# Patient Record
Sex: MALE | Employment: OTHER | URBAN - METROPOLITAN AREA
[De-identification: names, ages, dates, MRNs, and addresses within clinical notes are randomized per-mention and may not be internally consistent; named-entity substitution may affect disease eponyms.]

---

## 2017-07-18 ENCOUNTER — HOSPITAL ENCOUNTER (OUTPATIENT)
Dept: GENERAL RADIOLOGY | Age: 77
Discharge: HOME | End: 2017-07-18
Attending: INTERNAL MEDICINE | Admitting: INTERNAL MEDICINE

## 2017-07-18 ENCOUNTER — OFFICE VISIT (OUTPATIENT)
Age: 77
End: 2017-07-18

## 2017-07-18 VITALS
HEIGHT: 63 IN | WEIGHT: 129.6 LBS | DIASTOLIC BLOOD PRESSURE: 68 MMHG | SYSTOLIC BLOOD PRESSURE: 132 MMHG | BODY MASS INDEX: 22.96 KG/M2

## 2017-07-18 DIAGNOSIS — M81.0 OSTEOPOROSIS: ICD-10-CM

## 2017-07-18 DIAGNOSIS — Z51.81 MEDICATION MONITORING ENCOUNTER: ICD-10-CM

## 2017-07-18 DIAGNOSIS — M81.0 OSTEOPOROSIS: Primary | ICD-10-CM

## 2017-07-18 PROCEDURE — 1123F ACP DISCUSS/DSCN MKR DOCD: CPT | Performed by: INTERNAL MEDICINE

## 2017-07-18 PROCEDURE — 1036F TOBACCO NON-USER: CPT | Performed by: INTERNAL MEDICINE

## 2017-07-18 PROCEDURE — 3017F COLORECTAL CA SCREEN DOC REV: CPT | Performed by: INTERNAL MEDICINE

## 2017-07-18 PROCEDURE — 99214 OFFICE O/P EST MOD 30 MIN: CPT | Performed by: INTERNAL MEDICINE

## 2017-07-18 PROCEDURE — G8427 DOCREV CUR MEDS BY ELIG CLIN: HCPCS | Performed by: INTERNAL MEDICINE

## 2017-07-18 PROCEDURE — G8420 CALC BMI NORM PARAMETERS: HCPCS | Performed by: INTERNAL MEDICINE

## 2017-07-18 PROCEDURE — 77080 DXA BONE DENSITY AXIAL: CPT | Performed by: INTERNAL MEDICINE

## 2017-07-18 PROCEDURE — 4040F PNEUMOC VAC/ADMIN/RCVD: CPT | Performed by: INTERNAL MEDICINE

## 2017-07-18 PROCEDURE — 4005F PHARM THX FOR OP RXD: CPT | Performed by: INTERNAL MEDICINE

## 2017-07-21 ENCOUNTER — PROCEDURE VISIT (OUTPATIENT)
Age: 77
End: 2017-07-21

## 2017-07-21 DIAGNOSIS — M81.0 OSTEOPOROSIS: Primary | ICD-10-CM

## 2018-07-22 PROBLEM — M81.0 AGE-RELATED OSTEOPOROSIS WITHOUT CURRENT PATHOLOGICAL FRACTURE: Status: ACTIVE | Noted: 2018-07-22

## 2018-08-14 ENCOUNTER — HOSPITAL ENCOUNTER (OUTPATIENT)
Dept: GENERAL RADIOLOGY | Age: 78
Discharge: HOME | End: 2018-08-14
Payer: MEDICARE

## 2018-08-14 ENCOUNTER — PROCEDURE VISIT (OUTPATIENT)
Age: 78
End: 2018-08-14

## 2018-08-14 ENCOUNTER — OFFICE VISIT (OUTPATIENT)
Age: 78
End: 2018-08-14

## 2018-08-14 VITALS
SYSTOLIC BLOOD PRESSURE: 133 MMHG | DIASTOLIC BLOOD PRESSURE: 68 MMHG | WEIGHT: 124 LBS | BODY MASS INDEX: 21.97 KG/M2 | HEIGHT: 63 IN

## 2018-08-14 DIAGNOSIS — M81.0 AGE-RELATED OSTEOPOROSIS WITHOUT CURRENT PATHOLOGICAL FRACTURE: ICD-10-CM

## 2018-08-14 DIAGNOSIS — M81.0 AGE-RELATED OSTEOPOROSIS WITHOUT CURRENT PATHOLOGICAL FRACTURE: Primary | ICD-10-CM

## 2018-08-14 DIAGNOSIS — Z51.81 MEDICATION MONITORING ENCOUNTER: ICD-10-CM

## 2018-08-14 PROCEDURE — 99214 OFFICE O/P EST MOD 30 MIN: CPT | Performed by: INTERNAL MEDICINE

## 2018-08-14 PROCEDURE — G8427 DOCREV CUR MEDS BY ELIG CLIN: HCPCS | Performed by: INTERNAL MEDICINE

## 2018-08-14 PROCEDURE — G8420 CALC BMI NORM PARAMETERS: HCPCS | Performed by: INTERNAL MEDICINE

## 2018-08-14 PROCEDURE — 1036F TOBACCO NON-USER: CPT | Performed by: INTERNAL MEDICINE

## 2018-08-14 PROCEDURE — 1101F PT FALLS ASSESS-DOCD LE1/YR: CPT | Performed by: INTERNAL MEDICINE

## 2018-08-14 PROCEDURE — 77080 DXA BONE DENSITY AXIAL: CPT | Performed by: INTERNAL MEDICINE

## 2018-08-14 PROCEDURE — 4040F PNEUMOC VAC/ADMIN/RCVD: CPT | Performed by: INTERNAL MEDICINE

## 2018-08-14 PROCEDURE — 77080 DXA BONE DENSITY AXIAL: CPT

## 2018-08-14 PROCEDURE — 1123F ACP DISCUSS/DSCN MKR DOCD: CPT | Performed by: INTERNAL MEDICINE

## 2018-08-14 RX ORDER — ALENDRONATE SODIUM 70 MG/1
TABLET ORAL
Qty: 13 TABLET | Refills: 4 | Status: SHIPPED | OUTPATIENT
Start: 2018-08-14 | End: 2019-11-01 | Stop reason: ALTCHOICE

## 2018-08-14 RX ORDER — ALENDRONATE SODIUM 70 MG/1
TABLET ORAL
Qty: 12 TABLET | Refills: 4 | Status: SHIPPED | OUTPATIENT
Start: 2018-08-14 | End: 2018-08-14 | Stop reason: SDUPTHER

## 2018-08-14 NOTE — PROGRESS NOTES
PAST MEDICAL AND SURGICAL HISTORY, SOCIAL HISTORY, AND REVIEW OF SYSTEMS, SEE PATIENT QUESTIONNAIRE OF TODAYS DATE. PHYSICAL EXAMINATION. GENERAL. Well-nourished, well-developed, normally proportioned adult. MENTAL STATUS. Pleasant mood. Oriented to time, place, and person. MUSCULOSKELETAL. Spinal contours are normal.  No spine tenderness to palpation or percussion. Two finger spaces between ribs and pelvis. Gait steady without assistance. NEUROLOGICAL. Able to rise from chair without using arms. No apparent focal motor or sensory deficit. Coordination appears normal. Using a walker    BONE DENSITY. Most recent done here using [employer] equipment. T-scores  Initial study: 06/11/2002 L1-L4 -0.4 left total hip -1.2   Current study: 08/14/2018 L1-L -1.0 left fem. neck -2.3     The table below shows bone mineral density (grams/cm2), the appropriate measure for comparing serial scans An increase or decrease is significant based on precision studies done at our center according to the ISCD protocol. PA spine Proximal Femur (left)   Date L1-L3 Fem. neck Trochanter Total hip   06/11/2002 --- NA NA 0.852   04/26/2004 --- NA NA 0.877   01/16/2007 --- NA NA 0.878   02/12/2009 --- NA NA 0.854   02/03/2011 --- 0.666 0.575 0.817   04/08/2013 0.957 0.568 0.575 0.783   06/03/2014 0.959 0.572 0.557 0.745   06/09/2015 0.973 0.580 0.557 0.758   06/21/2016 0.978 0.608 0.560 0.770   07/18/2017 0.981 0.578 0.562 0.760   08/14/2018 0.959 0.617 0.580 0.770     IMPRESSION:  BONE DENSITY IS LOW, CONSISTENT WITH OSTEOPOROSIS. BETWEEN 2017 AND 2018, BMD DID NOT CHANGE SIGNIFICANTLY IN THE SPINE OR LEFT HIP; IT IS TRENDING UP IN THE FEMORAL NECK. Labs: 01/2013, total T 491. 11/2012 CMP CBC. 04/2013 UCa 263. 08/2014, total T 208, free T 39 ()   02/2016 Ca 9.4 Cr 1.0. X-rays viewed: DXA printouts reviewed. ASSESSMENT. Low bone density, most likely related to his history of low testosterone.   BMD is was

## 2019-01-07 ENCOUNTER — APPOINTMENT (OUTPATIENT)
Dept: GENERAL RADIOLOGY | Age: 79
DRG: 872 | End: 2019-01-07
Payer: MEDICARE

## 2019-01-07 ENCOUNTER — HOSPITAL ENCOUNTER (INPATIENT)
Age: 79
LOS: 1 days | Discharge: ANOTHER HOSPITAL | DRG: 872 | End: 2019-01-08
Attending: EMERGENCY MEDICINE | Admitting: INTERNAL MEDICINE
Payer: MEDICARE

## 2019-01-07 ENCOUNTER — APPOINTMENT (OUTPATIENT)
Dept: CT IMAGING | Age: 79
DRG: 872 | End: 2019-01-07
Payer: MEDICARE

## 2019-01-07 DIAGNOSIS — E07.9 THYROID DISEASE: ICD-10-CM

## 2019-01-07 DIAGNOSIS — R27.0 ATAXIA: ICD-10-CM

## 2019-01-07 DIAGNOSIS — R50.9 FEBRILE ILLNESS: Primary | ICD-10-CM

## 2019-01-07 DIAGNOSIS — R41.82 ALTERED MENTAL STATUS, UNSPECIFIED ALTERED MENTAL STATUS TYPE: ICD-10-CM

## 2019-01-07 DIAGNOSIS — G25.9 ABNORMAL LEG MOVEMENT: ICD-10-CM

## 2019-01-07 PROBLEM — J40 BRONCHITIS: Status: ACTIVE | Noted: 2019-01-07

## 2019-01-07 LAB
A/G RATIO: 1.3 (ref 1.1–2.2)
ALBUMIN SERPL-MCNC: 3.7 G/DL (ref 3.4–5)
ALP BLD-CCNC: 55 U/L (ref 40–129)
ALT SERPL-CCNC: 11 U/L (ref 10–40)
ANION GAP SERPL CALCULATED.3IONS-SCNC: 10 MMOL/L (ref 3–16)
AST SERPL-CCNC: 20 U/L (ref 15–37)
BACTERIA: ABNORMAL /HPF
BASOPHILS ABSOLUTE: 0.1 K/UL (ref 0–0.2)
BASOPHILS RELATIVE PERCENT: 1.4 %
BILIRUB SERPL-MCNC: 0.3 MG/DL (ref 0–1)
BILIRUBIN URINE: NEGATIVE
BLOOD, URINE: NEGATIVE
BUN BLDV-MCNC: 29 MG/DL (ref 7–20)
CALCIUM SERPL-MCNC: 8.7 MG/DL (ref 8.3–10.6)
CHLORIDE BLD-SCNC: 97 MMOL/L (ref 99–110)
CHP ED QC CHECK: YES
CLARITY: CLEAR
CO2: 27 MMOL/L (ref 21–32)
COLOR: YELLOW
CREAT SERPL-MCNC: 1 MG/DL (ref 0.8–1.3)
EKG ATRIAL RATE: 109 BPM
EKG DIAGNOSIS: NORMAL
EKG P AXIS: 54 DEGREES
EKG P-R INTERVAL: 136 MS
EKG Q-T INTERVAL: 324 MS
EKG QRS DURATION: 74 MS
EKG QTC CALCULATION (BAZETT): 436 MS
EKG R AXIS: -1 DEGREES
EKG T AXIS: 46 DEGREES
EKG VENTRICULAR RATE: 109 BPM
EOSINOPHILS ABSOLUTE: 0 K/UL (ref 0–0.6)
EOSINOPHILS RELATIVE PERCENT: 0.3 %
EPITHELIAL CELLS, UA: ABNORMAL /HPF
GFR AFRICAN AMERICAN: >60
GFR NON-AFRICAN AMERICAN: >60
GLOBULIN: 2.8 G/DL
GLUCOSE BLD-MCNC: 107 MG/DL
GLUCOSE BLD-MCNC: 121 MG/DL (ref 70–99)
GLUCOSE URINE: NEGATIVE MG/DL
HCT VFR BLD CALC: 34.8 % (ref 40.5–52.5)
HEMOGLOBIN: 11.5 G/DL (ref 13.5–17.5)
INR BLD: 1.15 (ref 0.86–1.14)
KETONES, URINE: ABNORMAL MG/DL
LACTIC ACID: 1.1 MMOL/L (ref 0.4–2)
LEUKOCYTE ESTERASE, URINE: NEGATIVE
LYMPHOCYTES ABSOLUTE: 0.3 K/UL (ref 1–5.1)
LYMPHOCYTES RELATIVE PERCENT: 3.1 %
MCH RBC QN AUTO: 31.6 PG (ref 26–34)
MCHC RBC AUTO-ENTMCNC: 33.1 G/DL (ref 31–36)
MCV RBC AUTO: 95.4 FL (ref 80–100)
MICROSCOPIC EXAMINATION: YES
MONOCYTES ABSOLUTE: 1.1 K/UL (ref 0–1.3)
MONOCYTES RELATIVE PERCENT: 12.4 %
NEUTROPHILS ABSOLUTE: 7.4 K/UL (ref 1.7–7.7)
NEUTROPHILS RELATIVE PERCENT: 82.8 %
NITRITE, URINE: NEGATIVE
PDW BLD-RTO: 14.5 % (ref 12.4–15.4)
PH UA: 6
PLATELET # BLD: 232 K/UL (ref 135–450)
PMV BLD AUTO: 9.1 FL (ref 5–10.5)
POTASSIUM SERPL-SCNC: 4.2 MMOL/L (ref 3.5–5.1)
PROTEIN UA: ABNORMAL MG/DL
PROTHROMBIN TIME: 13.1 SEC (ref 9.8–13)
RAPID INFLUENZA  B AGN: NEGATIVE
RAPID INFLUENZA A AGN: NEGATIVE
RBC # BLD: 3.65 M/UL (ref 4.2–5.9)
RBC UA: ABNORMAL /HPF (ref 0–2)
SODIUM BLD-SCNC: 134 MMOL/L (ref 136–145)
SPECIFIC GRAVITY UA: 1.02
TOTAL PROTEIN: 6.5 G/DL (ref 6.4–8.2)
TROPONIN: <0.01 NG/ML
URINE REFLEX TO CULTURE: ABNORMAL
URINE TYPE: ABNORMAL
UROBILINOGEN, URINE: 0.2 E.U./DL
WBC # BLD: 9 K/UL (ref 4–11)
WBC UA: ABNORMAL /HPF (ref 0–5)

## 2019-01-07 PROCEDURE — 70450 CT HEAD/BRAIN W/O DYE: CPT

## 2019-01-07 PROCEDURE — 85025 COMPLETE CBC W/AUTO DIFF WBC: CPT

## 2019-01-07 PROCEDURE — 94664 DEMO&/EVAL PT USE INHALER: CPT

## 2019-01-07 PROCEDURE — 96361 HYDRATE IV INFUSION ADD-ON: CPT

## 2019-01-07 PROCEDURE — 6360000002 HC RX W HCPCS: Performed by: EMERGENCY MEDICINE

## 2019-01-07 PROCEDURE — 87804 INFLUENZA ASSAY W/OPTIC: CPT

## 2019-01-07 PROCEDURE — 93005 ELECTROCARDIOGRAM TRACING: CPT | Performed by: EMERGENCY MEDICINE

## 2019-01-07 PROCEDURE — 84484 ASSAY OF TROPONIN QUANT: CPT

## 2019-01-07 PROCEDURE — 2580000003 HC RX 258: Performed by: NURSE PRACTITIONER

## 2019-01-07 PROCEDURE — 80053 COMPREHEN METABOLIC PANEL: CPT

## 2019-01-07 PROCEDURE — 70498 CT ANGIOGRAPHY NECK: CPT

## 2019-01-07 PROCEDURE — 94150 VITAL CAPACITY TEST: CPT

## 2019-01-07 PROCEDURE — 6360000004 HC RX CONTRAST MEDICATION: Performed by: EMERGENCY MEDICINE

## 2019-01-07 PROCEDURE — 87040 BLOOD CULTURE FOR BACTERIA: CPT

## 2019-01-07 PROCEDURE — 2580000003 HC RX 258: Performed by: EMERGENCY MEDICINE

## 2019-01-07 PROCEDURE — 85610 PROTHROMBIN TIME: CPT

## 2019-01-07 PROCEDURE — 70496 CT ANGIOGRAPHY HEAD: CPT

## 2019-01-07 PROCEDURE — 93010 ELECTROCARDIOGRAM REPORT: CPT | Performed by: INTERNAL MEDICINE

## 2019-01-07 PROCEDURE — 71045 X-RAY EXAM CHEST 1 VIEW: CPT

## 2019-01-07 PROCEDURE — 83605 ASSAY OF LACTIC ACID: CPT

## 2019-01-07 PROCEDURE — 1200000000 HC SEMI PRIVATE

## 2019-01-07 PROCEDURE — 99285 EMERGENCY DEPT VISIT HI MDM: CPT

## 2019-01-07 PROCEDURE — 96365 THER/PROPH/DIAG IV INF INIT: CPT

## 2019-01-07 PROCEDURE — 81001 URINALYSIS AUTO W/SCOPE: CPT

## 2019-01-07 RX ORDER — SODIUM CHLORIDE 0.9 % (FLUSH) 0.9 %
10 SYRINGE (ML) INJECTION PRN
Status: DISCONTINUED | OUTPATIENT
Start: 2019-01-07 | End: 2019-01-08 | Stop reason: HOSPADM

## 2019-01-07 RX ORDER — SODIUM CHLORIDE 0.9 % (FLUSH) 0.9 %
10 SYRINGE (ML) INJECTION EVERY 12 HOURS SCHEDULED
Status: DISCONTINUED | OUTPATIENT
Start: 2019-01-07 | End: 2019-01-08 | Stop reason: HOSPADM

## 2019-01-07 RX ORDER — ACETAMINOPHEN 325 MG/1
650 TABLET ORAL ONCE
Status: DISCONTINUED | OUTPATIENT
Start: 2019-01-07 | End: 2019-01-08 | Stop reason: HOSPADM

## 2019-01-07 RX ORDER — ATORVASTATIN CALCIUM 10 MG/1
10 TABLET, FILM COATED ORAL DAILY
Status: DISCONTINUED | OUTPATIENT
Start: 2019-01-08 | End: 2019-01-08 | Stop reason: HOSPADM

## 2019-01-07 RX ORDER — 0.9 % SODIUM CHLORIDE 0.9 %
1000 INTRAVENOUS SOLUTION INTRAVENOUS ONCE
Status: COMPLETED | OUTPATIENT
Start: 2019-01-07 | End: 2019-01-07

## 2019-01-07 RX ORDER — ONDANSETRON 2 MG/ML
4 INJECTION INTRAMUSCULAR; INTRAVENOUS EVERY 6 HOURS PRN
Status: DISCONTINUED | OUTPATIENT
Start: 2019-01-07 | End: 2019-01-08 | Stop reason: HOSPADM

## 2019-01-07 RX ORDER — LEVOFLOXACIN 5 MG/ML
500 INJECTION, SOLUTION INTRAVENOUS ONCE
Status: COMPLETED | OUTPATIENT
Start: 2019-01-07 | End: 2019-01-07

## 2019-01-07 RX ORDER — SODIUM CHLORIDE 9 MG/ML
1000 INJECTION, SOLUTION INTRAVENOUS CONTINUOUS
Status: DISCONTINUED | OUTPATIENT
Start: 2019-01-07 | End: 2019-01-08

## 2019-01-07 RX ORDER — PANTOPRAZOLE SODIUM 40 MG/1
40 TABLET, DELAYED RELEASE ORAL
Status: DISCONTINUED | OUTPATIENT
Start: 2019-01-08 | End: 2019-01-08 | Stop reason: HOSPADM

## 2019-01-07 RX ORDER — AZELASTINE 1 MG/ML
2 SPRAY, METERED NASAL 2 TIMES DAILY
Status: DISCONTINUED | OUTPATIENT
Start: 2019-01-07 | End: 2019-01-08 | Stop reason: HOSPADM

## 2019-01-07 RX ORDER — FINASTERIDE 5 MG/1
5 TABLET, FILM COATED ORAL DAILY
Status: DISCONTINUED | OUTPATIENT
Start: 2019-01-08 | End: 2019-01-08 | Stop reason: HOSPADM

## 2019-01-07 RX ORDER — ALBUTEROL SULFATE 2.5 MG/3ML
2.5 SOLUTION RESPIRATORY (INHALATION)
Status: DISCONTINUED | OUTPATIENT
Start: 2019-01-07 | End: 2019-01-08 | Stop reason: HOSPADM

## 2019-01-07 RX ORDER — LEVOFLOXACIN 5 MG/ML
750 INJECTION, SOLUTION INTRAVENOUS EVERY 24 HOURS
Status: DISCONTINUED | OUTPATIENT
Start: 2019-01-08 | End: 2019-01-08

## 2019-01-07 RX ORDER — AZELASTINE HCL 205.5 UG/1
2 SPRAY NASAL 2 TIMES DAILY
Status: DISCONTINUED | OUTPATIENT
Start: 2019-01-07 | End: 2019-01-08

## 2019-01-07 RX ORDER — TAMSULOSIN HYDROCHLORIDE 0.4 MG/1
0.4 CAPSULE ORAL DAILY
Status: DISCONTINUED | OUTPATIENT
Start: 2019-01-08 | End: 2019-01-08 | Stop reason: HOSPADM

## 2019-01-07 RX ORDER — IPRATROPIUM BROMIDE AND ALBUTEROL SULFATE 2.5; .5 MG/3ML; MG/3ML
1 SOLUTION RESPIRATORY (INHALATION)
Status: DISCONTINUED | OUTPATIENT
Start: 2019-01-08 | End: 2019-01-08

## 2019-01-07 RX ORDER — LEVOTHYROXINE SODIUM 0.1 MG/1
100 TABLET ORAL DAILY
Status: DISCONTINUED | OUTPATIENT
Start: 2019-01-08 | End: 2019-01-08 | Stop reason: HOSPADM

## 2019-01-07 RX ADMIN — IOPAMIDOL 75 ML: 755 INJECTION, SOLUTION INTRAVENOUS at 16:33

## 2019-01-07 RX ADMIN — SODIUM CHLORIDE 1000 ML: 9 INJECTION, SOLUTION INTRAVENOUS at 19:37

## 2019-01-07 RX ADMIN — LEVOFLOXACIN 500 MG: 5 INJECTION, SOLUTION INTRAVENOUS at 18:31

## 2019-01-07 RX ADMIN — SODIUM CHLORIDE, PRESERVATIVE FREE 10 ML: 5 INJECTION INTRAVENOUS at 23:33

## 2019-01-07 RX ADMIN — SODIUM CHLORIDE 1000 ML: 9 INJECTION, SOLUTION INTRAVENOUS at 17:21

## 2019-01-07 ASSESSMENT — PAIN SCALES - GENERAL: PAINLEVEL_OUTOF10: 0

## 2019-01-08 ENCOUNTER — APPOINTMENT (OUTPATIENT)
Dept: GENERAL RADIOLOGY | Age: 79
DRG: 872 | End: 2019-01-08
Payer: MEDICARE

## 2019-01-08 VITALS
HEART RATE: 99 BPM | OXYGEN SATURATION: 94 % | HEIGHT: 65 IN | RESPIRATION RATE: 16 BRPM | TEMPERATURE: 98.6 F | SYSTOLIC BLOOD PRESSURE: 132 MMHG | BODY MASS INDEX: 22.17 KG/M2 | DIASTOLIC BLOOD PRESSURE: 79 MMHG | WEIGHT: 133.06 LBS

## 2019-01-08 PROBLEM — R50.9 FEBRILE ILLNESS: Status: ACTIVE | Noted: 2019-01-08

## 2019-01-08 PROBLEM — J18.9 PNEUMONIA: Status: ACTIVE | Noted: 2019-01-07

## 2019-01-08 LAB
ANION GAP SERPL CALCULATED.3IONS-SCNC: 9 MMOL/L (ref 3–16)
BUN BLDV-MCNC: 17 MG/DL (ref 7–20)
CALCIUM SERPL-MCNC: 7.7 MG/DL (ref 8.3–10.6)
CHLORIDE BLD-SCNC: 99 MMOL/L (ref 99–110)
CO2: 25 MMOL/L (ref 21–32)
CREAT SERPL-MCNC: 0.7 MG/DL (ref 0.8–1.3)
GFR AFRICAN AMERICAN: >60
GFR NON-AFRICAN AMERICAN: >60
GLUCOSE BLD-MCNC: 107 MG/DL (ref 70–99)
GLUCOSE BLD-MCNC: 94 MG/DL (ref 70–99)
HCT VFR BLD CALC: 34.8 % (ref 40.5–52.5)
HEMOGLOBIN: 11.6 G/DL (ref 13.5–17.5)
MCH RBC QN AUTO: 31.7 PG (ref 26–34)
MCHC RBC AUTO-ENTMCNC: 33.2 G/DL (ref 31–36)
MCV RBC AUTO: 95.5 FL (ref 80–100)
PDW BLD-RTO: 14.2 % (ref 12.4–15.4)
PERFORMED ON: ABNORMAL
PLATELET # BLD: 206 K/UL (ref 135–450)
PMV BLD AUTO: 8.9 FL (ref 5–10.5)
POTASSIUM REFLEX MAGNESIUM: 3.8 MMOL/L (ref 3.5–5.1)
PROCALCITONIN: 0.22 NG/ML (ref 0–0.15)
RBC # BLD: 3.65 M/UL (ref 4.2–5.9)
SODIUM BLD-SCNC: 133 MMOL/L (ref 136–145)
WBC # BLD: 5.6 K/UL (ref 4–11)

## 2019-01-08 PROCEDURE — 92611 MOTION FLUOROSCOPY/SWALLOW: CPT

## 2019-01-08 PROCEDURE — G8996 SWALLOW CURRENT STATUS: HCPCS

## 2019-01-08 PROCEDURE — 2580000003 HC RX 258: Performed by: INTERNAL MEDICINE

## 2019-01-08 PROCEDURE — 6360000002 HC RX W HCPCS: Performed by: INTERNAL MEDICINE

## 2019-01-08 PROCEDURE — 6370000000 HC RX 637 (ALT 250 FOR IP): Performed by: INTERNAL MEDICINE

## 2019-01-08 PROCEDURE — 97162 PT EVAL MOD COMPLEX 30 MIN: CPT

## 2019-01-08 PROCEDURE — 97530 THERAPEUTIC ACTIVITIES: CPT

## 2019-01-08 PROCEDURE — 97166 OT EVAL MOD COMPLEX 45 MIN: CPT

## 2019-01-08 PROCEDURE — 6370000000 HC RX 637 (ALT 250 FOR IP): Performed by: NURSE PRACTITIONER

## 2019-01-08 PROCEDURE — 6360000002 HC RX W HCPCS: Performed by: NURSE PRACTITIONER

## 2019-01-08 PROCEDURE — 94640 AIRWAY INHALATION TREATMENT: CPT

## 2019-01-08 PROCEDURE — 2580000003 HC RX 258: Performed by: NURSE PRACTITIONER

## 2019-01-08 PROCEDURE — 84145 PROCALCITONIN (PCT): CPT

## 2019-01-08 PROCEDURE — G8978 MOBILITY CURRENT STATUS: HCPCS

## 2019-01-08 PROCEDURE — 97116 GAIT TRAINING THERAPY: CPT

## 2019-01-08 PROCEDURE — 85027 COMPLETE CBC AUTOMATED: CPT

## 2019-01-08 PROCEDURE — G8997 SWALLOW GOAL STATUS: HCPCS

## 2019-01-08 PROCEDURE — 2580000003 HC RX 258: Performed by: EMERGENCY MEDICINE

## 2019-01-08 PROCEDURE — 92526 ORAL FUNCTION THERAPY: CPT

## 2019-01-08 PROCEDURE — G8979 MOBILITY GOAL STATUS: HCPCS

## 2019-01-08 PROCEDURE — 92610 EVALUATE SWALLOWING FUNCTION: CPT

## 2019-01-08 PROCEDURE — 80048 BASIC METABOLIC PNL TOTAL CA: CPT

## 2019-01-08 PROCEDURE — 74230 X-RAY XM SWLNG FUNCJ C+: CPT

## 2019-01-08 PROCEDURE — 36415 COLL VENOUS BLD VENIPUNCTURE: CPT

## 2019-01-08 PROCEDURE — 94664 DEMO&/EVAL PT USE INHALER: CPT

## 2019-01-08 RX ORDER — ACETAMINOPHEN 650 MG/1
650 SUPPOSITORY RECTAL EVERY 4 HOURS PRN
Status: DISCONTINUED | OUTPATIENT
Start: 2019-01-08 | End: 2019-01-08 | Stop reason: HOSPADM

## 2019-01-08 RX ORDER — IPRATROPIUM BROMIDE AND ALBUTEROL SULFATE 2.5; .5 MG/3ML; MG/3ML
1 SOLUTION RESPIRATORY (INHALATION) 2 TIMES DAILY
Status: DISCONTINUED | OUTPATIENT
Start: 2019-01-08 | End: 2019-01-08 | Stop reason: HOSPADM

## 2019-01-08 RX ORDER — SODIUM CHLORIDE 9 MG/ML
1000 INJECTION, SOLUTION INTRAVENOUS CONTINUOUS
Status: DISCONTINUED | OUTPATIENT
Start: 2019-01-08 | End: 2019-01-08 | Stop reason: HOSPADM

## 2019-01-08 RX ORDER — GUAIFENESIN 600 MG/1
600 TABLET, EXTENDED RELEASE ORAL 2 TIMES DAILY
Status: DISCONTINUED | OUTPATIENT
Start: 2019-01-08 | End: 2019-01-08 | Stop reason: HOSPADM

## 2019-01-08 RX ORDER — METHYLPREDNISOLONE SODIUM SUCCINATE 40 MG/ML
40 INJECTION, POWDER, LYOPHILIZED, FOR SOLUTION INTRAMUSCULAR; INTRAVENOUS DAILY
Status: DISCONTINUED | OUTPATIENT
Start: 2019-01-08 | End: 2019-01-08

## 2019-01-08 RX ORDER — PREDNISONE 20 MG/1
20 TABLET ORAL 2 TIMES DAILY
Status: DISCONTINUED | OUTPATIENT
Start: 2019-01-08 | End: 2019-01-08 | Stop reason: HOSPADM

## 2019-01-08 RX ORDER — METHYLPREDNISOLONE SODIUM SUCCINATE 40 MG/ML
80 INJECTION, POWDER, LYOPHILIZED, FOR SOLUTION INTRAMUSCULAR; INTRAVENOUS DAILY
Status: DISCONTINUED | OUTPATIENT
Start: 2019-01-08 | End: 2019-01-08 | Stop reason: HOSPADM

## 2019-01-08 RX ADMIN — ACETAMINOPHEN 650 MG: 650 SUPPOSITORY RECTAL at 09:35

## 2019-01-08 RX ADMIN — IPRATROPIUM BROMIDE AND ALBUTEROL SULFATE 1 AMPULE: .5; 3 SOLUTION RESPIRATORY (INHALATION) at 08:15

## 2019-01-08 RX ADMIN — SODIUM CHLORIDE 1000 ML: 9 INJECTION, SOLUTION INTRAVENOUS at 07:42

## 2019-01-08 RX ADMIN — PIPERACILLIN SODIUM,TAZOBACTAM SODIUM 3.38 G: 3; .375 INJECTION, POWDER, FOR SOLUTION INTRAVENOUS at 17:03

## 2019-01-08 RX ADMIN — ENOXAPARIN SODIUM 40 MG: 40 INJECTION SUBCUTANEOUS at 07:41

## 2019-01-08 RX ADMIN — SODIUM CHLORIDE 1000 ML: 9 INJECTION, SOLUTION INTRAVENOUS at 11:16

## 2019-01-08 RX ADMIN — AZELASTINE HYDROCHLORIDE 2 SPRAY: 137 SPRAY, METERED NASAL at 00:23

## 2019-01-08 RX ADMIN — SODIUM CHLORIDE, PRESERVATIVE FREE 10 ML: 5 INJECTION INTRAVENOUS at 07:42

## 2019-01-08 RX ADMIN — METHYLPREDNISOLONE SODIUM SUCCINATE 80 MG: 40 INJECTION, POWDER, FOR SOLUTION INTRAMUSCULAR; INTRAVENOUS at 07:42

## 2019-01-08 RX ADMIN — AZELASTINE HYDROCHLORIDE 2 SPRAY: 137 SPRAY, METERED NASAL at 09:46

## 2019-01-08 ASSESSMENT — PAIN SCALES - GENERAL
PAINLEVEL_OUTOF10: 0

## 2019-01-12 LAB
BLOOD CULTURE, ROUTINE: NORMAL
CULTURE, BLOOD 2: NORMAL

## 2019-08-13 ENCOUNTER — TELEPHONE (OUTPATIENT)
Dept: ENDOCRINOLOGY | Age: 79
End: 2019-08-13

## 2019-08-14 RX ORDER — ZOLEDRONIC ACID 5 MG/100ML
5 INJECTION, SOLUTION INTRAVENOUS ONCE
Qty: 100 ML | Refills: 0 | Status: ON HOLD | OUTPATIENT
Start: 2019-08-14 | End: 2020-01-30 | Stop reason: HOSPADM

## 2019-08-15 DIAGNOSIS — M81.0 SENILE OSTEOPOROSIS: ICD-10-CM

## 2019-08-15 RX ORDER — ZOLEDRONIC ACID 5 MG/100ML
5 INJECTION, SOLUTION INTRAVENOUS ONCE
Status: CANCELLED | OUTPATIENT
Start: 2019-08-15

## 2019-10-16 ENCOUNTER — HOSPITAL ENCOUNTER (OUTPATIENT)
Dept: SPEECH THERAPY | Age: 79
Setting detail: THERAPIES SERIES
Discharge: HOME | End: 2019-10-16
Payer: MEDICARE

## 2019-10-16 ENCOUNTER — HOSPITAL ENCOUNTER (OUTPATIENT)
Dept: PHYSICAL THERAPY | Age: 79
Setting detail: THERAPIES SERIES
Discharge: HOME | End: 2019-10-16
Payer: MEDICARE

## 2019-10-16 ENCOUNTER — HOSPITAL ENCOUNTER (OUTPATIENT)
Dept: OCCUPATIONAL THERAPY | Age: 79
Setting detail: THERAPIES SERIES
Discharge: HOME | End: 2019-10-16
Payer: MEDICARE

## 2019-10-16 DIAGNOSIS — R13.12 OROPHARYNGEAL DYSPHAGIA: Primary | ICD-10-CM

## 2019-10-16 PROCEDURE — 97530 THERAPEUTIC ACTIVITIES: CPT

## 2019-10-16 PROCEDURE — 97166 OT EVAL MOD COMPLEX 45 MIN: CPT

## 2019-10-16 PROCEDURE — 97110 THERAPEUTIC EXERCISES: CPT

## 2019-10-16 PROCEDURE — 92526 ORAL FUNCTION THERAPY: CPT

## 2019-10-16 PROCEDURE — 92610 EVALUATE SWALLOWING FUNCTION: CPT

## 2019-10-16 PROCEDURE — 97162 PT EVAL MOD COMPLEX 30 MIN: CPT

## 2019-10-16 ASSESSMENT — 9 HOLE PEG TEST
TESTTIME_SECONDS: 66
TESTTIME_SECONDS: 66

## 2019-10-18 ENCOUNTER — HOSPITAL ENCOUNTER (OUTPATIENT)
Dept: SPEECH THERAPY | Age: 79
Setting detail: THERAPIES SERIES
Discharge: HOME | End: 2019-10-18
Payer: MEDICARE

## 2019-10-18 PROCEDURE — 92526 ORAL FUNCTION THERAPY: CPT

## 2019-10-21 ENCOUNTER — TELEPHONE (OUTPATIENT)
Dept: ENDOCRINOLOGY | Age: 79
End: 2019-10-21

## 2019-10-23 ENCOUNTER — HOSPITAL ENCOUNTER (OUTPATIENT)
Dept: PHYSICAL THERAPY | Age: 79
Setting detail: THERAPIES SERIES
End: 2019-10-23
Payer: MEDICARE

## 2019-10-25 ENCOUNTER — HOSPITAL ENCOUNTER (OUTPATIENT)
Dept: OCCUPATIONAL THERAPY | Age: 79
Setting detail: THERAPIES SERIES
Discharge: HOME | End: 2019-10-25
Payer: MEDICARE

## 2019-10-25 PROCEDURE — 97110 THERAPEUTIC EXERCISES: CPT

## 2019-10-25 PROCEDURE — 97112 NEUROMUSCULAR REEDUCATION: CPT

## 2019-10-25 PROCEDURE — 97530 THERAPEUTIC ACTIVITIES: CPT

## 2019-10-28 ENCOUNTER — APPOINTMENT (OUTPATIENT)
Dept: SPEECH THERAPY | Age: 79
End: 2019-10-28
Payer: MEDICARE

## 2019-10-28 ENCOUNTER — HOSPITAL ENCOUNTER (OUTPATIENT)
Dept: OCCUPATIONAL THERAPY | Age: 79
Setting detail: THERAPIES SERIES
Discharge: HOME | End: 2019-10-28
Payer: MEDICARE

## 2019-10-28 PROCEDURE — 97530 THERAPEUTIC ACTIVITIES: CPT

## 2019-10-28 PROCEDURE — 97110 THERAPEUTIC EXERCISES: CPT

## 2019-10-29 ENCOUNTER — HOSPITAL ENCOUNTER (OUTPATIENT)
Dept: PHYSICAL THERAPY | Age: 79
Setting detail: THERAPIES SERIES
Discharge: HOME | End: 2019-10-29
Payer: MEDICARE

## 2019-10-29 PROCEDURE — 97116 GAIT TRAINING THERAPY: CPT

## 2019-10-30 ENCOUNTER — HOSPITAL ENCOUNTER (OUTPATIENT)
Dept: SPEECH THERAPY | Age: 79
Setting detail: THERAPIES SERIES
Discharge: HOME | End: 2019-10-30
Payer: MEDICARE

## 2019-10-30 ENCOUNTER — TELEPHONE (OUTPATIENT)
Dept: ENDOCRINOLOGY | Age: 79
End: 2019-10-30

## 2019-10-30 PROCEDURE — 92526 ORAL FUNCTION THERAPY: CPT

## 2019-10-31 ENCOUNTER — APPOINTMENT (OUTPATIENT)
Dept: PHYSICAL THERAPY | Age: 79
End: 2019-10-31
Payer: MEDICARE

## 2019-11-01 ENCOUNTER — HOSPITAL ENCOUNTER (OUTPATIENT)
Dept: NURSING | Age: 79
Setting detail: INFUSION SERIES
Discharge: HOME | End: 2019-11-01
Payer: MEDICARE

## 2019-11-01 VITALS
HEART RATE: 83 BPM | HEIGHT: 65 IN | BODY MASS INDEX: 21.99 KG/M2 | TEMPERATURE: 97.6 F | RESPIRATION RATE: 16 BRPM | DIASTOLIC BLOOD PRESSURE: 58 MMHG | SYSTOLIC BLOOD PRESSURE: 88 MMHG | WEIGHT: 132 LBS

## 2019-11-01 DIAGNOSIS — M81.0 SENILE OSTEOPOROSIS: Primary | ICD-10-CM

## 2019-11-01 PROCEDURE — 96365 THER/PROPH/DIAG IV INF INIT: CPT

## 2019-11-01 PROCEDURE — 99201 HC NEW PT, OUTPT VISIT LEVEL 1: CPT

## 2019-11-01 PROCEDURE — 6360000002 HC RX W HCPCS: Performed by: INTERNAL MEDICINE

## 2019-11-01 RX ORDER — ZOLEDRONIC ACID 5 MG/100ML
5 INJECTION, SOLUTION INTRAVENOUS ONCE
Status: CANCELLED | OUTPATIENT
Start: 2019-11-01

## 2019-11-01 RX ORDER — ZOLEDRONIC ACID 5 MG/100ML
5 INJECTION, SOLUTION INTRAVENOUS ONCE
Status: COMPLETED | OUTPATIENT
Start: 2019-11-01 | End: 2019-11-01

## 2019-11-01 RX ADMIN — ZOLEDRONIC ACID 5 MG: 0.05 INJECTION, SOLUTION INTRAVENOUS at 12:15

## 2019-11-01 NOTE — PROGRESS NOTES
Pt tolerates infusion well discharge instructions reviewed with wife and verbalizes understanding discharged via wheelchair in stable condition

## 2019-11-04 ENCOUNTER — HOSPITAL ENCOUNTER (OUTPATIENT)
Dept: SPEECH THERAPY | Age: 79
Setting detail: THERAPIES SERIES
Discharge: HOME | End: 2019-11-04
Payer: MEDICARE

## 2019-11-04 ENCOUNTER — HOSPITAL ENCOUNTER (OUTPATIENT)
Dept: OCCUPATIONAL THERAPY | Age: 79
Setting detail: THERAPIES SERIES
Discharge: HOME | End: 2019-11-04
Payer: MEDICARE

## 2019-11-04 ENCOUNTER — HOSPITAL ENCOUNTER (OUTPATIENT)
Dept: PHYSICAL THERAPY | Age: 79
Setting detail: THERAPIES SERIES
Discharge: HOME | End: 2019-11-04
Payer: MEDICARE

## 2019-11-04 PROCEDURE — 97110 THERAPEUTIC EXERCISES: CPT

## 2019-11-04 PROCEDURE — 97530 THERAPEUTIC ACTIVITIES: CPT

## 2019-11-04 PROCEDURE — 92526 ORAL FUNCTION THERAPY: CPT

## 2019-11-06 ENCOUNTER — HOSPITAL ENCOUNTER (OUTPATIENT)
Dept: SPEECH THERAPY | Age: 79
Setting detail: THERAPIES SERIES
Discharge: HOME | End: 2019-11-06
Payer: MEDICARE

## 2019-11-06 ENCOUNTER — HOSPITAL ENCOUNTER (OUTPATIENT)
Dept: OCCUPATIONAL THERAPY | Age: 79
Setting detail: THERAPIES SERIES
Discharge: HOME | End: 2019-11-06
Payer: MEDICARE

## 2019-11-06 ENCOUNTER — HOSPITAL ENCOUNTER (OUTPATIENT)
Dept: PHYSICAL THERAPY | Age: 79
Setting detail: THERAPIES SERIES
Discharge: HOME | End: 2019-11-06
Payer: MEDICARE

## 2019-11-06 PROCEDURE — 97530 THERAPEUTIC ACTIVITIES: CPT

## 2019-11-06 PROCEDURE — 97110 THERAPEUTIC EXERCISES: CPT

## 2019-11-06 PROCEDURE — 97116 GAIT TRAINING THERAPY: CPT

## 2019-11-06 PROCEDURE — 92526 ORAL FUNCTION THERAPY: CPT

## 2019-11-11 ENCOUNTER — HOSPITAL ENCOUNTER (OUTPATIENT)
Dept: OCCUPATIONAL THERAPY | Age: 79
Setting detail: THERAPIES SERIES
Discharge: HOME | End: 2019-11-11
Payer: MEDICARE

## 2019-11-11 ENCOUNTER — APPOINTMENT (OUTPATIENT)
Dept: SPEECH THERAPY | Age: 79
End: 2019-11-11
Payer: MEDICARE

## 2019-11-11 ENCOUNTER — HOSPITAL ENCOUNTER (OUTPATIENT)
Dept: PHYSICAL THERAPY | Age: 79
Setting detail: THERAPIES SERIES
Discharge: HOME | End: 2019-11-11
Payer: MEDICARE

## 2019-11-11 PROCEDURE — 97530 THERAPEUTIC ACTIVITIES: CPT

## 2019-11-11 PROCEDURE — 97110 THERAPEUTIC EXERCISES: CPT

## 2019-11-11 PROCEDURE — 97116 GAIT TRAINING THERAPY: CPT

## 2019-11-12 ENCOUNTER — APPOINTMENT (OUTPATIENT)
Dept: SPEECH THERAPY | Age: 79
End: 2019-11-12
Payer: MEDICARE

## 2019-11-13 ENCOUNTER — APPOINTMENT (OUTPATIENT)
Dept: OCCUPATIONAL THERAPY | Age: 79
End: 2019-11-13
Payer: MEDICARE

## 2019-11-13 ENCOUNTER — HOSPITAL ENCOUNTER (OUTPATIENT)
Dept: SPEECH THERAPY | Age: 79
Setting detail: THERAPIES SERIES
Discharge: HOME | End: 2019-11-13
Payer: MEDICARE

## 2019-11-13 ENCOUNTER — HOSPITAL ENCOUNTER (OUTPATIENT)
Dept: PHYSICAL THERAPY | Age: 79
Setting detail: THERAPIES SERIES
Discharge: HOME | End: 2019-11-13
Payer: MEDICARE

## 2019-11-18 ENCOUNTER — APPOINTMENT (OUTPATIENT)
Dept: SPEECH THERAPY | Age: 79
End: 2019-11-18
Payer: MEDICARE

## 2019-11-18 ENCOUNTER — APPOINTMENT (OUTPATIENT)
Dept: OCCUPATIONAL THERAPY | Age: 79
End: 2019-11-18
Payer: MEDICARE

## 2019-11-18 ENCOUNTER — APPOINTMENT (OUTPATIENT)
Dept: PHYSICAL THERAPY | Age: 79
End: 2019-11-18
Payer: MEDICARE

## 2019-11-20 ENCOUNTER — APPOINTMENT (OUTPATIENT)
Dept: PHYSICAL THERAPY | Age: 79
End: 2019-11-20
Payer: MEDICARE

## 2019-11-20 ENCOUNTER — APPOINTMENT (OUTPATIENT)
Dept: SPEECH THERAPY | Age: 79
End: 2019-11-20
Payer: MEDICARE

## 2019-11-20 ENCOUNTER — APPOINTMENT (OUTPATIENT)
Dept: OCCUPATIONAL THERAPY | Age: 79
End: 2019-11-20
Payer: MEDICARE

## 2020-01-01 ENCOUNTER — HOSPITAL ENCOUNTER (OUTPATIENT)
Dept: RADIATION ONCOLOGY | Age: 80
Discharge: HOME | End: 2020-09-18
Payer: MEDICARE

## 2020-01-01 ENCOUNTER — HOSPITAL ENCOUNTER (OUTPATIENT)
Dept: MRI IMAGING | Age: 80
Discharge: HOME | End: 2020-09-14
Payer: MEDICARE

## 2020-01-01 ENCOUNTER — HOSPITAL ENCOUNTER (OUTPATIENT)
Dept: RADIATION ONCOLOGY | Age: 80
Discharge: HOME | End: 2020-09-17
Payer: MEDICARE

## 2020-01-01 ENCOUNTER — HOSPITAL ENCOUNTER (OUTPATIENT)
Dept: MRI IMAGING | Age: 80
Discharge: HOME | End: 2020-11-11
Payer: MEDICARE

## 2020-01-01 ENCOUNTER — APPOINTMENT (OUTPATIENT)
Dept: GENERAL RADIOLOGY | Age: 80
End: 2020-01-01
Payer: MEDICARE

## 2020-01-01 ENCOUNTER — HOSPITAL ENCOUNTER (EMERGENCY)
Age: 80
Discharge: ANOTHER HOSPITAL | End: 2020-05-28
Attending: EMERGENCY MEDICINE
Payer: MEDICARE

## 2020-01-01 ENCOUNTER — HOSPITAL ENCOUNTER (OUTPATIENT)
Dept: RADIATION ONCOLOGY | Age: 80
Discharge: HOME | End: 2020-09-14
Payer: MEDICARE

## 2020-01-01 ENCOUNTER — HOSPITAL ENCOUNTER (OUTPATIENT)
Dept: RADIATION ONCOLOGY | Age: 80
Discharge: HOME | End: 2020-09-15
Payer: MEDICARE

## 2020-01-01 ENCOUNTER — APPOINTMENT (OUTPATIENT)
Dept: CT IMAGING | Age: 80
End: 2020-01-01
Payer: MEDICARE

## 2020-01-01 ENCOUNTER — HOSPITAL ENCOUNTER (OUTPATIENT)
Dept: RADIATION ONCOLOGY | Age: 80
Discharge: HOME | End: 2020-09-16
Payer: MEDICARE

## 2020-01-01 ENCOUNTER — HOSPITAL ENCOUNTER (OUTPATIENT)
Dept: RADIATION ONCOLOGY | Age: 80
Discharge: HOME | End: 2020-09-21
Payer: MEDICARE

## 2020-01-01 ENCOUNTER — PRE-PROCEDURE TELEPHONE (OUTPATIENT)
Dept: RADIATION ONCOLOGY | Age: 80
End: 2020-01-01

## 2020-01-01 VITALS
TEMPERATURE: 98.9 F | HEIGHT: 65 IN | HEART RATE: 102 BPM | OXYGEN SATURATION: 96 % | DIASTOLIC BLOOD PRESSURE: 55 MMHG | WEIGHT: 125 LBS | RESPIRATION RATE: 35 BRPM | BODY MASS INDEX: 20.83 KG/M2 | SYSTOLIC BLOOD PRESSURE: 93 MMHG

## 2020-01-01 LAB
A/G RATIO: 1.1 (ref 1.1–2.2)
ALBUMIN SERPL-MCNC: 3.7 G/DL (ref 3.4–5)
ALP BLD-CCNC: 96 U/L (ref 40–129)
ALT SERPL-CCNC: 16 U/L (ref 10–40)
ANION GAP SERPL CALCULATED.3IONS-SCNC: 11 MMOL/L (ref 3–16)
APTT: 25.6 SEC (ref 24.2–36.2)
AST SERPL-CCNC: 20 U/L (ref 15–37)
BACTERIA: ABNORMAL /HPF
BANDED NEUTROPHILS RELATIVE PERCENT: 43 % (ref 0–7)
BASOPHILS ABSOLUTE: 0 K/UL (ref 0–0.2)
BASOPHILS RELATIVE PERCENT: 0 %
BILIRUB SERPL-MCNC: 0.4 MG/DL (ref 0–1)
BILIRUBIN URINE: NEGATIVE
BLOOD CULTURE, ROUTINE: NORMAL
BLOOD, URINE: NEGATIVE
BUN BLDV-MCNC: 29 MG/DL (ref 7–20)
CALCIUM SERPL-MCNC: 9.3 MG/DL (ref 8.3–10.6)
CHLORIDE BLD-SCNC: 91 MMOL/L (ref 99–110)
CLARITY: CLEAR
CO2: 29 MMOL/L (ref 21–32)
COLOR: YELLOW
CREAT SERPL-MCNC: 0.9 MG/DL (ref 0.8–1.3)
CULTURE, BLOOD 2: NORMAL
EKG ATRIAL RATE: 122 BPM
EKG DIAGNOSIS: NORMAL
EKG P AXIS: 109 DEGREES
EKG P-R INTERVAL: 152 MS
EKG Q-T INTERVAL: 330 MS
EKG QRS DURATION: 70 MS
EKG QTC CALCULATION (BAZETT): 470 MS
EKG R AXIS: 16 DEGREES
EKG T AXIS: 68 DEGREES
EKG VENTRICULAR RATE: 122 BPM
EOSINOPHILS ABSOLUTE: 0.2 K/UL (ref 0–0.6)
EOSINOPHILS RELATIVE PERCENT: 1 %
EPITHELIAL CELLS, UA: ABNORMAL /HPF (ref 0–5)
FERRITIN: 522.9 NG/ML (ref 30–400)
FIBRINOGEN: 642 MG/DL (ref 200–397)
GFR AFRICAN AMERICAN: >60
GFR NON-AFRICAN AMERICAN: >60
GLOBULIN: 3.5 G/DL
GLUCOSE BLD-MCNC: 110 MG/DL (ref 70–99)
GLUCOSE URINE: NEGATIVE MG/DL
HCT VFR BLD CALC: 38.4 % (ref 40.5–52.5)
HEMOGLOBIN: 12.5 G/DL (ref 13.5–17.5)
HYALINE CASTS: ABNORMAL /LPF (ref 0–2)
INR BLD: 0.98 (ref 0.86–1.14)
KETONES, URINE: NEGATIVE MG/DL
LACTATE DEHYDROGENASE: 186 U/L (ref 100–190)
LACTIC ACID, SEPSIS: 1.4 MMOL/L (ref 0.4–1.9)
LACTIC ACID, SEPSIS: 2.5 MMOL/L (ref 0.4–1.9)
LEUKOCYTE ESTERASE, URINE: NEGATIVE
LYMPHOCYTES ABSOLUTE: 0.2 K/UL (ref 1–5.1)
LYMPHOCYTES RELATIVE PERCENT: 1 %
MCH RBC QN AUTO: 30.8 PG (ref 26–34)
MCHC RBC AUTO-ENTMCNC: 32.5 G/DL (ref 31–36)
MCV RBC AUTO: 94.7 FL (ref 80–100)
METAMYELOCYTES RELATIVE PERCENT: 1 %
MICROSCOPIC EXAMINATION: YES
MONOCYTES ABSOLUTE: 0.7 K/UL (ref 0–1.3)
MONOCYTES RELATIVE PERCENT: 3 %
MUCUS: ABNORMAL /LPF
MYELOCYTE PERCENT: 2 %
NEUTROPHILS ABSOLUTE: 20.8 K/UL (ref 1.7–7.7)
NEUTROPHILS RELATIVE PERCENT: 49 %
NITRITE, URINE: NEGATIVE
PDW BLD-RTO: 14.7 % (ref 12.4–15.4)
PH UA: 5.5 (ref 5–8)
PLATELET # BLD: 379 K/UL (ref 135–450)
PMV BLD AUTO: 10.3 FL (ref 5–10.5)
POTASSIUM REFLEX MAGNESIUM: 3.7 MMOL/L (ref 3.5–5.1)
PROCALCITONIN: 0.11 NG/ML (ref 0–0.15)
PROTEIN UA: 30 MG/DL
PROTHROMBIN TIME: 11.4 SEC (ref 10–13.2)
RBC # BLD: 4.05 M/UL (ref 4.2–5.9)
RBC # BLD: NORMAL 10*6/UL
RBC UA: ABNORMAL /HPF (ref 0–4)
SODIUM BLD-SCNC: 131 MMOL/L (ref 136–145)
SPECIFIC GRAVITY UA: 1.02 (ref 1–1.03)
TOTAL PROTEIN: 7.2 G/DL (ref 6.4–8.2)
URINE CULTURE, ROUTINE: NORMAL
URINE TYPE: ABNORMAL
UROBILINOGEN, URINE: 0.2 E.U./DL
WBC # BLD: 21.9 K/UL (ref 4–11)
WBC UA: ABNORMAL /HPF (ref 0–5)

## 2020-01-01 PROCEDURE — 85610 PROTHROMBIN TIME: CPT

## 2020-01-01 PROCEDURE — 71045 X-RAY EXAM CHEST 1 VIEW: CPT

## 2020-01-01 PROCEDURE — 82728 ASSAY OF FERRITIN: CPT

## 2020-01-01 PROCEDURE — 77300 RADIATION THERAPY DOSE PLAN: CPT

## 2020-01-01 PROCEDURE — 93005 ELECTROCARDIOGRAM TRACING: CPT | Performed by: EMERGENCY MEDICINE

## 2020-01-01 PROCEDURE — 85384 FIBRINOGEN ACTIVITY: CPT

## 2020-01-01 PROCEDURE — 96365 THER/PROPH/DIAG IV INF INIT: CPT

## 2020-01-01 PROCEDURE — 87040 BLOOD CULTURE FOR BACTERIA: CPT

## 2020-01-01 PROCEDURE — 83615 LACTATE (LD) (LDH) ENZYME: CPT

## 2020-01-01 PROCEDURE — 96375 TX/PRO/DX INJ NEW DRUG ADDON: CPT

## 2020-01-01 PROCEDURE — 70553 MRI BRAIN STEM W/O & W/DYE: CPT

## 2020-01-01 PROCEDURE — 99285 EMERGENCY DEPT VISIT HI MDM: CPT

## 2020-01-01 PROCEDURE — 6360000002 HC RX W HCPCS: Performed by: EMERGENCY MEDICINE

## 2020-01-01 PROCEDURE — 85025 COMPLETE CBC W/AUTO DIFF WBC: CPT

## 2020-01-01 PROCEDURE — 77373 STRTCTC BDY RAD THER TX DLVR: CPT

## 2020-01-01 PROCEDURE — A9579 GAD-BASE MR CONTRAST NOS,1ML: HCPCS | Performed by: NEUROLOGICAL SURGERY

## 2020-01-01 PROCEDURE — 85730 THROMBOPLASTIN TIME PARTIAL: CPT

## 2020-01-01 PROCEDURE — 70450 CT HEAD/BRAIN W/O DYE: CPT

## 2020-01-01 PROCEDURE — 6360000004 HC RX CONTRAST MEDICATION: Performed by: NEUROLOGICAL SURGERY

## 2020-01-01 PROCEDURE — 77334 RADIATION TREATMENT AID(S): CPT

## 2020-01-01 PROCEDURE — 93010 ELECTROCARDIOGRAM REPORT: CPT | Performed by: INTERNAL MEDICINE

## 2020-01-01 PROCEDURE — 51701 INSERT BLADDER CATHETER: CPT

## 2020-01-01 PROCEDURE — 2580000003 HC RX 258: Performed by: EMERGENCY MEDICINE

## 2020-01-01 PROCEDURE — 81001 URINALYSIS AUTO W/SCOPE: CPT

## 2020-01-01 PROCEDURE — 6370000000 HC RX 637 (ALT 250 FOR IP): Performed by: EMERGENCY MEDICINE

## 2020-01-01 PROCEDURE — 84145 PROCALCITONIN (PCT): CPT

## 2020-01-01 PROCEDURE — 77290 THER RAD SIMULAJ FIELD CPLX: CPT

## 2020-01-01 PROCEDURE — 83605 ASSAY OF LACTIC ACID: CPT

## 2020-01-01 PROCEDURE — 80053 COMPREHEN METABOLIC PANEL: CPT

## 2020-01-01 PROCEDURE — 77295 3-D RADIOTHERAPY PLAN: CPT

## 2020-01-01 PROCEDURE — 77336 RADIATION PHYSICS CONSULT: CPT

## 2020-01-01 PROCEDURE — 87086 URINE CULTURE/COLONY COUNT: CPT

## 2020-01-01 RX ORDER — 0.9 % SODIUM CHLORIDE 0.9 %
30 INTRAVENOUS SOLUTION INTRAVENOUS ONCE
Status: DISCONTINUED | OUTPATIENT
Start: 2020-01-01 | End: 2020-01-01

## 2020-01-01 RX ORDER — LEVETIRACETAM 500 MG/1
500 TABLET ORAL 2 TIMES DAILY
COMMUNITY

## 2020-01-01 RX ORDER — DEXAMETHASONE SODIUM PHOSPHATE 4 MG/ML
4 INJECTION, SOLUTION INTRA-ARTICULAR; INTRALESIONAL; INTRAMUSCULAR; INTRAVENOUS; SOFT TISSUE EVERY 12 HOURS
COMMUNITY

## 2020-01-01 RX ORDER — ACETAMINOPHEN 650 MG/1
650 SUPPOSITORY RECTAL EVERY 6 HOURS PRN
Status: DISCONTINUED | OUTPATIENT
Start: 2020-01-01 | End: 2020-01-01 | Stop reason: HOSPADM

## 2020-01-01 RX ORDER — ALBUTEROL SULFATE 2.5 MG/3ML
SOLUTION RESPIRATORY (INHALATION)
COMMUNITY
Start: 2020-01-01

## 2020-01-01 RX ORDER — 0.9 % SODIUM CHLORIDE 0.9 %
30 INTRAVENOUS SOLUTION INTRAVENOUS ONCE
Status: COMPLETED | OUTPATIENT
Start: 2020-01-01 | End: 2020-01-01

## 2020-01-01 RX ORDER — 0.9 % SODIUM CHLORIDE 0.9 %
1000 INTRAVENOUS SOLUTION INTRAVENOUS ONCE
Status: DISCONTINUED | OUTPATIENT
Start: 2020-01-01 | End: 2020-01-01

## 2020-01-01 RX ORDER — ONDANSETRON 2 MG/ML
4 INJECTION INTRAMUSCULAR; INTRAVENOUS
Status: DISCONTINUED | OUTPATIENT
Start: 2020-01-01 | End: 2020-01-01 | Stop reason: HOSPADM

## 2020-01-01 RX ORDER — LEVOTHYROXINE SODIUM 0.1 MG/1
100 TABLET ORAL DAILY
COMMUNITY
Start: 2009-12-02 | End: 2020-01-01

## 2020-01-01 RX ORDER — SODIUM CHLORIDE 9 MG/ML
1000 INJECTION, SOLUTION INTRAVENOUS CONTINUOUS
Status: DISCONTINUED | OUTPATIENT
Start: 2020-01-01 | End: 2020-01-01 | Stop reason: HOSPADM

## 2020-01-01 RX ORDER — FINASTERIDE 5 MG/1
TABLET, FILM COATED ORAL
COMMUNITY
Start: 2020-03-04 | End: 2020-01-01

## 2020-01-01 RX ADMIN — GADOTERIDOL 22 ML: 279.3 INJECTION, SOLUTION INTRAVENOUS at 09:23

## 2020-01-01 RX ADMIN — SODIUM CHLORIDE 1000 ML: 9 INJECTION, SOLUTION INTRAVENOUS at 05:03

## 2020-01-01 RX ADMIN — PIPERACILLIN AND TAZOBACTAM 3.38 G: 3; .375 INJECTION, POWDER, FOR SOLUTION INTRAVENOUS at 03:25

## 2020-01-01 RX ADMIN — ACETAMINOPHEN 650 MG: 650 SUPPOSITORY RECTAL at 02:11

## 2020-01-01 RX ADMIN — SODIUM CHLORIDE 1701 ML: 9 INJECTION, SOLUTION INTRAVENOUS at 02:11

## 2020-01-01 RX ADMIN — ONDANSETRON 4 MG: 2 INJECTION INTRAMUSCULAR; INTRAVENOUS at 02:10

## 2020-01-01 RX ADMIN — GADOTERIDOL 11 ML: 279.3 INJECTION, SOLUTION INTRAVENOUS at 12:07

## 2020-01-01 ASSESSMENT — PAIN SCALES - GENERAL: PAINLEVEL_OUTOF10: 0

## 2020-01-14 ENCOUNTER — APPOINTMENT (OUTPATIENT)
Dept: GENERAL RADIOLOGY | Age: 80
DRG: 091 | End: 2020-01-14
Attending: PHYSICAL MEDICINE & REHABILITATION
Payer: MEDICARE

## 2020-01-14 ENCOUNTER — HOSPITAL ENCOUNTER (INPATIENT)
Age: 80
LOS: 17 days | Discharge: HOME HEALTH | DRG: 091 | End: 2020-01-31
Attending: PHYSICAL MEDICINE & REHABILITATION | Admitting: PHYSICAL MEDICINE & REHABILITATION
Payer: MEDICARE

## 2020-01-14 PROBLEM — G72.81 CRITICAL ILLNESS MYOPATHY: Status: ACTIVE | Noted: 2020-01-14

## 2020-01-14 PROCEDURE — 6370000000 HC RX 637 (ALT 250 FOR IP): Performed by: PHYSICAL MEDICINE & REHABILITATION

## 2020-01-14 PROCEDURE — 74018 RADEX ABDOMEN 1 VIEW: CPT

## 2020-01-14 PROCEDURE — 1280000000 HC REHAB R&B

## 2020-01-14 PROCEDURE — 94150 VITAL CAPACITY TEST: CPT

## 2020-01-14 RX ORDER — FINASTERIDE 5 MG/1
5 TABLET, FILM COATED ORAL DAILY
Status: DISCONTINUED | OUTPATIENT
Start: 2020-01-14 | End: 2020-01-31 | Stop reason: HOSPADM

## 2020-01-14 RX ORDER — LEVOTHYROXINE SODIUM 0.1 MG/1
100 TABLET ORAL DAILY
Status: DISCONTINUED | OUTPATIENT
Start: 2020-01-15 | End: 2020-01-31 | Stop reason: HOSPADM

## 2020-01-14 RX ORDER — ALBUTEROL SULFATE 2.5 MG/3ML
2.5 SOLUTION RESPIRATORY (INHALATION) EVERY 4 HOURS PRN
Status: DISCONTINUED | OUTPATIENT
Start: 2020-01-14 | End: 2020-01-31 | Stop reason: HOSPADM

## 2020-01-14 RX ORDER — POTASSIUM CHLORIDE 20MEQ/15ML
40 LIQUID (ML) ORAL DAILY
Status: DISCONTINUED | OUTPATIENT
Start: 2020-01-14 | End: 2020-01-16

## 2020-01-14 RX ORDER — DOXAZOSIN MESYLATE 1 MG/1
1 TABLET ORAL DAILY
Status: ON HOLD | COMMUNITY
End: 2020-01-30 | Stop reason: HOSPADM

## 2020-01-14 RX ORDER — ALBUTEROL SULFATE 2.5 MG/3ML
2.5 SOLUTION RESPIRATORY (INHALATION) EVERY 6 HOURS PRN
Status: DISCONTINUED | OUTPATIENT
Start: 2020-01-14 | End: 2020-01-14

## 2020-01-14 RX ORDER — HYDRALAZINE HYDROCHLORIDE 25 MG/1
50 TABLET, FILM COATED ORAL EVERY 6 HOURS PRN
Status: DISCONTINUED | OUTPATIENT
Start: 2020-01-14 | End: 2020-01-31 | Stop reason: HOSPADM

## 2020-01-14 RX ORDER — ATORVASTATIN CALCIUM 10 MG/1
10 TABLET, FILM COATED ORAL NIGHTLY
Status: DISCONTINUED | OUTPATIENT
Start: 2020-01-14 | End: 2020-01-31 | Stop reason: HOSPADM

## 2020-01-14 RX ORDER — ASCORBIC ACID 500 MG
500 TABLET ORAL DAILY
Status: DISCONTINUED | OUTPATIENT
Start: 2020-01-14 | End: 2020-01-31 | Stop reason: HOSPADM

## 2020-01-14 RX ORDER — ONDANSETRON 8 MG/1
8 TABLET, ORALLY DISINTEGRATING ORAL EVERY 8 HOURS PRN
Status: DISCONTINUED | OUTPATIENT
Start: 2020-01-14 | End: 2020-01-31 | Stop reason: HOSPADM

## 2020-01-14 RX ORDER — AMOXICILLIN AND CLAVULANATE POTASSIUM 250; 62.5 MG/5ML; MG/5ML
500 POWDER, FOR SUSPENSION ORAL EVERY 8 HOURS
Status: DISCONTINUED | OUTPATIENT
Start: 2020-01-14 | End: 2020-01-15 | Stop reason: CLARIF

## 2020-01-14 RX ORDER — LANSOPRAZOLE
15 KIT 2 TIMES DAILY
Status: DISCONTINUED | OUTPATIENT
Start: 2020-01-14 | End: 2020-01-31 | Stop reason: HOSPADM

## 2020-01-14 RX ORDER — DOXAZOSIN 2 MG/1
1 TABLET ORAL DAILY
Status: DISCONTINUED | OUTPATIENT
Start: 2020-01-14 | End: 2020-01-31 | Stop reason: HOSPADM

## 2020-01-14 RX ORDER — TRAZODONE HYDROCHLORIDE 50 MG/1
50 TABLET ORAL NIGHTLY PRN
Status: DISCONTINUED | OUTPATIENT
Start: 2020-01-14 | End: 2020-01-31 | Stop reason: HOSPADM

## 2020-01-14 RX ORDER — VITAMIN B COMPLEX
1000 TABLET ORAL DAILY
Status: DISCONTINUED | OUTPATIENT
Start: 2020-01-14 | End: 2020-01-31 | Stop reason: HOSPADM

## 2020-01-14 RX ORDER — ACETAMINOPHEN 160 MG/5ML
650 SOLUTION ORAL EVERY 4 HOURS PRN
Status: DISCONTINUED | OUTPATIENT
Start: 2020-01-14 | End: 2020-01-31 | Stop reason: HOSPADM

## 2020-01-14 RX ADMIN — DOXAZOSIN 1 MG: 2 TABLET ORAL at 23:33

## 2020-01-14 RX ADMIN — LANSOPRAZOLE 15 MG: KIT at 23:33

## 2020-01-14 RX ADMIN — ATORVASTATIN CALCIUM 10 MG: 10 TABLET, FILM COATED ORAL at 23:33

## 2020-01-14 RX ADMIN — AMOXICILLIN AND CLAVULANATE POTASSIUM 500 MG: 250; 62.5 POWDER, FOR SUSPENSION ORAL at 23:33

## 2020-01-14 ASSESSMENT — PAIN SCALES - GENERAL: PAINLEVEL_OUTOF10: 0

## 2020-01-14 NOTE — H&P
Patient: Nehemias Chapin  [id number]  Date: 1/15/2020      Chief Complaint: GI bleed    History of Present Illness/Hospital Course:  Mr. Luca Kang is a 70year old male admitted from [facility] where he presented with dark stools and emesis in the context of existing G and J tubes and h/o duodenal ulcer. He was noted to be hypoxic on presentation as well, with productive cough. Imaging revealed right basilar pneumonia. He was admitted to [facility] and transfused. He was noted to have a linear ulcer in the duodenum attributed to his J tube site. GI evaluated him with recommendations for repeat EGD in the future to assess for possible removal of J tube. He remains on PPI BID. He was treated with zosyn de-escalated to augmentin for pneumonia; he also required steroids. Prior Level of Function:  Independent    Current Level of Function:  Anastasia     has a past medical history of Abnormal leg movement, Aspiration pneumonia ([address] Ataxia, BPH (benign prostatic hyperplasia), Cancer of spine ([address] Carotid stenosis, left, Duodenal ulcer, Encephalopathy, Hyperlipidemia, Hypothyroid, Leukocytosis, Osteopenia, Osteoporosis, Subclavian arterial stenosis ([address] Testosterone deficiency, and Thyroid disease. has a past surgical history that includes Spine surgery (tumor removal); Cataract removal with implant (Left, 8/20/2015); Colonoscopy; Cataract removal with implant (Right, 9-); and gastrostomy (01/17/2019). reports that he has never smoked. He has never used smokeless tobacco. He reports that he does not drink alcohol or use drugs. family history includes Diabetes in his father; Stroke in his mother.     Current Facility-Administered Medications   Medication Dose Route Frequency Provider Last Rate Last Dose    hydrALAZINE (APRESOLINE) tablet 50 mg  50 mg Per G Tube Q6H PRN Chelsea Corrigan MD        ondansetron (ZOFRAN-ODT) disintegrating tablet 8 mg  8 mg Per G Tube Q8H PRN Chelsea Corrigan MD       [facility] (APRESOLINE) tablet 50 mg  50 mg Per G Tube Q6H PRN Stacie Stafford MD        ondansetron (ZOFRAN-ODT) disintegrating tablet 8 mg  8 mg Per G Tube Q8H PRN Stacie Stafford MD        traZODone (DESYREL) tablet 50 mg  50 mg Per G Tube Nightly PRN Stacie Stafford MD        amoxicillin-clavulanate (AUGMENTIN) 250-62.5 MG/5ML suspension 500 mg  500 mg Per G Tube Q8H Stacie Stafford MD   500 mg at 01/15/20 0556    vitamin C (ASCORBIC ACID) tablet 500 mg  500 mg Per G Tube Daily Stacie Stafford MD   500 mg at 01/15/20 0941    atorvastatin (LIPITOR) tablet 10 mg  10 mg Per G Tube Nightly Stacie Stafford MD   10 mg at 01/14/20 2333    Vitamin D (CHOLECALCIFEROL) tablet 1,000 Units  1,000 Units Per G Tube Daily Stacie Stafford MD   1,000 Units at 01/15/20 0941    doxazosin (CARDURA) tablet 1 mg  1 mg Per G Tube Daily Stacie Stafford MD   1 mg at 01/15/20 0941    finasteride (PROSCAR) tablet 5 mg  5 mg Per G Tube Daily Stacie Stafford MD   5 mg at 01/15/20 0941    levothyroxine (SYNTHROID) tablet 100 mcg  100 mcg Per G Tube Daily Stacie Stafford MD   100 mcg at 01/15/20 0556    pediatric multivitamin-iron (POLY-VI-SOL with IRON) solution 5 mL  5 mL Per G Tube Daily Stacie Stafford MD   5 mL at 01/15/20 0941    potassium chloride 20 MEQ/15ML (10%) oral solution 40 mEq  40 mEq Per G Tube Daily Stacie Stafford MD   40 mEq at 01/15/20 0941    acetaminophen (TYLENOL) 160 MG/5ML solution 650 mg  650 mg Oral Q4H PRN Stacie Stafford MD        magnesium hydroxide (MILK OF MAGNESIA) 400 MG/5ML suspension 30 mL  30 mL Oral Daily PRN Stacie Stafford MD        lansoprazole suspension SUSP 15 mg  15 mg Per G Tube BID Stacie Stafford MD   15 mg at 01/15/20 0954    albuterol (PROVENTIL) nebulizer solution 2.5 mg  2.5 mg Nebulization Q4H PRN Stacie Stafford MD             REVIEW OF SYSTEMS:   CONSTITUTIONAL: negative for fevers, chills, diaphoresis, activity change, appetite change, fatigue, night sweats and unexpected weight change. EYES: negative for blurred vision, eye discharge, visual disturbance and icterus. HEENT: negative for hearing loss, tinnitus, ear drainage, sinus pressure, nasal congestion, epistaxis and snoring. RESPIRATORY: Negative for hemoptysis, cough, sputum production. CARDIOVASCULAR: negative for chest pain, palpitations, exertional chest pressure/discomfort, edema, syncope. GASTROINTESTINAL: negative for nausea, vomiting, diarrhea, constipation, blood in stool and abdominal pain. GENITOURINARY: negative for frequency, dysuria, urinary incontinence, decreased urine volume, and hematuria. HEMATOLOGIC/LYMPHATIC: negative for easy bruising, bleeding and lymphadenopathy. ALLERGIC/IMMUNOLOGIC: negative for recurrent infections, angioedema, anaphylaxis and drug reactions. ENDOCRINE: negative for weight changes and diabetic symptoms including polyuria, polydipsia and polyphagia. MUSCULOSKELETAL: negative for pain, joint swelling, decreased range of motion and muscle weakness. NEUROLOGICAL: negative for headaches, slurred speech, unilateral weakness. PSYCHIATRIC/BEHAVIORAL: negative for hallucinations, behavioral problems, confusion and agitation. All pertinent positives are noted in the HPI. Physical Examination:  Vitals:   Patient Vitals for the past 24 hrs:   BP Temp Temp src Pulse Resp SpO2 Height Weight   01/15/20 0800 136/72 97.4 °F (36.3 °C) Oral 81 16 99 % -- --   01/14/20 2333 127/74 -- -- -- -- -- -- --   01/14/20 1930 121/71 97.5 °F (36.4 °C) Oral 94 18 95 % 5' 5\" (1.651 m) 125 lb 14.1 oz (57.1 kg)     Psych: Stable mood, normal judgement, normal affect   Const: No distress  Eyes: Conjunctiva noninjected, no icterus noted; pupils equal, round, and reactive to light. HENT: Atraumatic, normocephalic; Oral mucosa moist  Neck: Trachea midline, neck supple. No thyromegaly noted.   CV: Regular rate and rhythm, no murmur rub or gallop noted  Resp: rare bilateral rales R > L   GI: Soft, nontender, nondistended. Normal bowel sounds. No palpable masses. G and J tube sites c/d/i  Neuro: Alert, oriented, appropriate. No cranial nerve deficits appreciated. Sensation intact to light touch. Nonfocal weakness. Skin: Normal temperature and turgor  MSK: No joint abnormalities noted. Ext: No significant edema appreciated. No varicosities. Lab Results   Component Value Date    WBC 10.2 01/15/2020    HGB 13.2 (L) 01/15/2020    HCT 40.1 (L) 01/15/2020    MCV 95.2 01/15/2020     01/15/2020     Lab Results   Component Value Date    INR 1.15 (H) 01/07/2019    PROTIME 13.1 (H) 01/07/2019     Lab Results   Component Value Date    CREATININE 0.7 (L) 01/15/2020    BUN 36 (H) 01/15/2020     01/15/2020    K 3.3 (L) 01/15/2020     01/15/2020    CO2 31 01/15/2020     Lab Results   Component Value Date    ALT 24 01/15/2020    AST 24 01/15/2020    ALKPHOS 80 01/15/2020    BILITOT 0.5 01/15/2020     Study Conclusions    - Left ventricle: The cavity size is normal. Left ventricular    geometry shows evidence of concentric remodeling, with normal    ventricular mass and mildly increased relative wall thickness.    Systolic function was normal. The estimated ejection fraction was    59%. - Aortic valve: There was trivial regurgitation. The mean systolic    gradient is 5mm Hg. The peak systolic gradient is 9mm Hg.  - Mitral valve: There was mild regurgitation. The above laboratory data have been reviewed. The above imaging data have been reviewed. The above medical testing have been reviewed. Body mass index is 20.95 kg/m². Barriers to Discharge: Weakness, endurance, medical comorbidities    POST ADMISSION PHYSICIAN EVALUATION  The patient has agreed to being admitted to our comprehensive inpatient  rehabilitation facility consisting of at least 180 minutes of therapy a day,  5 out of 7 days a week.     The patient/family has a good understanding of our discharge process. The  patient has potential to make improvement and is in need of at least two of  the following multidisciplinary therapies including but not limited to  physical, occupational, respiratory, and speech, nutritional services, wound care, and prosthetics and orthotics. Given the patients complex condition  and risk of further medical complications, rehabilitation services cannot be  safely provided at a lower level of care such as a skilled nursing facility. I have compared the patients medical and functional status at the time of the  preadmission screening and the same on this date, and there are no significant changes. By signing this document, I acknowledge that I have personally performed a  full physical examination on this patient within 24 hours of admission to  this inpatient rehabilitation facility and have determined the patient to be  able to tolerate the above course of treatment at an intensive level for a  reasonable period of time. I will be completing a detailed individualized  Plan of Care for this patient by day four of the patients stay based upon the  Preadmission Screen, this Post-Admission Evaluation, and the therapy  evaluations. Rehabilitation Diagnosis:  Neurologic, 3.8, Neuromuscular Disorders, e.g. Critical Illness Myopathy, Other Myopathy    Assessment and Plan:  Critical illness myopathy. Diffusely weak. PT/OT. Nutritional support; dietary consulted. Resolving pneumonia. Complete course of abx. Pulmonary toilet. Mobilization. Acute blood loss anemia secondary to GI bleed. Outpatient evaluation by GI in 1 month. Consulted pharmacy to obtain a PPI in liquid form to be given per tube. 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 SLP consulted. Bowels: Schedule colace + senna. Follow bowel movements. Enema or suppository if needed. Bladder: Check PVR x 3. [address] if PVR > 200ml or if any volume is > 500 ml.      Sleep: Trazodone provided prn. Tony Ann MD 1/15/2020, 2:46 PM

## 2020-01-15 LAB
A/G RATIO: 1.1 (ref 1.1–2.2)
ALBUMIN SERPL-MCNC: 3.1 G/DL (ref 3.4–5)
ALP BLD-CCNC: 80 U/L (ref 40–129)
ALT SERPL-CCNC: 24 U/L (ref 10–40)
ANION GAP SERPL CALCULATED.3IONS-SCNC: 10 MMOL/L (ref 3–16)
AST SERPL-CCNC: 24 U/L (ref 15–37)
BILIRUB SERPL-MCNC: 0.5 MG/DL (ref 0–1)
BUN BLDV-MCNC: 36 MG/DL (ref 7–20)
CALCIUM SERPL-MCNC: 8.5 MG/DL (ref 8.3–10.6)
CHLORIDE BLD-SCNC: 101 MMOL/L (ref 99–110)
CO2: 31 MMOL/L (ref 21–32)
CREAT SERPL-MCNC: 0.7 MG/DL (ref 0.8–1.3)
GFR AFRICAN AMERICAN: >60
GFR NON-AFRICAN AMERICAN: >60
GLOBULIN: 2.9 G/DL
GLUCOSE BLD-MCNC: 128 MG/DL (ref 70–99)
HCT VFR BLD CALC: 40.1 % (ref 40.5–52.5)
HEMOGLOBIN: 13.2 G/DL (ref 13.5–17.5)
MAGNESIUM: 2.3 MG/DL (ref 1.8–2.4)
MCH RBC QN AUTO: 31.3 PG (ref 26–34)
MCHC RBC AUTO-ENTMCNC: 32.9 G/DL (ref 31–36)
MCV RBC AUTO: 95.2 FL (ref 80–100)
PDW BLD-RTO: 17.2 % (ref 12.4–15.4)
PLATELET # BLD: 288 K/UL (ref 135–450)
PLATELET SLIDE REVIEW: ADEQUATE
PMV BLD AUTO: 10.2 FL (ref 5–10.5)
POTASSIUM REFLEX MAGNESIUM: 3.3 MMOL/L (ref 3.5–5.1)
RBC # BLD: 4.21 M/UL (ref 4.2–5.9)
SLIDE REVIEW: ABNORMAL
SODIUM BLD-SCNC: 142 MMOL/L (ref 136–145)
TOTAL PROTEIN: 6 G/DL (ref 6.4–8.2)
WBC # BLD: 10.2 K/UL (ref 4–11)

## 2020-01-15 PROCEDURE — 85027 COMPLETE CBC AUTOMATED: CPT

## 2020-01-15 PROCEDURE — 80053 COMPREHEN METABOLIC PANEL: CPT

## 2020-01-15 PROCEDURE — 97162 PT EVAL MOD COMPLEX 30 MIN: CPT

## 2020-01-15 PROCEDURE — 92526 ORAL FUNCTION THERAPY: CPT

## 2020-01-15 PROCEDURE — 97167 OT EVAL HIGH COMPLEX 60 MIN: CPT

## 2020-01-15 PROCEDURE — 36415 COLL VENOUS BLD VENIPUNCTURE: CPT

## 2020-01-15 PROCEDURE — 97530 THERAPEUTIC ACTIVITIES: CPT

## 2020-01-15 PROCEDURE — 97535 SELF CARE MNGMENT TRAINING: CPT

## 2020-01-15 PROCEDURE — 92522 EVALUATE SPEECH PRODUCTION: CPT

## 2020-01-15 PROCEDURE — 97116 GAIT TRAINING THERAPY: CPT

## 2020-01-15 PROCEDURE — 6370000000 HC RX 637 (ALT 250 FOR IP): Performed by: PHYSICAL MEDICINE & REHABILITATION

## 2020-01-15 PROCEDURE — 1280000000 HC REHAB R&B

## 2020-01-15 PROCEDURE — 83735 ASSAY OF MAGNESIUM: CPT

## 2020-01-15 PROCEDURE — 92610 EVALUATE SWALLOWING FUNCTION: CPT

## 2020-01-15 RX ORDER — IPRATROPIUM BROMIDE AND ALBUTEROL SULFATE 2.5; .5 MG/3ML; MG/3ML
1 SOLUTION RESPIRATORY (INHALATION)
Status: DISCONTINUED | OUTPATIENT
Start: 2020-01-15 | End: 2020-01-15

## 2020-01-15 RX ORDER — AMOXICILLIN AND CLAVULANATE POTASSIUM 400; 57 MG/5ML; MG/5ML
500 POWDER, FOR SUSPENSION ORAL EVERY 8 HOURS
Status: DISCONTINUED | OUTPATIENT
Start: 2020-01-15 | End: 2020-01-16

## 2020-01-15 RX ORDER — IPRATROPIUM BROMIDE AND ALBUTEROL SULFATE 2.5; .5 MG/3ML; MG/3ML
1 SOLUTION RESPIRATORY (INHALATION) EVERY 4 HOURS PRN
Status: DISCONTINUED | OUTPATIENT
Start: 2020-01-15 | End: 2020-01-31 | Stop reason: HOSPADM

## 2020-01-15 RX ADMIN — LANSOPRAZOLE 15 MG: KIT at 22:46

## 2020-01-15 RX ADMIN — OXYCODONE HYDROCHLORIDE AND ACETAMINOPHEN 500 MG: 500 TABLET ORAL at 09:41

## 2020-01-15 RX ADMIN — AMOXICILLIN AND CLAVULANATE POTASSIUM 500 MG: 250; 62.5 POWDER, FOR SUSPENSION ORAL at 15:14

## 2020-01-15 RX ADMIN — DOXAZOSIN 1 MG: 2 TABLET ORAL at 09:41

## 2020-01-15 RX ADMIN — FINASTERIDE 5 MG: 5 TABLET, FILM COATED ORAL at 09:41

## 2020-01-15 RX ADMIN — AMOXICILLIN AND CLAVULANATE POTASSIUM 500 MG: 400; 57 POWDER, FOR SUSPENSION ORAL at 22:48

## 2020-01-15 RX ADMIN — POTASSIUM CHLORIDE 40 MEQ: 20 SOLUTION ORAL at 09:41

## 2020-01-15 RX ADMIN — LEVOTHYROXINE SODIUM 100 MCG: 100 TABLET ORAL at 05:56

## 2020-01-15 RX ADMIN — ATORVASTATIN CALCIUM 10 MG: 10 TABLET, FILM COATED ORAL at 22:46

## 2020-01-15 RX ADMIN — LANSOPRAZOLE 15 MG: KIT at 09:54

## 2020-01-15 RX ADMIN — VITAMIN D, TAB 1000IU (100/BT) 1000 UNITS: 25 TAB at 09:41

## 2020-01-15 RX ADMIN — PEDIATRIC MULTIPLE VITAMINS W/ IRON DROPS 10 MG/ML 5 ML: 10 SOLUTION at 09:41

## 2020-01-15 RX ADMIN — AMOXICILLIN AND CLAVULANATE POTASSIUM 500 MG: 250; 62.5 POWDER, FOR SUSPENSION ORAL at 05:56

## 2020-01-15 ASSESSMENT — PAIN SCALES - WONG BAKER: WONGBAKER_NUMERICALRESPONSE: 0

## 2020-01-15 ASSESSMENT — PAIN SCALES - GENERAL: PAINLEVEL_OUTOF10: 0

## 2020-01-15 NOTE — CARE COORDINATION
Writer met with patient and spouse to discuss disposition of care conference with interdisciplinary team on 1/15/2020. Discussed:  Spouse states she just needs him to \"be able to stand and pivot\". Goal is to return to home setting. Recommendations: to be determined pending assessments. Anticipated discharge date: to be determined. Patient and spouse verbalized understanding of recommendations and anticipated discharge date.      Belle Mehta, LIAN

## 2020-01-15 NOTE — PLAN OF CARE
Problem: Nutrition  Intervention: Swallowing evaluation  Note:   SLP evaluation completed. All further notes may be found in EMR. Taj Dhillon MS-CCC-SLP [id number]   Intervention: Aspiration precautions  Note:   SLP evaluation completed. All further notes may be found in EMR.     Taj Dhillon MS-CCC-SLP [id number]

## 2020-01-15 NOTE — PROGRESS NOTES
laundry)  Ambulation Assistance: Needs assistance(with RW and physical assist)  Transfer Assistance: Needs assistance  Active : No  Occupation: Retired  Type of occupation:  for [employer]  Additional Comments: Pt stated he would like to walk up and down the stairs again, but per wife has not done them for approximately 8 months  Cognition   Cognition  Overall Cognitive Status: [facility]  Cognition Comment: Difficult to wake up initially during session. Objective     Observation/Palpation  Posture: Fair    PROM RLE (degrees)  RLE PROM: WFL  AROM RLE (degrees)  RLE AROM: WFL  PROM LLE (degrees)  LLE PROM: WFL  AROM LLE (degrees)  LLE AROM : WFL  Strength RLE  Strength RLE: Exception  Comment: Grossly 3/5  Strength LLE  Strength LLE: Exception  Comment: Grossly 3/5  Tone RLE  RLE Tone: Normotonic  Tone LLE  LLE Tone: Normotonic  Sensation  Overall Sensation Status: [facility]  Bed mobility  Rolling to Left: Minimal assistance(with use of bed rails and HOB 30 degrees; pt has a hospital bed at home.)  Rolling to Right: Minimal assistance(with use of bed rails and HOB 30 degrees; pt has a hospital bed at home.)  Supine to Sit: Moderate assistance  Sit to Supine: Moderate assistance  Scooting: Moderate assistance;Minimal assistance(to the EOB or chair)  Transfers  Sit to Stand: Maximum Assistance(Requires cues for bringing hip anteriorly, straightening knees, and pulling shoulders back. Pt intially stands with a posterior lean and requires cues and assist to lean forward into neutral.)  Stand to sit: Maximum Assistance  Bed to Chair: Maximum assistance(with RW and constant VC's. )  Stand Pivot Transfers: Maximum Assistance(with RW or therapist anteiror to pt for support and balance.  Pt performs better with RW.)  Car Transfer: Maximum Assistance(with RW.)  Ambulation  Ambulation?: Yes  More Ambulation?: No  Ambulation 1  Surface: level tile  Device: Rolling Walker  Assistance: Maximum assistance  Quality of Gait: Pt tim

## 2020-01-15 NOTE — PATIENT CARE CONFERENCE
[facility]  Inpatient Rehabilitation  Weekly Team Conference Note    Date: 1/15/2020  Patient Name: Maria Dolores Larios        MRN: [id number]    : 1940  (70 y.o.)  Gender: male   Referring Practitioner: Dr. Sandy Maher         Interventions to be utilized toward barriers to discharge, per discipline:  [address] observed barriers to dc: Confusion, Upper extremity weakness, Lower extremity weakness, Long standing deficits, Impaired vision, Incontinence of bowel, Incontinence of bladder, Medical complications and Medication managment  Nursing interventions: Assist with ADL's, medication management  Family Education: No  Fall Risk:  Yes      Physical therapy observed barriers to dc:    Baseline: CGA with RW, ambulating 50 feet, assist with all ADLs   Current level: Min A for bed mobility, Mod A for supine<>sit, Max A for sit<>stand, ambulation and transfers with RW, and Min A for w/c mobility 350 feet   Barriers to DC: CGA<>SBA for all mobility to return home with wife assist   Needs in order to achieve dc home/next level of care: Decrease assistance levels, increase activity tolerance      Physical therapy interventions:   Current Treatment Recommendations: Strengthening, ROM, Balance Training, Functional Mobility Training, Transfer Training, ADL/Self-care Training, Cognitive/Perceptual Training, Endurance Training, Stair training, Gait Training, Wheelchair Mobility Training, Neuromuscular Re-education, Cognitive Reorientation, Patient/Caregiver Education & Training, Safety Education & Training, Home Exercise Program, Equipment Evaluation, Education, & procurement, Positioning      PHYSICAL THERAPY      Occupational therapy observed barriers to dc:    Baseline: Some assist for mobility up to 50' and transfers (unsure of level), needs assist for LE ADL/toileting, has assist for most IADL   Current level:  Max A-Total assist for functional transfers, mobility, most UE/LE ADL and toileting   Barriers to DC: balance, safety awareness, involuntary movement   Needs in order to achieve dc home/next level of care: Min A for functional transfers/mobility, Mod A for toileting/LE ADL, continue to assess     Occupational Therapy interventions:  Current Treatment Recommendations: Strengthening, Balance Training, Functional Mobility Training, Endurance Training, Neuromuscular Re-education, Patient/Caregiver Education & Training, Equipment Evaluation, Education, & procurement, Self-Care / ADL, Safety Education & Training, Positioning, Gait Training      OCCUPATIONAL THERAPY      Assessment: Pt pleasant and motivated to participate with therapy this date. Per pt and chart review, pt has assistance from wife for functional mobility/transfers, BADL as needed and almost all IADL. Pt required Max A for functional transfers at time of eval, and DEP for LE ADL/toileting, with ataxic movement noted throughout session. Continue to assess cognition, as pt required cues for safety/sequencing/initiation during session, but pt is also [facility] and dysarthric. Speech therapy observed barriers to dc:    Baseline: 24 hr assist from wife   Current level: Cog deficits, dysarthria, NPO for dysphagia   Barriers to DC: Pre-existing cognitive deficits, h/o dysphagia, reduced safety   Needs in order to achieve dc home/next level of care: improvement of safety awareness/deficits    Speech Therapy interventions:  Dysphagia: Oral care, PO trials with SLP only, safe swallow precautions  Speech/Language/Cognition: Memory, problem solving, speech intelligibility      SPEECH THERAPY :   Assessment: Pt lethargic. He's NPO with severe pharyngeal dysphagia. Mild dysarthria. Cognitive deficits noted in the areas of memory and reduced attention, problem solving, and executive functioning; ST to further assess. NUTRITION  Weight: 125 lb 14.1 oz (57.1 kg) / Body mass index is 20.95 kg/m².   Diet Order: DIET TUBE FEED CONTINUOUS/CYCLIC NPO; STANDARD WITH FIBER; Jejunostomy; Cyclic; 25; 70; 10     Education: Not yet provided      CASE MANAGEMENT  Assessment: pending social service assessment. NPO G and J Tubes, longstanding. No length of stay noted at this time. Feed through J tube, meds through G. Interdisciplinary Goals:   1.) Patient and family will understand safe medication management  2.) Patient will transfer from bed<>w/c with Mod A x1 and RW.  3.) Pt will complete LE dressing with Max Ax1  4.) Pt and family will demonstrate safety awareness and appropriate call light use.  5.) Staff will assist pt in safe oral care guidelines. Discharge Plan   Estimated discharge date: tbd  Destination: discharge home with supervision  Pass:No  Services at Discharge: tbd  Equipment at Discharge: tbd    Team Members Present at Conference:  : [address] [place] [address]    Occupational Therapist: Sravan Aguilera, OTR/L  Physical Therapist: Bal Roe, PT, DPT  Speech Therapist: Agapito Tomlinson, [address]  Nurse: Vannessa Jones RN  Dietician: Sloane Gagnon RDN, LD  : N/A  Psychiatry: N/A    Family members present at conference: N/A      I led this team conference and I approve the established interdisciplinary plan of care as documented within the medical record of Cassi Dawkins. MD: Keanu Clements.  Janelle Tolliver MD 1/15/2020, 3:05 PM

## 2020-01-15 NOTE — CONSULTS
Accumulation-No significant fluid accumulation,    6.  Strength-Not measured    Nutrition Risk Level: High(New TF)    Nutrition Needs:  · Estimated Daily Total Kcal: 8824-8521(06-51 kcal/kg)  · Estimated Daily Protein (g): 57-69(1.0-1.2 g/kg)  · Estimated Daily Fluid (ml/day): 1 ml/kcal    Nutrition Diagnosis:   · Problem: Inadequate oral intake  · Etiology: related to Difficulty swallowing     Signs and symptoms:  as evidenced by Nutrition support - EN    Objective Information:  · Nutrition-Focused Physical Findings: No edema. Skin intact.    · Wound Type: None  · Current Nutrition Therapies:  · Oral Diet Orders: NPO   · Oral Diet intake: Unable to assess  · Oral Nutrition Supplement (ONS) Orders: None  · ONS intake: Unable to assess  · Tube Feeding (TF) Orders:   · Feeding Route: Jejunostomy  · Formula: Fluid Restricted(TwoCal HN)  · Duration: Cyclic  · Goal TF & Flush Orders Provides: Recommend nocturnal feed of TwoCal HN (fluid restricted formula) at goal rate of 70 ml/hr x 10 hr (suggest 8p-6a) to provide 700 ml TV, 1400 kcal, 58 gm protein, and 490 ml free fluid + 150 ml H20 flush q 4 hr or per MD  · Anthropometric Measures:  · Ht: 5' 5\" (165.1 cm)   · Current Body Wt: 125 lb 14.1 oz (57.1 kg)  · Admission Body Wt: 125 lb 14.1 oz (57.1 kg)  · Weight Change: Pt endorses stable weight Hx - PADMINI d/t lack of weight Hx in EMR   · Ideal Body Wt: 136 lb (61.7 kg),  · BMI Classification: BMI 18.5 - 24.9 Normal Weight    Nutrition Interventions:   Modify current Tube Feeding, Continue NPO(Diet advancement per SLP recommendations)  Continued Inpatient Monitoring    Nutrition Evaluation:   · Evaluation: Goals set   · Goals: Pt will tolerate TF at goal rate during ARU stay   · Monitoring: TF Intake, TF Tolerance, Nutrition Progression, Weight, Pertinent Labs      Electronically signed by Mortimer Merl, RD, NEO on 1/15/20 at 2:14 PM    Contact Number: Office: [phone]; [address]: [id number]

## 2020-01-15 NOTE — PLAN OF CARE
Problem: Nutrition  Goal: Optimal nutrition therapy  Outcome: Ongoing  Note:   Nutrition Problem: Inadequate oral intake  Intervention: Food and/or Nutrient Delivery: Modify current Tube Feeding, Continue NPO(Diet advancement per SLP recommendations)  Nutritional Goals: Pt will tolerate TF at goal rate during ARU stay

## 2020-01-15 NOTE — PROGRESS NOTES
Signs   /71   Pulse 94   Temp 97.5 °F (36.4 °C) (Oral)   Resp 18   Ht 5' 5\" (1.651 m)   Wt 125 lb 14.1 oz (57.1 kg)   SpO2 95%   BMI 20.95 kg/m²   SPO2 (COPD values may differ): Greater than or equal to 92% on room air = 0    Peak Flow (asthma only): not applicable = 0    RSI: 0-4 = See once and convert to home regimen or discontinue        Plan       Goals: medication delivery    Patient/caregiver was educated on the proper method of use for Respiratory Care Devices:  Yes      Level of patient/caregiver understanding able to:   ? Verbalize understanding   ? Demonstrate understanding       ? Teach back        ? Needs reinforcement       ? No available caregiver               ? Other:     Response to education:  Excellent     Is patient being placed on Home Treatment Regimen? Yes     Does the patient have everything they need prior to discharge? NA     Comments: pt seen and chart reviewed    Plan of Care: albuterol prn    Electronically signed by Helene Berkowitz RCP on 1/14/2020 at 9:49 PM    Respiratory Protocol Guidelines     1. Assessment and treatment by Respiratory Therapy will be initiated for medication and therapeutic interventions upon initiation of aerosolized medication. 2. Physician will be contacted for respiratory rate (RR) greater than 35 breaths per minute. Therapy will be held for heart rate (HR) greater than 140 beats per minute, pending direction from physician. 3. Bronchodilators will be administered via Metered Dose Inhaler (MDI) with spacer when the following criteria are met:  a. Alert and cooperative     b. HR < 140 bpm  c. RR < 30 bpm                d. Can demonstrate a 2-3 second inspiratory hold  4. Bronchodilators will be administered via Hand Held Nebulizer LILIAN [facility]) to patients when ANY of the following criteria are met  a. Incognizant or uncooperative          b. Patients treated with HHN at Home        c. Unable to demonstrate proper use of MDI with spacer     d.  RR > 30 bpm   5. Bronchodilators will be delivered via Metered Dose Inhaler (MDI), HHN, Aerogen to intubated patients on mechanical ventilation. 6. Inhalation medication orders will be delivered and/or substituted as outlined below. Aerosolized Medications Ordering and Administration Guidelines:    1. All Medications will be ordered by a physician, and their frequency and/or modality will be adjusted as defined by the patients Respiratory Severity Index (RSI) score. 2. If the patient does not have documented COPD, consider discontinuing anticholinergics when RSI is less than 9.  3. If the bronchospasm worsens (increased RSI), then the bronchodilator frequency can be increased to a maximum of every 4 hours. If greater than every 4 hours is required, the physician will be contacted. 4. If the bronchospasm improves, the frequency of the bronchodilator can be decreased, based on the patient's RSI, but not less than home treatment regimen frequency. 5. Bronchodilator(s) will be discontinued if patient has a RSI less than 9 and has received no scheduled or as needed treatment for 72  Hrs. Patients Ordered on a Mucolytic Agent:    1. Must always be administered with a bronchodilator. 2. Discontinue if patient experiences worsened bronchospasm, or secretions have lessened to the point that the patient is able to clear them with a cough. Anti-inflammatory and Combination Medications:    1. If the patient lacks prior history of lung disease, is not using inhaled anti-inflammatory medication at home, and lacks wheezing by examination or by history for at least 24 hours, contact physician for possible discontinuation.

## 2020-01-15 NOTE — PLAN OF CARE
protocol, seating and positioning, skin/wound care, pressure relief instruction,dressing changes,  infection protection, DVT prophylaxis, and/or assistance with in room safety with transfers to bed, toilet, wheelchair, shower as well as bathroom activities and hygiene. Patient/caregiver education for:   [x] Disease/sustained injury/management      [x] Medication Use   [] Surgical intervention   [x] Safety   [x] Body mechanics and or joint protection   [x] Health maintenance         PHYSICAL THERAPY:  Goals:                  Short term goals  Time Frame for Short term goals: 1 week, 7 days, by 1/22/20  Short term goal 1: Pt will perform supine<>sit with CGA. Short term goal 2: Pt will perform sit<>stand with LRAD and Min A x1. Short term goal 3: Pt will ambulate 50 feet with LRAD and Mod A x1. Short term goal 4: Pt will participate in 12-15 reps of BLE ther ex to increase strength and balance. Long term goals  Time Frame for Long term goals : 3 weeks, 21 days, 2/5/20  Long term goal 1: Pt will perform supine<>sit with SBA. Long term goal 2: Pt will perform sit<>stand with LRAD and SBA. Long term goal 3: Pt will ambulate 75 feet with LRAD and CGA. Long term goal 4: Pt will propel the w/c 500 feet with supervision including 2 turns to the right and left. These goals were reviewed with this patient at the time of assessment and Maria Dolores Larios is in agreement.      Plan of Care: Pt to be seen 5 out of 7 days per week, 60   mins (exact) per day for 21 days (exact)                   Current Treatment Recommendations: Strengthening, ROM, Balance Training, Functional Mobility Training, Transfer Training, ADL/Self-care Training, Cognitive/Perceptual Training, Endurance Training, Stair training, Gait Training, Wheelchair Mobility Training, Neuromuscular Re-education, Cognitive Reorientation, Patient/Caregiver Education & Training, Safety Education & Training, Home Exercise Program, Equipment Evaluation, cues.  Goal 4: Pt will recall new info per 10 min delay with min cues. Goal 5: Pt will utilize compensatory speech strategies with min cues and 90% speech intelligibility. Timeframe for Short-term Goals: 10 days (by 1/23/20)  These goals were reviewed with this patient at the time of assessment and Barbi Arellano is in agreement    Plan of Care: Pt to be seen 5 out of 7 days per week, 60 mins (exact) per day for 21 days (exact)             Dysphagia: Therapeutic Interventions: Laryngeal exercises, Patient/Family education, Therapeutic PO trials with SLPTia, Pharyngeal exercises, Tongue base strengthening, Mendelsohn, Vital Stim/NMES, Effortful swallow, Oral care           Speech/Language/Cognition: Further cognitive assessment to be determined. Moderate dysarthria. Speech strategies to improve speech intelligibility. CASE MANAGEMENT:  Goals:   Assist patient/family with discharge planning, patient/family counseling,   and coordination with insurance during ARU stay. Admission Period/Goal FIM SCORES  FIM Admit/Goal Score   Eating/Swallowing    Grooming    Bathing    Upper Body Dressing    Lower Body Dressing    Toileting    Bladder Drew, Accidents = FIM    Bowel  Drew, Accidents = FIM    Bed/Chair Transfer    Toilet Transfer    Tub/Shower Transfer     Locomotion:  Ambulation (Walk) / Wheelchair:  W = walk , w/c = wheelchair  Distance:   1= 0-49 ft , 2=  ft, 3= 150+ ft Distance:    Level of Assist:    Mode:    FIM:       Stairs    Comprehension    Expression    Social Interaction    Problem Solving    Memory         Barbi Arellano will be seen a minimum of 3 hours of therapy per day, a minimum of 5 out of 7 days per week. [] In this rare instance due to the nature of this patient's medical involvement, this patient will be seen 15 hours per week (900 minutes within a 7 day period).     Treatments may include therapeutic exercises, gait training, neuromuscular re-ed, transfer

## 2020-01-15 NOTE — PROGRESS NOTES
Speech Language Pathology  Facility/Department: [facility]   CLINICAL BEDSIDE SWALLOW EVALUATION    NAME: Nehemias Chapin  : 1940  MRN: [id number]    ADMISSION DATE: 2020  ADMITTING DIAGNOSIS: has Medication monitoring encounter; Age-related osteoporosis without current pathological fracture; Thyroid disease; Abnormal leg movement; Hyperlipidemia; Pneumonia; Febrile illness; Senile osteoporosis; and Critical illness myopathy on their problem list.  ONSET DATE: 20    Recent Chest Xray/CT of Chest:  20: reticulonodular opacities RLL new from previous exam. Consideration for PNA. Date of Eval: 1/15/2020  Evaluating Therapist: Mayo Rodriguez    Current Diet level:  Current Diet : NPO    Primary Complaint  Patient Complaint: Pt and spouse hope for pt to be able to eat again someday. Pain:  Pain Assessment  Pain Assessment: 0-10  Pain Level: 0    Reason for Referral  Nehemias Chapin was referred for a bedside swallow evaluation to assess the efficiency of his swallow function, identify signs and symptoms of aspiration and make recommendations regarding safe dietary consistencies, effective compensatory strategies, and safe eating environment. Impression  Dysphagia Diagnosis: Mild oral stage dysphagia; Moderate to severe pharyngeal stage dysphagia  Dysphagia Outcome Severity Scale: Level 1: Severe dysphagia- NPO. Unable to tolerate any PO safely     Pt lethargic. Pt has been NPO for ~ 1 yr with the exception of eating some soft foods/thickened liquids in the summer of . G-tube was placed in 2019, then J-tube 2019, then G-tube 2019. With cues for alertness, pt was able to participate in the evaluation. SLP performed oral care with toothbrush/paste and rinse and spit. Ice chip x 1 and 1/2 tsp puree x 1 presented post oral care. Pt presents with mild oral dysphagia characterized by reduced bolus formation and increase AP transit with all consistencies.  Reduced labial seal around spoon also occurred. Suspected moderate-severe pharyngeal dysphagia characterized by a delayed swallow with reduced hyolaryngeal elevation and excursion, wet vocal quality post swallow, immediate cough post swallow, and delayed TC post swallow. Continued s/s of penetration/aspiration with PO trials. Recommend continued strict NPO status with oral care 2-3x/day. Recommend oral care with toothbrush/toohpaste, water rinse, and spit. [id number] Joan Gomez for wife to perform oral care. ST to follow for continued dysphagia education and treatment. Treatment Plan  Requires SLP Intervention: Yes  Duration/Frequency of Treatment: 5x/wk  D/C Recommendations: To be determined; 24 hr supervision     Recommended Diet and Intervention  Diet Solids Recommendation: NPO  Liquid Consistency Recommendation: NPO  Recommended Form of Meds: Via alternative means of nutrition  Recommendations: NPO;Dysphagia treatment; Therapeutic feeds with SLP only  Therapeutic Interventions: Laryngeal exercises; Patient/Family education; Therapeutic PO trials with SLP;Tia;Pharyngeal exercises; Tongue base strengthening;Mendelsohn;Vital Stim/NMES;Effortful swallow;Oral care    Treatment/Goals  Short-term Goals: 10 days (by 1/23/20)  Goal 1: Pt will complete pharyngeal strengthening ex's with min cues. Goal 2: Pt will participate in v-stim tx for improved airway protection and pharyngeal strengthening. Long-term Goals: 21 days (by 2/3/20)  Goal 1: Pt will tolerate a PO diet without clinical s/s aspiration. Goal 2: Pt/spouse will demonstrate understanding of dysphagia, aspiration, and swallow ex's with 90% acc. Dysphagia Goals: The patient will tolerate instrumental swallowing procedure; The patient will tolerate honey-thick liquids without signs and symptoms of aspiration 10/10 via cup. ;The patient will tolerate puree foods 10/10. General  Chart Reviewed: Yes  Comments: Pt is a 71 y/o male admitted to [facility] from 1/7/20-1/14/20. Admit to ARU 1/14/20.  PMhx includes recurrent PNA, spinal CA T9-T12 with chemo/radiation, closed head injury, scleroderma, and dysphagia. Pt lives with his spouse. MBS Jan 2019 at [facility] recommended NPO and neuro consult. Per spouse, pt had neuro consults and PD and ALS were ruled out. Most recent MBS was in the summer of 2019 at pt's house; soft foods were recommended per spouse report. Pt recently worked with OP SLP from 10/16/19-11/6/19 for dysphagia. Tx focused on vital stim, oropharyngeal ex's, and trials of ice chips, puree, and honey-thick liquids; NPO status was recommended. Subjective  Subjective: Pt pleasant and agreeable. Pt lethargic. Behavior/Cognition: Cooperative;Pleasant mood; Lethargic;Requires cueing  Respiratory Status: Room air  Follows Directions: Simple  Dentition: Adequate(own dentition)  Patient Positioning: Upright in chair  Baseline Vocal Quality: Wet  Volitional Cough: Wet  Volitional Swallow: Delayed  Consistencies Administered: Ice Chips;Dysphagia Pureed (Dysphagia I)    Vision/Hearing  Vision  Vision: Impaired  Vision Exceptions: Wears glasses at all times  Hearing  Hearing: Exceptions to [facility]  Hearing Exceptions: Bilateral hearing aid    Oral Motor Deficits  Oral/Motor  Oral Motor: Exceptions to [facility]  Labial Strength: Reduced  Lingual Strength: Reduced    Oral Phase Dysfunction  Oral Phase  Oral Phase: Exceptions  Oral Phase Dysfunction  Decreased Anterior to Posterior Transit: Puree; Ice chips  Suspected Premature Bolus Loss: Ice chips     Indicators of Pharyngeal Phase Dysfunction   Pharyngeal Phase  Pharyngeal Phase: Exceptions  Indicators of Pharyngeal Phase Dysfunction  Delayed Swallow: All  Decreased Laryngeal Elevation: All  Wet Vocal Quality: All  Throat Clearing - Delayed: All  Cough - Delayed:  All    Prognosis  Prognosis  Prognosis for safe diet advancement: guarded  Barriers to reach goals: age;severity of dysphagia  Barriers/Prognosis Comment: pre-existing dysphagia  Individuals consulted  Consulted and agree with results and recommendations: Patient; Family member  Family member consulted: spouse    Education  Patient Education: Educated pt/spouse on role of SLP, POC, and goals.    Patient Education Response: Verbalizes understanding;Needs reinforcement  Safety Devices in place: Yes          Therapy Time  SLP Individual Minutes  Time In: 1000  Time Out: 9452  Minutes: [address] [place], [place]  1/15/2020 3:35 PM

## 2020-01-15 NOTE — PROGRESS NOTES
Patient arrived to unit via stretcher at time noted. Oriented to room, call light, unit routines, and other. All questions have been answered at this time. Fall/safety education provided. Call light and belongings are in reach and fall precautions remain in place. Assessment to follow.

## 2020-01-15 NOTE — PROGRESS NOTES
Speech Language Pathology  Facility/Department: [facility] ARU  Initial Speech/Language/Cognitive Assessment    NAME: Cynthia Alex  : 1940   MRN: [id number]  ADMISSION DATE: 2020  ADMITTING DIAGNOSIS: has Medication monitoring encounter; Age-related osteoporosis without current pathological fracture; Thyroid disease; Abnormal leg movement; Hyperlipidemia; Pneumonia; Febrile illness; Senile osteoporosis; and Critical illness myopathy on their problem list.  DATE ONSET: 20    Date of Eval: 1/15/2020   Evaluating Therapist: CHRISTINE Garcia    RECENT RESULTS  CT OF HEAD/MRI: Head CT 20: no acute changes  Primary Complaint: Pt with no complaints    Pain:  None reported    Assessment:   Cognitive Diagnosis: suspected moderate cognitive deficits, to be further assessed  Speech Diagnosis: moderate dysarthria  Communication Diagnosis: mild-moderate     Pt is a 71 y/o male admitted to [facility] from 20-20. Admit to ARU 20. PMhx includes recurrent PNA, spinal CA T9-T12 with chemo/radiation, closed head injury, scleroderma, and dysphagia. Pt lives with his spouse. He receives assist with ADL's and I ADL's. Pt has had cognitive tx before per spouse report. Spouse interested in continued cognitive tx. Limited cognitive-linguistic assessment able to be completed this date d/t time constraints and pt lethargy. He demonstrated with grossly intact language skills. No overt aphasia. He was able to answer 5/5 complex y/n questions and follow 1 step commands with min cues for sustained attention and some repetition needed. He was able to name common objects in room. He was oriented x 3 to person, place \"hospital,\"mo, and year. Poor safety awareness and insight into deficits. Pt presented with a moderate dysarthria characterized by imprecise articulation and slurred speech. 70% speech intelligibility at the short phrase level.  Wet vocal quality/poor secretion management also a barrier to speech intelligibility at times. ST to follow for further cognitive-linguistic assessment and speech and cognitive tx for optimal safety, independence, and communication ability. Recommendations:  Requires SLP Intervention: Yes  Duration/Frequency of Treatment: 5x/wk  D/C Recommendations: To be determined;24 hour supervision/assistance     Plan:   Goals:  Short-term Goals  Timeframe for Short-term Goals: 10 days (by 1/23/20)  Goal 1: Pt will participate in further assessment of cognitive-linguistic skills. Goal 2: Pt will increase safety awareness within current environment with min cues. Goal 3: Pt will demonstrate insight into deficits with min cues. Goal 4: Pt will recall new info per 10 min delay with min cues. Goal 5: Pt will utilize compensatory speech strategies with min cues and 90% speech intelligibility. Long-term Goals  Timeframe for Long-term Goals: 21 days (2/3/20)  Goal 1: Pt will improve cognitive-linguisitc skills to PLOF for optimal safety and reduced caregiver burden. Goal 2: Pt will improve speech intelligibility for optimal communication effectiveness and quality of life. Patient/family involved in developing goals and treatment plan: yes    Subjective:  General  Chart Reviewed: Yes  Additional Pertinent Hx: Pt lives with his spouse. He enjoys crsossword puzzles and helps fold laundry. Spouse reports some baseline memory deficits. Pt is a retired  for [employer]. [place]. Family / Caregiver Present: Yes  Subjective  Subjective: Pt was pleasant and agreeable to the evaluation. He was lethargic and required cues for alertness.    Social/Functional History  Lives With: Spouse  ADL Assistance: Needs assistance  Homemaking Assistance: Needs assistance  Ambulation Assistance: Needs assistance(RW)  Active : No  Vision  Vision: Impaired  Vision Exceptions: Wears glasses at all times  Hearing  Hearing: Exceptions to [facility]  Hearing Exceptions: Bilateral hearing aid Co-treatment   Time In 1045         Time Out 1100         Minutes 450 AUTUMN Chen [id number] [id number]  Speech-Language Pathologist  1/15/20

## 2020-01-15 NOTE — PROGRESS NOTES
Occupational Therapy   Occupational Therapy Initial Assessment and Treatment Note  Date: 1/15/2020   Patient Name: Patito Randhawa  MRN: [id number]     : 1940    Date of Service: 1/15/2020    Discharge Recommendations:  24 hour supervision or assist, Home with Home health OT  OT Equipment Recommendations  Other: Continue to assess, anticipate none    Assessment   Performance deficits / Impairments: Decreased functional mobility ; Decreased safe awareness;Decreased ADL status; Decreased endurance;Decreased posture;Decreased coordination;Decreased balance  Assessment: Pt pleasant and motivated to participate with therapy this date. Per pt and chart review, pt has assistance from wife for functional mobility/transfers, BADL as needed and almost all IADL. Pt required Max A for functional transfers at time of eval, and DEP for LE ADL/toileting, with ataxic movement noted throughout session. Continue to assess cognition, as pt required cues for safety/sequencing/initiation during session, but pt is also [facility] and dysarthric. Prognosis: Good;Fair  Decision Making: High Complexity  OT Education: OT Role;Plan of Care;ADL Adaptive Strategies;Transfer Training;Orientation; Energy Conservation  Patient Education: expectations for ARU  Barriers to Learning: memory   REQUIRES OT FOLLOW UP: Yes  Activity Tolerance  Activity Tolerance: Patient limited by fatigue  Safety Devices  Safety Devices in place: Yes  Type of devices: Left in chair;Chair alarm in place;Call light within reach;Nurse notified;Gait belt           Patient Diagnosis(es): There were no encounter diagnoses.      has a past medical history of Abnormal leg movement, Aspiration pneumonia ([address] Ataxia, BPH (benign prostatic hyperplasia), Cancer of spine ([address] Carotid stenosis, left, Duodenal ulcer, Encephalopathy, Hyperlipidemia, Hypothyroid, Leukocytosis, Osteopenia, Osteoporosis, Subclavian arterial stenosis ([address] Testosterone deficiency, and Thyroid disease. has a past surgical history that includes Spine surgery (tumor removal); Cataract removal with implant (Left, 8/20/2015); Colonoscopy; Cataract removal with implant (Right, 9-); and gastrostomy (01/17/2019). Restrictions  Restrictions/Precautions  Restrictions/Precautions: General Precautions, Fall Risk  Position Activity Restriction  Other position/activity restrictions: Maintain HOB >30 degrees for feeding.     Subjective   General  Chart Reviewed: Yes  Patient assessed for rehabilitation services?: Yes  Referring Practitioner: Isauro Cook MD  Diagnosis: Critical illness myopathy, Resolving pneumonia, Acute blood loss anemia secondary to GI bleed, Dysphagia   Patient Currently in Pain: Denies  Vital Signs  Patient Currently in Pain: Denies  Social/Functional History  Social/Functional History  Lives With: Spouse  Type of Home: House  Home Layout: Two level, Able to Live on Main level with bedroom/bathroom  Home Access: Stairs to enter with rails  Entrance Stairs - Number of Steps: 7 + 7 OFELIA from garage in basement with stair lift or front door without steps  Bathroom Shower/Tub: Walk-in shower  Bathroom Equipment: Shower chair, Hand-held shower, Grab bars in shower  Home Equipment: [employer], Rolling walker, 4 wheeled walker  Receives Help From: Family  ADL Assistance: Needs assistance(Assit with dressing, showering etc.)  Homemaking Assistance: Needs assistance(Helps fold laundry)  Ambulation Assistance: Needs assistance(with RW and physical assist)  Transfer Assistance: Needs assistance  Active : No  Occupation: Retired  Type of occupation:  for [employer]  Additional Comments: Pt stated he would like to walk up and down the stairs again, but per wife has not done them for approximately 8 months       Objective        Orientation  Overall Orientation Status: Impaired  Orientation Level: Disoriented to place;Oriented to person  Observation/Palpation  Posture:

## 2020-01-16 ENCOUNTER — APPOINTMENT (OUTPATIENT)
Dept: GENERAL RADIOLOGY | Age: 80
DRG: 091 | End: 2020-01-16
Attending: PHYSICAL MEDICINE & REHABILITATION
Payer: MEDICARE

## 2020-01-16 LAB
ANION GAP SERPL CALCULATED.3IONS-SCNC: 9 MMOL/L (ref 3–16)
ANISOCYTOSIS: ABNORMAL
BACTERIA: ABNORMAL /HPF
BANDED NEUTROPHILS RELATIVE PERCENT: 34 % (ref 0–7)
BASOPHILS ABSOLUTE: 0 K/UL (ref 0–0.2)
BASOPHILS RELATIVE PERCENT: 0 %
BILIRUBIN URINE: NEGATIVE
BLOOD, URINE: NEGATIVE
BUN BLDV-MCNC: 33 MG/DL (ref 7–20)
CALCIUM SERPL-MCNC: 9.4 MG/DL (ref 8.3–10.6)
CHLORIDE BLD-SCNC: 102 MMOL/L (ref 99–110)
CLARITY: CLEAR
CO2: 29 MMOL/L (ref 21–32)
COLOR: YELLOW
CREAT SERPL-MCNC: 0.7 MG/DL (ref 0.8–1.3)
EOSINOPHILS ABSOLUTE: 0.3 K/UL (ref 0–0.6)
EOSINOPHILS RELATIVE PERCENT: 1 %
EPITHELIAL CELLS, UA: ABNORMAL /HPF
GFR AFRICAN AMERICAN: >60
GFR NON-AFRICAN AMERICAN: >60
GLUCOSE BLD-MCNC: 138 MG/DL (ref 70–99)
GLUCOSE URINE: NEGATIVE MG/DL
HCT VFR BLD CALC: 43.3 % (ref 40.5–52.5)
HEMOGLOBIN: 14 G/DL (ref 13.5–17.5)
KETONES, URINE: NEGATIVE MG/DL
LEUKOCYTE ESTERASE, URINE: NEGATIVE
LYMPHOCYTES ABSOLUTE: 0.6 K/UL (ref 1–5.1)
LYMPHOCYTES RELATIVE PERCENT: 2 %
MCH RBC QN AUTO: 30.5 PG (ref 26–34)
MCHC RBC AUTO-ENTMCNC: 32.3 G/DL (ref 31–36)
MCV RBC AUTO: 94.4 FL (ref 80–100)
MICROSCOPIC EXAMINATION: ABNORMAL
MONOCYTES ABSOLUTE: 0.3 K/UL (ref 0–1.3)
MONOCYTES RELATIVE PERCENT: 1 %
NEUTROPHILS ABSOLUTE: 29.1 K/UL (ref 1.7–7.7)
NEUTROPHILS RELATIVE PERCENT: 62 %
NITRITE, URINE: NEGATIVE
PDW BLD-RTO: 17.3 % (ref 12.4–15.4)
PH UA: 8.5 (ref 5–8)
PLATELET # BLD: 353 K/UL (ref 135–450)
PLATELET SLIDE REVIEW: ADEQUATE
PMV BLD AUTO: 10.6 FL (ref 5–10.5)
POTASSIUM REFLEX MAGNESIUM: 5.3 MMOL/L (ref 3.5–5.1)
PROTEIN UA: NEGATIVE MG/DL
RBC # BLD: 4.59 M/UL (ref 4.2–5.9)
RBC UA: ABNORMAL /HPF (ref 0–2)
SLIDE REVIEW: ABNORMAL
SODIUM BLD-SCNC: 140 MMOL/L (ref 136–145)
SPECIFIC GRAVITY UA: 1.01 (ref 1–1.03)
URINE TYPE: ABNORMAL
UROBILINOGEN, URINE: 0.2 E.U./DL
WBC # BLD: 30.3 K/UL (ref 4–11)
WBC UA: ABNORMAL /HPF (ref 0–5)

## 2020-01-16 PROCEDURE — 87040 BLOOD CULTURE FOR BACTERIA: CPT

## 2020-01-16 PROCEDURE — 1280000000 HC REHAB R&B

## 2020-01-16 PROCEDURE — 80048 BASIC METABOLIC PNL TOTAL CA: CPT

## 2020-01-16 PROCEDURE — 6360000002 HC RX W HCPCS: Performed by: PHYSICAL MEDICINE & REHABILITATION

## 2020-01-16 PROCEDURE — 6370000000 HC RX 637 (ALT 250 FOR IP): Performed by: PHYSICAL MEDICINE & REHABILITATION

## 2020-01-16 PROCEDURE — 97535 SELF CARE MNGMENT TRAINING: CPT

## 2020-01-16 PROCEDURE — 85025 COMPLETE CBC W/AUTO DIFF WBC: CPT

## 2020-01-16 PROCEDURE — 97110 THERAPEUTIC EXERCISES: CPT

## 2020-01-16 PROCEDURE — 81001 URINALYSIS AUTO W/SCOPE: CPT

## 2020-01-16 PROCEDURE — 92526 ORAL FUNCTION THERAPY: CPT

## 2020-01-16 PROCEDURE — 87086 URINE CULTURE/COLONY COUNT: CPT

## 2020-01-16 PROCEDURE — 36415 COLL VENOUS BLD VENIPUNCTURE: CPT

## 2020-01-16 PROCEDURE — 97530 THERAPEUTIC ACTIVITIES: CPT

## 2020-01-16 PROCEDURE — 97116 GAIT TRAINING THERAPY: CPT

## 2020-01-16 PROCEDURE — 71045 X-RAY EXAM CHEST 1 VIEW: CPT

## 2020-01-16 PROCEDURE — 2580000003 HC RX 258: Performed by: PHYSICAL MEDICINE & REHABILITATION

## 2020-01-16 RX ORDER — SODIUM CHLORIDE 9 MG/ML
INJECTION, SOLUTION INTRAVENOUS CONTINUOUS
Status: DISCONTINUED | OUTPATIENT
Start: 2020-01-16 | End: 2020-01-17

## 2020-01-16 RX ORDER — POTASSIUM CHLORIDE 20MEQ/15ML
20 LIQUID (ML) ORAL 3 TIMES DAILY
Status: DISCONTINUED | OUTPATIENT
Start: 2020-01-16 | End: 2020-01-17

## 2020-01-16 RX ORDER — LORAZEPAM 0.5 MG/1
0.5 TABLET ORAL EVERY 4 HOURS PRN
Status: DISCONTINUED | OUTPATIENT
Start: 2020-01-16 | End: 2020-01-31 | Stop reason: HOSPADM

## 2020-01-16 RX ADMIN — VITAMIN D, TAB 1000IU (100/BT) 1000 UNITS: 25 TAB at 09:52

## 2020-01-16 RX ADMIN — POTASSIUM CHLORIDE 40 MEQ: 20 SOLUTION ORAL at 09:54

## 2020-01-16 RX ADMIN — PIPERACILLIN AND TAZOBACTAM 3.38 G: 3; .375 INJECTION, POWDER, LYOPHILIZED, FOR SOLUTION INTRAVENOUS at 14:38

## 2020-01-16 RX ADMIN — FINASTERIDE 5 MG: 5 TABLET, FILM COATED ORAL at 09:52

## 2020-01-16 RX ADMIN — VANCOMYCIN HYDROCHLORIDE 750 MG: 10 INJECTION, POWDER, LYOPHILIZED, FOR SOLUTION INTRAVENOUS at 18:36

## 2020-01-16 RX ADMIN — LEVOTHYROXINE SODIUM 100 MCG: 100 TABLET ORAL at 06:05

## 2020-01-16 RX ADMIN — PIPERACILLIN AND TAZOBACTAM 3.38 G: 3; .375 INJECTION, POWDER, LYOPHILIZED, FOR SOLUTION INTRAVENOUS at 22:26

## 2020-01-16 RX ADMIN — AMOXICILLIN AND CLAVULANATE POTASSIUM 500 MG: 400; 57 POWDER, FOR SUSPENSION ORAL at 06:05

## 2020-01-16 RX ADMIN — ATORVASTATIN CALCIUM 10 MG: 10 TABLET, FILM COATED ORAL at 22:25

## 2020-01-16 RX ADMIN — DOXAZOSIN 1 MG: 2 TABLET ORAL at 09:52

## 2020-01-16 RX ADMIN — OXYCODONE HYDROCHLORIDE AND ACETAMINOPHEN 500 MG: 500 TABLET ORAL at 09:53

## 2020-01-16 RX ADMIN — SODIUM CHLORIDE: 9 INJECTION, SOLUTION INTRAVENOUS at 13:50

## 2020-01-16 RX ADMIN — LORAZEPAM 0.5 MG: 0.5 TABLET ORAL at 22:25

## 2020-01-16 ASSESSMENT — PAIN SCALES - GENERAL
PAINLEVEL_OUTOF10: 0

## 2020-01-16 NOTE — PROGRESS NOTES
Patient's wife at the bedside and is expressing great concerns about the amount of water flushes that has been ordered. Patient's wife has refused to allow this writer to start the flush as per ordered and has requested that the flush be only 30 ml every 4 hours which according to her is [place] they were doing it at [employer] explained what was on the patient's AVS/BRET and although she had a copy of this in hand as well she states that she does not feel it is correct. She states that she was told by previous providers that she needed to be careful how much water was being placed into his G/J tube due to his history of aspirating. Education provided. Wife states that she will contact his previous dietician and our facility in the AM. Will notify physician.

## 2020-01-16 NOTE — PROGRESS NOTES
(01/17/2019). Restrictions  Restrictions/Precautions  Restrictions/Precautions: General Precautions, Fall Risk  Position Activity Restriction  Other position/activity restrictions: Maintain HOB >30 degrees for feeding. Subjective   General  Chart Reviewed: Yes  Patient assessed for rehabilitation services?: Yes  Family / Caregiver Present: No  Referring Practitioner: Isauro Cook MD  Diagnosis: Critical illness myopathy, Resolving pneumonia, Acute blood loss anemia secondary to GI bleed, Dysphagia   Subjective  Subjective: Patient in bathroom with PCA, agreeable to OT. Vital Signs  Patient Currently in Pain: Denies   Orientation  Orientation  Orientation Level: Disoriented to place;Oriented to person;Disoriented to time;Oriented to situation  Objective    ADL  Feeding: NPO  Grooming: Setup;Verbal cueing; Increased time to complete;Supervision(oral care sitting in WC at sink; shaving)  UE Bathing: Setup;Verbal cueing;Supervision; Increased time to complete(sponge bathing seated in chair)  LE Bathing: Setup;Verbal cueing; Increased time to complete;Maximum assistance(posterior lean in standing with significant extension )  UE Dressing: Moderate assistance(donning shirt)  LE Dressing: Dependent/Total  Toileting: Dependent/Total(Assistance with all aspects of toilet with assistance assistance from PCA for balance in jessica)        Balance  Sitting Balance: Minimal assistance(sitting on toilet)  Standing Balance: Maximum assistance(max to CGA;  Benefited from tactile cues for anterior tilt to pelvis)  Standing Balance  Time: ~1 minute x4  Activity: static standing; transfers; functional mobility  Comment: Extenor tone; posterior lean; limited flexion at knees  Functional Mobility  Functional - Mobility Device: Rolling Walker  Activity: (4ft)  Assist Level: Dependent/Total(max x2 for balance with WC following)  Toilet Transfers  Toilet - Technique: Stand pivot  Equipment Used: Standard toilet  Toilet Transfer: Maximum assistance     Transfers  Sit to stand: Maximum assistance(max-moderate)  Stand to sit: Maximum assistance(max-moderate)            Cognition  Overall Cognitive Status: Exceptions  Following Commands:  Follows one step commands with repetition  Attention Span: Attends with cues to redirect  Safety Judgement: Decreased awareness of need for safety  Problem Solving: Assistance required to identify errors made;Assistance required to implement solutions;Assistance required to generate solutions;Assistance required to correct errors made;Decreased awareness of errors  Initiation: Requires cues for some  Sequencing: Requires cues for some            Plan   Plan  Times per week: 5-7x/week   Plan weeks: 3  Current Treatment Recommendations: Strengthening, Balance Training, Functional Mobility Training, Endurance Training, Neuromuscular Re-education, Patient/Caregiver Education & Training, Equipment Evaluation, Education, & procurement, Self-Care / ADL, Safety Education & Training, Positioning, Gait Training       Goals  Short term goals  Time Frame for Short term goals: 10 days (by 1/23/20)  Short term goal 1: Pt will complete functional transfers with Min A  Short term goal 2: Pt will complete LE dressing with Mod A   Short term goal 3: Pt will perform 2-3 minutes dynamic standing activity with Mod A  Long term goals  Time Frame for Long term goals : 21 days (by 2/3/20)  Long term goal 1: Pt will complete functional transfers with SBA  Long term goal 2: Pt will complete LE dressing with Min A   Long term goal 3: Pt will perform toileting with Mod A   Patient Goals   Patient goals : \"to go home\"       Therapy Time   Individual Concurrent Group Co-treatment   Time In 0800     0900   Time Out 0900     0930   Minutes 60     30   Timed Code Treatment Minutes: [address], OTR/L

## 2020-01-16 NOTE — PROGRESS NOTES
Testosterone deficiency, and Thyroid disease. has a past surgical history that includes Spine surgery (tumor removal); Cataract removal with implant (Left, 8/20/2015); Colonoscopy; Cataract removal with implant (Right, 9-); and gastrostomy (01/17/2019). Restrictions  Restrictions/Precautions  Restrictions/Precautions: General Precautions, Fall Risk  Position Activity Restriction  Other position/activity restrictions: Maintain HOB >30 degrees for feeding. Subjective   General  Chart Reviewed: Yes  Response To Previous Treatment: Patient reporting fatigue but able to participate. Family / Caregiver Present: No  Referring Practitioner: Dr. Ignacia Reyes: denies pain  General Comment  Comments: flat affect throughout  Pain Screening  Patient Currently in Pain: No  Vital Signs  Patient Currently in Pain: No       Orientation  Orientation  Overall Orientation Status: Within Functional Limits  Objective      Transfers  Sit to Stand: Maximum Assistance  Stand to sit: Maximum Assistance  Stand Pivot Transfers: Maximum Assistance  Comment: sit to stand from w/c to Rw x 4 reps with max A and second therapist providing tactile cues to trunk/hips for improved posture. stand pivot from w/c <> chair with no AD, max A x 1, min A x1 and max cues. pt demo's difficulty extending trunk/hips, decreased BLE proprioception during transfers. Ambulation  Ambulation?: Yes  Ambulation 1  Surface: level tile  Device: Rolling Walker  Other Apparatus: Wheelchair follow  Assistance: Dependent/Total  Quality of Gait: Pt demos large step length on the left, exaggerated forward trunk flexion, and narrow ADAM. Pt requires cues for decreasing step length, decreasing sabine for safety, improving posture, and RW management. Gait Deviations: Decreased step height;Decreased head and trunk rotation  Distance: 12 ft   Comments: w/c follow for increased safety, max A of 1, min-mod A x 1 for gait balance. 1: Pt will perform supine<>sit with CGA. Short term goal 2: Pt will perform sit<>stand with LRAD and Min A x1. Short term goal 3: Pt will ambulate 50 feet with LRAD and Mod A x1. Short term goal 4: Pt will participate in 12-15 reps of BLE ther ex to increase strength and balance. Long term goals  Time Frame for Long term goals : 3 weeks, 21 days, 2/5/20  Long term goal 1: Pt will perform supine<>sit with SBA. Long term goal 2: Pt will perform sit<>stand with LRAD and SBA. Long term goal 3: Pt will ambulate 75 feet with LRAD and CGA. Long term goal 4: Pt will propel the w/c 500 feet with supervision including 2 turns to the right and left. Patient Goals   Patient goals : To go home. Plan    Plan  Times per week: 5 out of 7 days  Times per day: Daily  Plan weeks: Tentatively 3 weeks, until 2/5  Current Treatment Recommendations: Strengthening, ROM, Balance Training, Functional Mobility Training, Transfer Training, ADL/Self-care Training, Cognitive/Perceptual Training, Endurance Training, Stair training, Gait Training, Wheelchair Mobility Training, Neuromuscular Re-education, Cognitive Reorientation, Patient/Caregiver Education & Training, Safety Education & Training, Home Exercise Program, Equipment Evaluation, Education, & procurement, Positioning  Safety Devices  Type of devices:  All fall risk precautions in place, Call light within reach, Chair alarm in place, Gait belt, Patient at risk for falls, Left in chair  Restraints  Initially in place: No     Therapy Time   Individual Concurrent Group Co-treatment   Time In       0900   Time Out       0930   Minutes       30   Timed Code Treatment Minutes: 30 Minutes    Second Session Therapy Time:   Individual Concurrent Group Co-treatment   Time In 1300        Time Out 1400        Minutes 60          Timed Code Treatment Minutes:  60    Total Treatment Minutes:  90      Antonina Massey, PT    [place], PT, DPT (second session only)

## 2020-01-16 NOTE — PROGRESS NOTES
Patient awake majority of the night. Patient continually scooting down in the bed throughout the night putting his dead well below the necessary 30 degree height as well as disconnecting himself from his tube feed. All needs have been met. RN continues to provide education but due to cognitive status the patient does not seem to be retaining the information provided. Will continue to monitor and educate.

## 2020-01-16 NOTE — PROGRESS NOTES
Medical hold remainder of session (60 total minutes scheduled)    Pain N/a; pt did not c/o pain, shook head 'no' when asked by wife. Pain Intervention [] RN notified  [] Repositioned  [] Intervention offered and patient declined  [x] N/A  [] Other: [] RN notified  [] Repositioned  [] Intervention offered and patient declined  [] N/A  [] Other:   Subjective     Pt seen upright in bed, eyes closed. Pt's wife at bedside. Pt demonstrated reduced RAFIQ, not appropriate for f/u at this time. Pt did not respond to SLP asking pt his first name, recall of BSE completed yesterday, etc.  Abbreviated session spent on dysphagia education with pt's wife. MD aware. Objective:  Goals     Short-term Goals  Timeframe for Short-term Goals: 10 days (by 1/23/20)    Dysphagia goals:  Goal 1: Pt will complete pharyngeal strengthening ex's with min cues. Did not directly target, however, SLP reviewed importance of continued dysphagia tx with pt's wife. SLP also emphasized importance of stringent oral care with simultaneous suctioning (*d/t pt's high aspiration risk). Goal 2: Pt will participate in v-stim tx for improved airway protection and pharyngeal strengthening. Did not directly target, however, SLP discussed continued dysphagia tx during ARU stay with use of NMES as able. Goal 3: The patient will tolerate instrumental swallowing procedure. Will complete instrumental swallow study in the future as clinically indicated. Goal 4: The patient will tolerate honey-thick liquids without signs and symptoms of aspiration 10/10 via cup. Did not directly target    Goal 5: The patient will tolerate puree foods 10/10. Did not directly target    Short-term Goals  Timeframe for Short-term Goals: 10 days (by 1/23/20)    Cognitive-Linguistic goals:  Goal 1: Pt will participate in further assessment of cognitive-linguistic skills.     Did not directly target    Goal 2: Pt will increase safety awareness within

## 2020-01-16 NOTE — PROGRESS NOTES
Chadwick Leggett  1/16/2020  [id number]    Chief Complaint: Critical illness myopathy    Subjective:   Occasionally agitated overnight; resting in bed this morning. No new c/o. ROS: No n/v, cp, sob, f/c  Objective:  Patient Vitals for the past 24 hrs:   BP Temp Temp src Pulse Resp SpO2   01/16/20 0945 114/69 97.7 °F (36.5 °C) Oral 103 16 95 %   01/15/20 2246 129/61 98 °F (36.7 °C) Oral 74 18 97 %     Gen: No distress, pleasant. HEENT: Normocephalic, atraumatic. CV: Regular rate and rhythm. Resp: No respiratory distress. Abd: Soft, nontender   Ext: No edema. Neuro: Alert, oriented, appropriately interactive. Wt Readings from Last 3 Encounters:   01/14/20 125 lb 14.1 oz (57.1 kg)   11/01/19 132 lb (59.9 kg)   01/07/19 133 lb 1 oz (60.4 kg)       Laboratory data:   Lab Results   Component Value Date    WBC 10.2 01/15/2020    HGB 13.2 (L) 01/15/2020    HCT 40.1 (L) 01/15/2020    MCV 95.2 01/15/2020     01/15/2020       Lab Results   Component Value Date     01/15/2020    K 3.3 01/15/2020     01/15/2020    CO2 31 01/15/2020    BUN 36 01/15/2020    CREATININE 0.7 01/15/2020    GLUCOSE 128 01/15/2020    CALCIUM 8.5 01/15/2020        Therapy progress:  PT  Position Activity Restriction  Other position/activity restrictions: Maintain HOB >30 degrees for feeding. Objective     Sit to Stand: Maximum Assistance(Requires cues for bringing hip anteriorly, straightening knees, and pulling shoulders back. Pt intially stands with a posterior lean and requires cues and assist to lean forward into neutral.)  Stand to sit: Maximum Assistance  Bed to Chair: Maximum assistance(with RW and constant VC's. )  Device: Rolling Walker  Assistance: Maximum assistance  Distance: 20 feet + 35 feet  OT  PT Equipment Recommendations  Equipment Needed: No  Other: Pt has w/c, RW, shower chair, and stair lift  Toilet - Technique: Stand pivot  Equipment Used: Standard toilet  Assessment        SLP  Current Diet : NPO Diet Solids Recommendation: NPO  Liquid Consistency Recommendation: NPO    Body mass index is 20.95 kg/m². Rehabilitation Diagnosis:  Neurologic, 3.8, Neuromuscular Disorders, e.g. Critical Illness Myopathy, Other Myopathy     Assessment and Plan:  Critical illness myopathy. Diffusely weak. PT/OT. Nutritional support; dietary consulted.      Resolving pneumonia. Complete course of abx. Pulmonary toilet. Mobilization.      Acute blood loss anemia secondary to GI bleed. Outpatient evaluation by GI in 1 month. Consulted pharmacy to obtain a PPI in liquid form to be given per tube.        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 SLP consulted. Hypokalemia. Replace.     Bowels: Schedule colace + senna. Follow bowel movements. Enema or suppository if needed.      Bladder: Check PVR x 3. [address] if PVR > 200ml or if any volume is > 500 ml.      Sleep: Trazodone provided prn. Tony Harmon MD 1/16/2020, 10:11 AM

## 2020-01-17 LAB
A/G RATIO: 1.1 (ref 1.1–2.2)
ALBUMIN SERPL-MCNC: 3.1 G/DL (ref 3.4–5)
ALP BLD-CCNC: 75 U/L (ref 40–129)
ALT SERPL-CCNC: 26 U/L (ref 10–40)
ANION GAP SERPL CALCULATED.3IONS-SCNC: 9 MMOL/L (ref 3–16)
ANISOCYTOSIS: ABNORMAL
AST SERPL-CCNC: 27 U/L (ref 15–37)
BANDED NEUTROPHILS RELATIVE PERCENT: 24 % (ref 0–7)
BASOPHILS ABSOLUTE: 0.1 K/UL (ref 0–0.2)
BASOPHILS RELATIVE PERCENT: 1 %
BILIRUB SERPL-MCNC: 0.4 MG/DL (ref 0–1)
BUN BLDV-MCNC: 26 MG/DL (ref 7–20)
CALCIUM SERPL-MCNC: 8.3 MG/DL (ref 8.3–10.6)
CHLORIDE BLD-SCNC: 107 MMOL/L (ref 99–110)
CO2: 27 MMOL/L (ref 21–32)
CREAT SERPL-MCNC: 0.8 MG/DL (ref 0.8–1.3)
EOSINOPHILS ABSOLUTE: 1 K/UL (ref 0–0.6)
EOSINOPHILS RELATIVE PERCENT: 7 %
GFR AFRICAN AMERICAN: >60
GFR NON-AFRICAN AMERICAN: >60
GLOBULIN: 2.9 G/DL
GLUCOSE BLD-MCNC: 91 MG/DL (ref 70–99)
HCT VFR BLD CALC: 37.3 % (ref 40.5–52.5)
HEMOGLOBIN: 12.2 G/DL (ref 13.5–17.5)
LACTIC ACID: 1.2 MMOL/L (ref 0.4–2)
LYMPHOCYTES ABSOLUTE: 0.7 K/UL (ref 1–5.1)
LYMPHOCYTES RELATIVE PERCENT: 5 %
MCH RBC QN AUTO: 31.1 PG (ref 26–34)
MCHC RBC AUTO-ENTMCNC: 32.9 G/DL (ref 31–36)
MCV RBC AUTO: 94.7 FL (ref 80–100)
METAMYELOCYTES RELATIVE PERCENT: 2 %
MONOCYTES ABSOLUTE: 0 K/UL (ref 0–1.3)
MONOCYTES RELATIVE PERCENT: 0 %
NEUTROPHILS ABSOLUTE: 12 K/UL (ref 1.7–7.7)
NEUTROPHILS RELATIVE PERCENT: 61 %
PDW BLD-RTO: 17.6 % (ref 12.4–15.4)
PLATELET # BLD: 255 K/UL (ref 135–450)
PLATELET SLIDE REVIEW: ADEQUATE
PMV BLD AUTO: 10.7 FL (ref 5–10.5)
POTASSIUM REFLEX MAGNESIUM: 4.4 MMOL/L (ref 3.5–5.1)
RBC # BLD: 3.93 M/UL (ref 4.2–5.9)
SLIDE REVIEW: ABNORMAL
SODIUM BLD-SCNC: 143 MMOL/L (ref 136–145)
TOTAL PROTEIN: 6 G/DL (ref 6.4–8.2)
VANCOMYCIN TROUGH: 8.3 UG/ML (ref 10–20)
WBC # BLD: 13.8 K/UL (ref 4–11)

## 2020-01-17 PROCEDURE — 94640 AIRWAY INHALATION TREATMENT: CPT

## 2020-01-17 PROCEDURE — 6370000000 HC RX 637 (ALT 250 FOR IP): Performed by: PHYSICAL MEDICINE & REHABILITATION

## 2020-01-17 PROCEDURE — 80202 ASSAY OF VANCOMYCIN: CPT

## 2020-01-17 PROCEDURE — 97535 SELF CARE MNGMENT TRAINING: CPT

## 2020-01-17 PROCEDURE — 97112 NEUROMUSCULAR REEDUCATION: CPT

## 2020-01-17 PROCEDURE — 97530 THERAPEUTIC ACTIVITIES: CPT

## 2020-01-17 PROCEDURE — 97130 THER IVNTJ EA ADDL 15 MIN: CPT

## 2020-01-17 PROCEDURE — 92526 ORAL FUNCTION THERAPY: CPT

## 2020-01-17 PROCEDURE — 97129 THER IVNTJ 1ST 15 MIN: CPT

## 2020-01-17 PROCEDURE — 1280000000 HC REHAB R&B

## 2020-01-17 PROCEDURE — 80053 COMPREHEN METABOLIC PANEL: CPT

## 2020-01-17 PROCEDURE — 36415 COLL VENOUS BLD VENIPUNCTURE: CPT

## 2020-01-17 PROCEDURE — 97542 WHEELCHAIR MNGMENT TRAINING: CPT

## 2020-01-17 PROCEDURE — 85025 COMPLETE CBC W/AUTO DIFF WBC: CPT

## 2020-01-17 PROCEDURE — 97110 THERAPEUTIC EXERCISES: CPT

## 2020-01-17 PROCEDURE — 6360000002 HC RX W HCPCS: Performed by: PHYSICAL MEDICINE & REHABILITATION

## 2020-01-17 PROCEDURE — 83605 ASSAY OF LACTIC ACID: CPT

## 2020-01-17 PROCEDURE — 2580000003 HC RX 258: Performed by: PHYSICAL MEDICINE & REHABILITATION

## 2020-01-17 RX ADMIN — PIPERACILLIN AND TAZOBACTAM 3.38 G: 3; .375 INJECTION, POWDER, LYOPHILIZED, FOR SOLUTION INTRAVENOUS at 22:03

## 2020-01-17 RX ADMIN — LORAZEPAM 0.5 MG: 0.5 TABLET ORAL at 20:28

## 2020-01-17 RX ADMIN — LANSOPRAZOLE 15 MG: KIT at 01:40

## 2020-01-17 RX ADMIN — VITAMIN D, TAB 1000IU (100/BT) 1000 UNITS: 25 TAB at 09:17

## 2020-01-17 RX ADMIN — LANSOPRAZOLE 15 MG: KIT at 09:23

## 2020-01-17 RX ADMIN — IPRATROPIUM BROMIDE AND ALBUTEROL SULFATE 1 AMPULE: .5; 3 SOLUTION RESPIRATORY (INHALATION) at 00:28

## 2020-01-17 RX ADMIN — PIPERACILLIN AND TAZOBACTAM 3.38 G: 3; .375 INJECTION, POWDER, LYOPHILIZED, FOR SOLUTION INTRAVENOUS at 13:48

## 2020-01-17 RX ADMIN — LEVOTHYROXINE SODIUM 100 MCG: 100 TABLET ORAL at 06:23

## 2020-01-17 RX ADMIN — PIPERACILLIN AND TAZOBACTAM 3.38 G: 3; .375 INJECTION, POWDER, LYOPHILIZED, FOR SOLUTION INTRAVENOUS at 06:39

## 2020-01-17 RX ADMIN — ATORVASTATIN CALCIUM 10 MG: 10 TABLET, FILM COATED ORAL at 20:28

## 2020-01-17 RX ADMIN — DOXAZOSIN 1 MG: 2 TABLET ORAL at 09:17

## 2020-01-17 RX ADMIN — FINASTERIDE 5 MG: 5 TABLET, FILM COATED ORAL at 09:17

## 2020-01-17 RX ADMIN — VANCOMYCIN HYDROCHLORIDE 750 MG: 10 INJECTION, POWDER, LYOPHILIZED, FOR SOLUTION INTRAVENOUS at 18:16

## 2020-01-17 RX ADMIN — OXYCODONE HYDROCHLORIDE AND ACETAMINOPHEN 500 MG: 500 TABLET ORAL at 09:17

## 2020-01-17 RX ADMIN — LANSOPRAZOLE 15 MG: KIT at 20:28

## 2020-01-17 ASSESSMENT — PAIN SCALES - GENERAL
PAINLEVEL_OUTOF10: 0

## 2020-01-17 NOTE — PLAN OF CARE
At risk for skin breakdown, see Familia scale. Unable to repositin self in bed. Turn  and reposition every 2 hours. Heels elevated off bed. Protective barrier placed as needed. Patient kept clean and dry. Pillows used for positioning. Will continue to monitor for skin breakdown.

## 2020-01-17 NOTE — PROGRESS NOTES
Occupational Therapy  Facility/Department: [facility] ARU  Daily Treatment Note  NAME: Nohemi Razo  : 1940  MRN: [id number]    Date of Service: 2020    Discharge Recommendations:  24 hour supervision or assist, Home with Home health OT  OT Equipment Recommendations  Other: Continue to assess, anticipate none    Assessment   Performance deficits / Impairments: Decreased functional mobility ; Decreased safe awareness;Decreased ADL status; Decreased endurance;Decreased posture;Decreased coordination;Decreased balance  Assessment: Pt remains pleasant however poor tolerance this session with PT to address functional transfers, balance, and ADLs. Wife present-- unable to provide consistent PLOF this date despite multiple questions asked. Pt eyes closed most of session, remains mod-maxA x2 for all sitting, standing, and transfer tasks due to posterior lean, extensor tone and poor cognition with body mechanics cues. CTA DC recs pending progress atthis time. Pt remains appropriate for cotx due to poor activity tolerance, balance, and safety with transfers. Cont OT POC. Second session: Pt in room, sleeping but easily arousable. Still remains with poor attn, sequencing and cognition throughout. Mod-maxA x2 for bed mobility, modA x1 for upright posture EOB with mod cues for hand placement for support. Attempted chest press with 2# medicine ball however pt demo'd decreased balance and adherance to activity despite redirection. MaxA x2 stand pivot EOB to [facility] with mod cues, poor endurance with severe posterior lean/extensor tone limiting safe descent to WC. Pt propelled self 2x10 ft with BLE, mod cues for attn and sequencing. Pt closing eyes throughout needing redirection for arousal with limited success. Cont OT POC. Prognosis: Fair  OT Education: OT Role;Plan of Care;ADL Adaptive Strategies;Transfer Training;Orientation; Energy Conservation  Patient Education: transfer training with breaking up extensor tone; ADL training; energy conservation   Barriers to Learning: memory   REQUIRES OT FOLLOW UP: Yes  Activity Tolerance  Activity Tolerance: Patient limited by fatigue;Treatment limited secondary to decreased cognition  Activity Tolerance: Poor activity tolerance, closing eyes most of session. Multiple prolonged rest breaks throughout simple tranfsers and ADL attempts  Safety Devices  Safety Devices in place: Yes  Type of devices: Left in chair;Chair alarm in place;Call light within reach;Nurse notified;Gait belt;Patient at risk for falls; All fall risk precautions in place  Restraints  Initially in place: No         Patient Diagnosis(es): There were no encounter diagnoses. has a past medical history of Abnormal leg movement, Aspiration pneumonia ([address] Ataxia, BPH (benign prostatic hyperplasia), Cancer of spine ([address] Carotid stenosis, left, Duodenal ulcer, Encephalopathy, Hyperlipidemia, Hypothyroid, Leukocytosis, Osteopenia, Osteoporosis, Subclavian arterial stenosis ([address] Testosterone deficiency, and Thyroid disease. has a past surgical history that includes Spine surgery (tumor removal); Cataract removal with implant (Left, 8/20/2015); Colonoscopy; Cataract removal with implant (Right, 9-); and gastrostomy (01/17/2019). Restrictions  Restrictions/Precautions  Restrictions/Precautions: General Precautions, Fall Risk  Position Activity Restriction  Other position/activity restrictions: Maintain HOB >30 degrees for feeding. Subjective   General  Chart Reviewed: Yes  Patient assessed for rehabilitation services?: Yes  Family / Caregiver Present: Yes(wife)  Referring Practitioner: Paris Kan MD  Diagnosis: Critical illness myopathy, Resolving pneumonia, Acute blood loss anemia secondary to GI bleed, Dysphagia   Subjective  Subjective: Pt in bed, sleeping.   Seen with cotx for functional transfer training and ADLs due to maxA x2 and poor cognition  Vital Signs  Patient Currently in Pain: Denies   Orientation  Orientation  Overall Orientation Status: Impaired  Orientation Level: Disoriented to place;Oriented to person;Disoriented to time;Oriented to situation  Objective    ADL  Feeding: NPO  UE Dressing: Moderate assistance(modA x1 for balance EOB while OT providing x1A for threading RUE through shirt)  LE Dressing: Dependent/Total(mod-maxA x1 PT support EOB while OT providing assistance for threading LLE, pt able to thread RLE with increased time; assist for donning shoes )  Toileting: Dependent/Total(maxA X1 for standing balance by PT while OT providing assist for 3/3 tasks in stance)  Additional Comments: Pt required cues throughout shower d/t flexing at hips and leaning forward; posterior lean in stance with poor ability to follow commands without increased time and hand over hand assistance        Balance  Sitting Balance: Moderate assistance(modA on toilet, maxA EOB)  Standing Balance: Dependent/Total(maxA x2 for all transfers EOB to WC <> BSC)  Standing Balance  Time: 4x1 minute,   Activity: transfers, ADLs  Comment: Extenor tone; posterior lean; limited flexion at knees  Functional Mobility  Functional - Mobility Device: No device(grab bar and therapy support)  Activity: Other  Assist Level: Dependent/Total  Functional Mobility Comments: maxA x2, poor sequencing and extensor tone throughout; hand over hand for facilitation to scoot forward, grasp grab bar and stand with upright posture  Bed mobility  Rolling to Left: Moderate assistance  Supine to Sit: Moderate assistance;2 Person assistance  Sit to Supine: Unable to assess(in WC upon departure)  Scooting: Moderate assistance(EOB and WC)  Transfers  Stand Step Transfers: Maximum assistance; Moderate assistance;2 Person assistance  Sit to stand: Maximum assistance; Moderate assistance;2 Person assistance  Stand to sit: Moderate assistance;2 Person assistance;Maximum assistance        Coordination  Gross Motor: ataxia noted throughout transfers,

## 2020-01-17 NOTE — PROGRESS NOTES
Genny Campbell  1/17/2020  [id number]    Chief Complaint: Critical illness myopathy    Subjective:   Poor sleep last night; tired this morning. No new c/o. ROS: No n/v, cp, sob, f/c  Objective:  Patient Vitals for the past 24 hrs:   BP Temp Temp src Pulse Resp SpO2   01/17/20 0912 99/65 97.6 °F (36.4 °C) Oral 94 18 97 %   01/17/20 0031 -- -- -- -- -- 95 %   01/16/20 2225 135/72 97.3 °F (36.3 °C) Axillary 102 20 94 %   01/16/20 1830 129/73 97.6 °F (36.4 °C) Oral 92 20 94 %     Gen: No distress, pleasant. HEENT: Normocephalic, atraumatic. CV: Regular rate and rhythm. Resp: No respiratory distress. Abd: Soft, nontender   Ext: No edema. Neuro: Alert, oriented, appropriately interactive. Wt Readings from Last 3 Encounters:   01/14/20 125 lb 14.1 oz (57.1 kg)   11/01/19 132 lb (59.9 kg)   01/07/19 133 lb 1 oz (60.4 kg)       Laboratory data:   Lab Results   Component Value Date    WBC 13.8 (H) 01/17/2020    HGB 12.2 (L) 01/17/2020    HCT 37.3 (L) 01/17/2020    MCV 94.7 01/17/2020     01/17/2020       Lab Results   Component Value Date     01/17/2020    K 4.4 01/17/2020     01/17/2020    CO2 27 01/17/2020    BUN 26 01/17/2020    CREATININE 0.8 01/17/2020    GLUCOSE 91 01/17/2020    CALCIUM 8.3 01/17/2020        Therapy progress:  PT  Position Activity Restriction  Other position/activity restrictions: Maintain HOB >30 degrees for feeding. Objective     Sit to Stand: Maximum Assistance  Stand to sit: Maximum Assistance  Bed to Chair: Maximum assistance(with RW and constant VC's. )  Device: Rolling Walker  Other Apparatus: Wheelchair follow  Assistance: Dependent/Total  Distance: 12 ft   OT  PT Equipment Recommendations  Equipment Needed: No  Other: Pt has w/c, RW, shower chair, and stair lift  Toilet - Technique: Stand pivot  Equipment Used: Standard toilet  Assessment        SLP  Current Diet : NPO     Diet Solids Recommendation: NPO  Liquid Consistency Recommendation: NPO    Body mass

## 2020-01-17 NOTE — PROGRESS NOTES
Nutrition Assessment (Enteral Nutrition)    Type and Reason for Visit: Reassess    Nutrition Recommendations:   1. Continue nocturnal feeds of TwoCal HN (fluid restricted formula) at 70 mL/hr x 10 hr (suggest 8p-6a). 2. Recommend 60 mL H20 q 2 hours or flushes per MD. Increase flush if Na increases greater than 145 mEq/L.  3. Ensure head of bed is 30 - 45 degrees during continuous or bolus gastric feeding. Turn off the feeding if head of bed is lowered less than 30 degrees. 4. Monitor TF intake and tolerance, blood sugar trends, fluid and electrolyte balances, diet advancement, nutrition adequacy, weights, BMs    Nutrition Assessment: Follow up: Pt improving from a nutritional standpoint AEB pt tolerating TF at goal. Pt denied having any nausea, vomiting, pain, diarrhea, or bloating. RN confirmed. Will adjust flush schedule to a lower volume more frequently to help prevent feeding tube clogs. Na 143 mmol/L currently. Will continue current TF regimen. Malnutrition Assessment:  · Malnutrition Status: At risk for malnutrition  · Context: Acute illness or injury  · Findings of the 6 clinical characteristics of malnutrition (Minimum of 2 out of 6 clinical characteristics is required to make the diagnosis of moderate or severe Protein Calorie Malnutrition based on AND/ASPEN Guidelines):  1. Energy Intake-(Nutrition via TF),      2. Weight Loss-Unable to assess,    3. Fat Loss-No significant subcutaneous fat loss,    4. Muscle Loss-Mild muscle mass loss, Temples (temporalis muscle), Clavicles (pectoralis and deltoids)  5. Fluid Accumulation-No significant fluid accumulation,    6.   Strength-Not measured    Nutrition Risk Level: High    Nutrition Needs:  · Estimated Daily Total Kcal: 1504-9112(36-07 kcal/kg)  · Estimated Daily Protein (g): 57-69(1.0-1.2 g/kg)  · Estimated Daily Fluid (ml/day): 1 ml/kcal    Nutrition Diagnosis:   · Problem: Inadequate oral intake  · Etiology: related to Difficulty

## 2020-01-17 NOTE — PLAN OF CARE
Problem: Nutrition  Goal: Optimal nutrition therapy  Outcome: Ongoing  Note:   Nutrition Problem: Inadequate oral intake  Intervention: Food and/or Nutrient Delivery: Modify current Tube Feeding, Continue NPO  Nutritional Goals: Pt will tolerate TF at goal rate during ARU stay

## 2020-01-17 NOTE — PROGRESS NOTES
encounter diagnoses. has a past medical history of Abnormal leg movement, Aspiration pneumonia ([address] Ataxia, BPH (benign prostatic hyperplasia), Cancer of spine ([address] Carotid stenosis, left, Duodenal ulcer, Encephalopathy, Hyperlipidemia, Hypothyroid, Leukocytosis, Osteopenia, Osteoporosis, Subclavian arterial stenosis ([address] Testosterone deficiency, and Thyroid disease. has a past surgical history that includes Spine surgery (tumor removal); Cataract removal with implant (Left, 8/20/2015); Colonoscopy; Cataract removal with implant (Right, 9-); and gastrostomy (01/17/2019). Restrictions  Restrictions/Precautions  Restrictions/Precautions: General Precautions, Fall Risk  Position Activity Restriction  Other position/activity restrictions: Maintain HOB >30 degrees for feeding. Subjective   General  Chart Reviewed: Yes  Response To Previous Treatment: Patient reporting fatigue but able to participate. Referring Practitioner: Dr. Patricio Bryant  Subjective  Subjective: denies pain  General Comment  Comments: flat affect. difficulty keeping eyes open throughout  Pain Screening  Patient Currently in Pain: No  Vital Signs  Patient Currently in Pain: No       Orientation  Orientation  Overall Orientation Status: Within Functional Limits  Cognition      Objective    Am session (co-treat)  Pt difficult to wake at start of session with lights on, HOB elevated maximally. Discussion with pt wife regarding therapy goals. Wife's goal is to be able to transfer pt with 1 person so she can assist him at home upon d/c. Increased time to transition from supine to sit with HOB elevated, step by step VC for sequencing and max A of 1 with max A to scoot to EOB. Once seated EOB, pt initially requires max A to maintain trunk in midline due to L lateral/posteior lean.  Cued to slide hand down LEs to achieve upright trunk position, able to complete with min A and maintains trunk in midline with CGA for approx 1 -1.5 min prior to LOB and requiring max A to return. Pt sat EOB approx 8 -9 min total with GCA-max A for trunk stability while OT assisted with dressing tasks. Sit to stand from EOB to RW with max A and BLE blocked. PT facilitated static standing balance with Bue support and max A while OT assisted with donning pants. Sit pivot from EOB to w/c with mod A of 2 , emphasis on forward trunk flexion and WB through LEs and VC for sequencing. Pt requesting to use commode     Stand pivot from w/c to commode with grab bar and max a of 2 due to increased trunk/hip flexion (difficulty extending resulting in BLEs too far anterior to hips/shoulders and resulting in posterior lean/LOB). Pt utilize commode, voided bladder (pt found to be incontinent of urine at start of session supine in bed). Stand pivot from commode to w/c with grab bar and max A of 2 with cues for sequencing. Education provided to pt for sequencing/ safety with stand pivot transfers with use of RW.    -anterior weight shift, forward trunk flexion, feet in position of mobility, once standing, trunk/hip extension and maintaining appropriate COG to prevent posterior LOB. Pt completed stand pivot from w/c to recliner with RW and max A of 2 (OT facilitating balance/postire, PT facilitating sequence of LEs and posture). Completed x 2 reps total.   Session limited by significant drowsiness, pt often closing eyes and difficulty to maintain attention to task. Ambulation  Ambulation?: No(deferred due to increased drowsinss)  Wheelchair Activities  Left Brakes Level of Assistance: Maximum assistance  Right Brakes Level of Assistance: Maximum assistance  Propulsion 1  Propulsion: Manual  Method: RUE;LUE;LLE;RLE  Level of Assistance: Moderate assistance  Description/ Details: mod A to initiate propulsion and for environment navigation. decreased velocity, mulitple seated rest breaks, varies between use of BUE and BLEs to propel, mod A to complete turn.    Distance: Co-treatment   Time In      0800   Time Out      0900   Minutes      60     Timed Code Treatment Minutes:  60    Total Treatment Minutes:  90     Individual Concurrent Group Co-treatment   Time In 0930         Time Out 1000         Minutes 30         Timed Code Treatment Minutes: 30 Minutes       Wilmar Wang, PT

## 2020-01-18 LAB — URINE CULTURE, ROUTINE: NORMAL

## 2020-01-18 PROCEDURE — 97129 THER IVNTJ 1ST 15 MIN: CPT

## 2020-01-18 PROCEDURE — 2580000003 HC RX 258: Performed by: PHYSICAL MEDICINE & REHABILITATION

## 2020-01-18 PROCEDURE — 1280000000 HC REHAB R&B

## 2020-01-18 PROCEDURE — 97110 THERAPEUTIC EXERCISES: CPT

## 2020-01-18 PROCEDURE — 6360000002 HC RX W HCPCS: Performed by: PHYSICAL MEDICINE & REHABILITATION

## 2020-01-18 PROCEDURE — 97530 THERAPEUTIC ACTIVITIES: CPT

## 2020-01-18 PROCEDURE — 97535 SELF CARE MNGMENT TRAINING: CPT

## 2020-01-18 PROCEDURE — 6370000000 HC RX 637 (ALT 250 FOR IP): Performed by: PHYSICAL MEDICINE & REHABILITATION

## 2020-01-18 PROCEDURE — 97130 THER IVNTJ EA ADDL 15 MIN: CPT

## 2020-01-18 PROCEDURE — 97116 GAIT TRAINING THERAPY: CPT

## 2020-01-18 RX ADMIN — VANCOMYCIN HYDROCHLORIDE 750 MG: 10 INJECTION, POWDER, LYOPHILIZED, FOR SOLUTION INTRAVENOUS at 04:58

## 2020-01-18 RX ADMIN — TRAZODONE HYDROCHLORIDE 50 MG: 50 TABLET ORAL at 19:55

## 2020-01-18 RX ADMIN — PIPERACILLIN AND TAZOBACTAM 3.38 G: 3; .375 INJECTION, POWDER, LYOPHILIZED, FOR SOLUTION INTRAVENOUS at 06:26

## 2020-01-18 RX ADMIN — PIPERACILLIN AND TAZOBACTAM 3.38 G: 3; .375 INJECTION, POWDER, LYOPHILIZED, FOR SOLUTION INTRAVENOUS at 12:21

## 2020-01-18 RX ADMIN — PIPERACILLIN AND TAZOBACTAM 3.38 G: 3; .375 INJECTION, POWDER, LYOPHILIZED, FOR SOLUTION INTRAVENOUS at 22:35

## 2020-01-18 RX ADMIN — OXYCODONE HYDROCHLORIDE AND ACETAMINOPHEN 500 MG: 500 TABLET ORAL at 09:52

## 2020-01-18 RX ADMIN — LANSOPRAZOLE 15 MG: KIT at 10:04

## 2020-01-18 RX ADMIN — FINASTERIDE 5 MG: 5 TABLET, FILM COATED ORAL at 09:52

## 2020-01-18 RX ADMIN — VANCOMYCIN HYDROCHLORIDE 750 MG: 10 INJECTION, POWDER, LYOPHILIZED, FOR SOLUTION INTRAVENOUS at 18:33

## 2020-01-18 RX ADMIN — VITAMIN D, TAB 1000IU (100/BT) 1000 UNITS: 25 TAB at 09:52

## 2020-01-18 RX ADMIN — DOXAZOSIN 1 MG: 2 TABLET ORAL at 09:52

## 2020-01-18 RX ADMIN — ATORVASTATIN CALCIUM 10 MG: 10 TABLET, FILM COATED ORAL at 19:55

## 2020-01-18 RX ADMIN — LEVOTHYROXINE SODIUM 100 MCG: 100 TABLET ORAL at 07:50

## 2020-01-18 RX ADMIN — LANSOPRAZOLE 15 MG: KIT at 19:56

## 2020-01-18 RX ADMIN — LORAZEPAM 0.5 MG: 0.5 TABLET ORAL at 19:55

## 2020-01-18 ASSESSMENT — PAIN SCALES - GENERAL
PAINLEVEL_OUTOF10: 0

## 2020-01-18 NOTE — PLAN OF CARE
Pt remains impulsive at times. Bed/chair alarm in place, avasys in room. Will use call light for assistance. Safety reminders/education continue.

## 2020-01-18 NOTE — PROGRESS NOTES
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]    Precautions: falls  Home situation: Lives at home with wife   Dx: [] Aphasia  [] Dysarthria  [] Apraxia   [x] Oropharyngeal dysphagia [x] Cognitive Impairment  [] Other:   Date of Admit: 1/14/2020  Room #: 1197/2469-44    Current functional status (updated daily):         Pt being seen for : [] Speech/Language Treatment  [x] Dysphagia Treatment [x] Cognitive Treatment  [] Other:  Communication: []WFL  [] Aphasia  [x] Dysarthria  [] Apraxia  [] Pragmatic Impairment [] Non-verbal  [] Hearing Loss  [] Other:   Cognition: [] WFL  [] Mild  [x] Moderate  [] Severe [] Unable to Assess  [x] Other: Needs ongoing assessment  Memory: [] WFL  [] Mild  [x] Moderate  [] Severe [] Unable to Assess  [] Other:  Behavior: [x] Alert  [x] Cooperative  []  Pleasant  [] Confused  [] Agitated  [] Uncooperative  [] Distractible [] Motivated  [] Self-Limiting [] Anxious  [] Other:  Endurance:  [] Adequate for participation in SLP sessions  [x] Reduced overall  [] Lethargic  [] Other:  Safety: [] No concerns at this time  [x] Reduced insight into deficits  [x]  Reduced safety awareness [] Not following call light procedures  [] Unable to Assess  [] Other:    Current Diet Order:DIET TUBE FEED CONTINUOUS/CYCLIC NPO; Fluid Restricted (TwoCal HN); Jejunostomy;  Cyclic; 25; 70; 10 (Suggest 8p-6a)  Swallowing Precautions: NPO; Oral care with simultaneous suction        Date: 1/18/2020      Tx session 1  [id number] Tx session 2  All tx needs met session 1   Total Timed Code Min 60    Total Treatment Minutes 60    Individual Treatment Minutes 60    Group Treatment Minutes 0 0   Co-Treat Minutes 0 0   Variance/Reason:  n/a    Pain No c/o pain; pt shook head 'no' when asked during session     Pain [address] [facility]  Speech-Language Pathologist [id number]

## 2020-01-18 NOTE — PROGRESS NOTES
Diagnosis(es): There were no encounter diagnoses. has a past medical history of Abnormal leg movement, Aspiration pneumonia ([address] Ataxia, BPH (benign prostatic hyperplasia), Cancer of spine ([address] Carotid stenosis, left, Duodenal ulcer, Encephalopathy, Hyperlipidemia, Hypothyroid, Leukocytosis, Osteopenia, Osteoporosis, Subclavian arterial stenosis ([address] Testosterone deficiency, and Thyroid disease. has a past surgical history that includes Spine surgery (tumor removal); Cataract removal with implant (Left, 8/20/2015); Colonoscopy; Cataract removal with implant (Right, 9-); and gastrostomy (01/17/2019). Restrictions  Restrictions/Precautions  Restrictions/Precautions: General Precautions, Fall Risk  Position Activity Restriction  Other position/activity restrictions: Maintain HOB >30 degrees for feeding. Subjective   General  Chart Reviewed: Yes  Response To Previous Treatment: Patient reporting fatigue but able to participate.   Family / Caregiver Present: Yes(wife)  Referring Practitioner: Dr. Jenny Dumont: Pt up in chair, agreeable to therapy  Pain Screening  Patient Currently in Pain: Denies  Vital Signs  Patient Currently in Pain: Denies       Orientation  Orientation  Overall Orientation Status: Within Functional Limits  Cognition      Objective   Bed mobility  Supine to Sit: Unable to assess(pt up in chair beginning of session)  Sit to Supine: Dependent/Total(max A x2)  Scooting: Dependent/Total(max A x2 to [facility])  Transfers  Sit to Stand: 2 Person Assistance(max A x2 progressing to min A x2 at times with RW)  Stand to sit: 2 Person Assistance(max A x2 progressing to min A x2 at times with RW)  Bed to Chair: 2 Person Assistance(mod A x 2 progressing to mod A x1, min A x1 at times with RW)  Ambulation  Ambulation?: Yes  More Ambulation?: Yes  Ambulation 1  Surface: level tile  Device: Rolling Walker  Other Apparatus: Wheelchair follow  Assistance: 2 Person assistance(mod A

## 2020-01-18 NOTE — PLAN OF CARE
Fall precautions in place, bed alarm on, nonskid foot wear applied, bed in lowest position, and call light within reach. Avasys in place for patient safety. Will continue to monitor.

## 2020-01-18 NOTE — PROGRESS NOTES
history that includes Spine surgery (tumor removal); Cataract removal with implant (Left, 8/20/2015); Colonoscopy; Cataract removal with implant (Right, 9-); and gastrostomy (01/17/2019). Restrictions  Restrictions/Precautions  Restrictions/Precautions: General Precautions, Fall Risk  Position Activity Restriction  Other position/activity restrictions: Maintain HOB >30 degrees for feeding. Subjective   General  Chart Reviewed: Yes  Patient assessed for rehabilitation services?: Yes  Family / Caregiver Present: Yes(wife)  Referring Practitioner: Lizett Mcintyre MD  Diagnosis: Critical illness myopathy, Resolving pneumonia, Acute blood loss anemia secondary to GI bleed, Dysphagia   Subjective  Subjective: Pt in chair upon entry. Seen with cotx for functional transfer training and ADLs due to maxA x2 and poor cognition. Vital Signs  Patient Currently in Pain: Denies     Orientation  Orientation  Overall Orientation Status: Impaired  Orientation Level: Oriented to person;Oriented to place; Disoriented to time;Disoriented to situation(oriented to year, but not month/date; Pt re-oriented)     Objective    ADL  Feeding: NPO;Dependent/Total(tube-feeds)  LE Dressing: Dependent/Total(to don pants seated and standing (TA to don plus mod A for standing balance with RW from another) and to don shoes seated)           Balance  Sitting Balance: Moderate assistance(during core strengthing EOB to mod I in w/c)  Standing Balance: Dependent/Total(fluctuating - initially max A x2 with RW with posterior lean, poor proprioception in LLE with hyperextension at knee, poor awareness of errors/deficits, progressing to periods of CGA with RW)  Functional Mobility  Functional Mobility Comments: Pt walked ~3 ft 5x during session to perform stand step t/fs - initially needing mod A x2 progressing to min A x2.  He also walked ~10ft in parallel bars 2x with w/c follow and mod A with occasional min A.  Bed mobility  Sit to complete functional transfers with SBA  Long term goal 2: Pt will complete LE dressing with Min A   Long term goal 3: Pt will perform toileting with Mod A   Patient Goals   Patient goals : \"to go home\"       Therapy Time   Individual Concurrent Group Co-treatment   Time In       1230   Time Out       1330   Minutes       60   Timed Code Treatment Minutes: 61 Minutes       If patient is discharged prior to next treatment session, this note will serve as the discharge summary.   Jorge Tobar, OTR/L #[id number]

## 2020-01-19 LAB — VANCOMYCIN TROUGH: 16.5 UG/ML (ref 10–20)

## 2020-01-19 PROCEDURE — 1280000000 HC REHAB R&B

## 2020-01-19 PROCEDURE — 6370000000 HC RX 637 (ALT 250 FOR IP): Performed by: PHYSICAL MEDICINE & REHABILITATION

## 2020-01-19 PROCEDURE — 80202 ASSAY OF VANCOMYCIN: CPT

## 2020-01-19 PROCEDURE — 2580000003 HC RX 258: Performed by: PHYSICAL MEDICINE & REHABILITATION

## 2020-01-19 PROCEDURE — 6360000002 HC RX W HCPCS: Performed by: PHYSICAL MEDICINE & REHABILITATION

## 2020-01-19 RX ADMIN — LORAZEPAM 0.5 MG: 0.5 TABLET ORAL at 09:26

## 2020-01-19 RX ADMIN — VANCOMYCIN HYDROCHLORIDE 750 MG: 10 INJECTION, POWDER, LYOPHILIZED, FOR SOLUTION INTRAVENOUS at 18:54

## 2020-01-19 RX ADMIN — LANSOPRAZOLE 15 MG: KIT at 22:13

## 2020-01-19 RX ADMIN — PIPERACILLIN AND TAZOBACTAM 3.38 G: 3; .375 INJECTION, POWDER, LYOPHILIZED, FOR SOLUTION INTRAVENOUS at 13:55

## 2020-01-19 RX ADMIN — DOXAZOSIN 1 MG: 2 TABLET ORAL at 09:26

## 2020-01-19 RX ADMIN — VANCOMYCIN HYDROCHLORIDE 750 MG: 10 INJECTION, POWDER, LYOPHILIZED, FOR SOLUTION INTRAVENOUS at 05:03

## 2020-01-19 RX ADMIN — FINASTERIDE 5 MG: 5 TABLET, FILM COATED ORAL at 09:26

## 2020-01-19 RX ADMIN — LANSOPRAZOLE 15 MG: KIT at 09:30

## 2020-01-19 RX ADMIN — ATORVASTATIN CALCIUM 10 MG: 10 TABLET, FILM COATED ORAL at 22:13

## 2020-01-19 RX ADMIN — PEDIATRIC MULTIPLE VITAMINS W/ IRON DROPS 10 MG/ML 5 ML: 10 SOLUTION at 09:30

## 2020-01-19 RX ADMIN — VITAMIN D, TAB 1000IU (100/BT) 1000 UNITS: 25 TAB at 09:27

## 2020-01-19 RX ADMIN — LEVOTHYROXINE SODIUM 100 MCG: 100 TABLET ORAL at 06:25

## 2020-01-19 RX ADMIN — PIPERACILLIN AND TAZOBACTAM 3.38 G: 3; .375 INJECTION, POWDER, LYOPHILIZED, FOR SOLUTION INTRAVENOUS at 06:25

## 2020-01-19 RX ADMIN — OXYCODONE HYDROCHLORIDE AND ACETAMINOPHEN 500 MG: 500 TABLET ORAL at 09:26

## 2020-01-19 RX ADMIN — PIPERACILLIN AND TAZOBACTAM 3.38 G: 3; .375 INJECTION, POWDER, LYOPHILIZED, FOR SOLUTION INTRAVENOUS at 22:12

## 2020-01-19 ASSESSMENT — PAIN SCALES - GENERAL
PAINLEVEL_OUTOF10: 0

## 2020-01-20 LAB
ANION GAP SERPL CALCULATED.3IONS-SCNC: 9 MMOL/L (ref 3–16)
BASOPHILS ABSOLUTE: 0.2 K/UL (ref 0–0.2)
BASOPHILS RELATIVE PERCENT: 1.1 %
BLOOD CULTURE, ROUTINE: NORMAL
BUN BLDV-MCNC: 17 MG/DL (ref 7–20)
CALCIUM SERPL-MCNC: 8 MG/DL (ref 8.3–10.6)
CHLORIDE BLD-SCNC: 99 MMOL/L (ref 99–110)
CO2: 26 MMOL/L (ref 21–32)
CREAT SERPL-MCNC: 0.7 MG/DL (ref 0.8–1.3)
CULTURE, BLOOD 2: NORMAL
EOSINOPHILS ABSOLUTE: 0.6 K/UL (ref 0–0.6)
EOSINOPHILS RELATIVE PERCENT: 3.6 %
GFR AFRICAN AMERICAN: >60
GFR NON-AFRICAN AMERICAN: >60
GLUCOSE BLD-MCNC: 92 MG/DL (ref 70–99)
HCT VFR BLD CALC: 35.3 % (ref 40.5–52.5)
HEMOGLOBIN: 11.5 G/DL (ref 13.5–17.5)
LYMPHOCYTES ABSOLUTE: 0.6 K/UL (ref 1–5.1)
LYMPHOCYTES RELATIVE PERCENT: 3.8 %
MCH RBC QN AUTO: 30.3 PG (ref 26–34)
MCHC RBC AUTO-ENTMCNC: 32.6 G/DL (ref 31–36)
MCV RBC AUTO: 93.1 FL (ref 80–100)
MONOCYTES ABSOLUTE: 1 K/UL (ref 0–1.3)
MONOCYTES RELATIVE PERCENT: 6.3 %
NEUTROPHILS ABSOLUTE: 13.6 K/UL (ref 1.7–7.7)
NEUTROPHILS RELATIVE PERCENT: 85.2 %
PDW BLD-RTO: 17 % (ref 12.4–15.4)
PLATELET # BLD: 223 K/UL (ref 135–450)
PMV BLD AUTO: 11.4 FL (ref 5–10.5)
POTASSIUM REFLEX MAGNESIUM: 4 MMOL/L (ref 3.5–5.1)
RBC # BLD: 3.79 M/UL (ref 4.2–5.9)
SODIUM BLD-SCNC: 134 MMOL/L (ref 136–145)
WBC # BLD: 16 K/UL (ref 4–11)

## 2020-01-20 PROCEDURE — 6370000000 HC RX 637 (ALT 250 FOR IP): Performed by: PHYSICAL MEDICINE & REHABILITATION

## 2020-01-20 PROCEDURE — 97130 THER IVNTJ EA ADDL 15 MIN: CPT

## 2020-01-20 PROCEDURE — 6360000002 HC RX W HCPCS: Performed by: PHYSICAL MEDICINE & REHABILITATION

## 2020-01-20 PROCEDURE — 92526 ORAL FUNCTION THERAPY: CPT

## 2020-01-20 PROCEDURE — 36415 COLL VENOUS BLD VENIPUNCTURE: CPT

## 2020-01-20 PROCEDURE — 97129 THER IVNTJ 1ST 15 MIN: CPT

## 2020-01-20 PROCEDURE — 2580000003 HC RX 258: Performed by: PHYSICAL MEDICINE & REHABILITATION

## 2020-01-20 PROCEDURE — 97535 SELF CARE MNGMENT TRAINING: CPT

## 2020-01-20 PROCEDURE — 97116 GAIT TRAINING THERAPY: CPT

## 2020-01-20 PROCEDURE — 97530 THERAPEUTIC ACTIVITIES: CPT

## 2020-01-20 PROCEDURE — 1280000000 HC REHAB R&B

## 2020-01-20 PROCEDURE — 85025 COMPLETE CBC W/AUTO DIFF WBC: CPT

## 2020-01-20 PROCEDURE — 80048 BASIC METABOLIC PNL TOTAL CA: CPT

## 2020-01-20 PROCEDURE — 97112 NEUROMUSCULAR REEDUCATION: CPT

## 2020-01-20 RX ADMIN — DOXAZOSIN 1 MG: 2 TABLET ORAL at 07:58

## 2020-01-20 RX ADMIN — OXYCODONE HYDROCHLORIDE AND ACETAMINOPHEN 500 MG: 500 TABLET ORAL at 07:58

## 2020-01-20 RX ADMIN — LEVOTHYROXINE SODIUM 100 MCG: 100 TABLET ORAL at 05:33

## 2020-01-20 RX ADMIN — LANSOPRAZOLE 15 MG: KIT at 20:06

## 2020-01-20 RX ADMIN — PIPERACILLIN AND TAZOBACTAM 3.38 G: 3; .375 INJECTION, POWDER, LYOPHILIZED, FOR SOLUTION INTRAVENOUS at 05:33

## 2020-01-20 RX ADMIN — VITAMIN D, TAB 1000IU (100/BT) 1000 UNITS: 25 TAB at 07:58

## 2020-01-20 RX ADMIN — FINASTERIDE 5 MG: 5 TABLET, FILM COATED ORAL at 07:58

## 2020-01-20 RX ADMIN — VANCOMYCIN HYDROCHLORIDE 750 MG: 10 INJECTION, POWDER, LYOPHILIZED, FOR SOLUTION INTRAVENOUS at 08:06

## 2020-01-20 RX ADMIN — LANSOPRAZOLE 15 MG: KIT at 07:57

## 2020-01-20 RX ADMIN — PEDIATRIC MULTIPLE VITAMINS W/ IRON DROPS 10 MG/ML 5 ML: 10 SOLUTION at 07:57

## 2020-01-20 RX ADMIN — ATORVASTATIN CALCIUM 10 MG: 10 TABLET, FILM COATED ORAL at 20:06

## 2020-01-20 ASSESSMENT — PAIN SCALES - GENERAL
PAINLEVEL_OUTOF10: 0

## 2020-01-20 NOTE — PROGRESS NOTES
Physical Therapy  Facility/Department: [facility]  Daily Treatment Note  NAME: Efe Jean Baptiste  : 1940  MRN: [id number]    Date of Service: 2020    Discharge Recommendations:  24 hour supervision or assist, Continue to assess pending progress   PT Equipment Recommendations  Equipment Needed: No  Other: Pt has w/c, RW, shower chair, and stair lift    Assessment   Body structures, Functions, Activity limitations: Decreased functional mobility ; Decreased strength;Decreased endurance;Decreased coordination;Decreased posture;Decreased safe awareness;Decreased high-level IADLs;Decreased balance  Assessment: co-treat with OT for increased safety with functional mobility due to pt's current levels of assist required. pt difficut to wake at start of session, partiicpated minimally throughout. flat affect, pt choosing to answer with head nods/shakes despite therapist asking him to vocalize. Pt incontinent of bowel and bladder at start of session all therapy tasks require increased time, poor carryover of education provided during all the repeated transfers (100% of time reuires cues for sequencing/anterior weight shfit, safe hand placement. ..) conitnues to require 2 person assist for all transfers due to significant posterior LOB. barriers include lack of hip/trunk extension with lack of hip/ankle strategy to correct and maintain balance. unclear if wife will be able to provide appropriate level of assist required to care for pt at home. continue to prgoress mobility as tolerated to decrease pt burden of care. Treatment Diagnosis: Decreased functional mobility  Prognosis: Good  Decision Making: Medium Complexity  PT Education: Goals;PT Role;Plan of Care;Transfer Training;Energy Conservation;General Safety;Orientation;Equipment; Family Education;Gait Training;Functional Mobility Training  Patient Education: Pt verbalizes understanding, will require reinforcement.   REQUIRES PT FOLLOW UP: Yes  Activity Tolerance  Activity Tolerance: Patient limited by fatigue;Patient limited by endurance  Activity Tolerance: pt appears to have self limiting behavior s      Patient Diagnosis(es): There were no encounter diagnoses. has a past medical history of Abnormal leg movement, Aspiration pneumonia ([address] Ataxia, BPH (benign prostatic hyperplasia), Cancer of spine ([address] Carotid stenosis, left, Duodenal ulcer, Encephalopathy, Hyperlipidemia, Hypothyroid, Leukocytosis, Osteopenia, Osteoporosis, Subclavian arterial stenosis ([address] Testosterone deficiency, and Thyroid disease. has a past surgical history that includes Spine surgery (tumor removal); Cataract removal with implant (Left, 8/20/2015); Colonoscopy; Cataract removal with implant (Right, 9-); and gastrostomy (01/17/2019). Restrictions  Restrictions/Precautions  Restrictions/Precautions: General Precautions, Fall Risk  Position Activity Restriction  Other position/activity restrictions: Maintain HOB >30 degrees for feeding. Subjective   General  Chart Reviewed: Yes  Response To Previous Treatment: Patient reporting fatigue but able to participate. Family / Caregiver Present: Yes  Referring Practitioner: Dr. Samantha Mac: found sleeping in bed, difficult to wake. once awake participates minimally throughout session  General Comment  Comments: continues to demo flat affect. answers through head nods/shakes  Pain Screening  Patient Currently in Pain: No  Vital Signs  Patient Currently in Pain: No       Orientation  Orientation  Overall Orientation Status: Within Functional Limits  Cognition      Objective   Bed mobility  Supine to Sit: Moderate assistance;Maximum assistance  Scooting: Maximal assistance  Comment: supine to sit with HOB maximally elevated, increased time and cues for sequencing.    Transfers  Sit to Stand: 2 Person Assistance  Stand to sit: 2 Person Assistance  Stand Pivot Transfers: 2 Person Assistance  Comment: sit ot stand LRAD and Min A x1. Short term goal 3: Pt will ambulate 50 feet with LRAD and Mod A x1. Short term goal 4: Pt will participate in 12-15 reps of BLE ther ex to increase strength and balance. Long term goals  Time Frame for Long term goals : 3 weeks, 21 days, 2/5/20  Long term goal 1: Pt will perform supine<>sit with SBA. Long term goal 2: Pt will perform sit<>stand with LRAD and SBA. Long term goal 3: Pt will ambulate 75 feet with LRAD and CGA. Long term goal 4: Pt will propel the w/c 500 feet with supervision including 2 turns to the right and left. Patient Goals   Patient goals : To go home. Plan    Plan  Times per week: 5 out of 7 days  Times per day: Daily  Plan weeks: Tentatively 3 weeks, until 2/5  Current Treatment Recommendations: Strengthening, ROM, Balance Training, Functional Mobility Training, Transfer Training, ADL/Self-care Training, Cognitive/Perceptual Training, Endurance Training, Stair training, Gait Training, Wheelchair Mobility Training, Neuromuscular Re-education, Cognitive Reorientation, Patient/Caregiver Education & Training, Safety Education & Training, Home Exercise Program, Equipment Evaluation, Education, & procurement, Positioning  Safety Devices  Type of devices:  All fall risk precautions in place, Call light within reach, Gait belt, Patient at risk for falls, Telesitter in use, Chair alarm in place, Left in chair  Restraints  Initially in place: No     Therapy Time   Individual Concurrent Group Co-treatment   Time In       0900   Time Out       1000   Minutes       60   Timed Code Treatment Minutes: 60 Minutes       Britney Sinha PT

## 2020-01-20 NOTE — PROGRESS NOTES
Occupational Therapy  Facility/Department: [facility]  Daily Treatment Note  NAME: Claudette Robinson  : 1940  MRN: [id number]    Date of Service: 2020    Discharge Recommendations:  24 hour supervision or assist, Home with Home health OT  OT Equipment Recommendations  Other: Continue to assess, anticipate none    Assessment   Performance deficits / Impairments: Decreased functional mobility ; Decreased safe awareness;Decreased ADL status; Decreased endurance;Decreased posture;Decreased coordination;Decreased balance  Assessment: Pt continues to be limited by fatigue, sequencing/cognition, ataxia, and poor posterior. Continues to demo increased posterior lean and extensor tone, needing x2A for all transfers. Pt does demo increased balance at times needing only Jacquelyn x2 for 2 tranfers but due to fatigue needing increased rest breaks. Pt is making slow progress towards goals and unsure if wife can support physically at home, will CTA. Cont OT POC. Prognosis: Fair  OT Education: OT Role;Plan of Care;ADL Adaptive Strategies;Transfer Training;Orientation; Energy Conservation  Patient Education: transfer training with breaking up extensor tone; ADL training; energy conservation   Barriers to Learning: memory   REQUIRES OT FOLLOW UP: Yes  Activity Tolerance  Activity Tolerance: Treatment limited secondary to decreased cognition;Patient Tolerated treatment well  Activity Tolerance: Poor activity tolerance, closing eyes most of session. Multiple prolonged rest breaks throughout simple tranfsers and ADL attempts  Safety Devices  Safety Devices in place: Yes  Type of devices: Nurse notified;Gait belt;Patient at risk for falls;Call light within reach; Chair alarm in place; Left in chair  Restraints  Initially in place: No         Patient Diagnosis(es): There were no encounter diagnoses.       has a past medical history of Abnormal leg movement, Aspiration pneumonia ([address] Ataxia, BPH (benign prostatic hyperplasia), 62   Timed Code Treatment Minutes: [address], OTR/L

## 2020-01-20 NOTE — PROGRESS NOTES
Nehemias Chapin  1/20/2020  [id number]    Chief Complaint: Critical illness myopathy    Subjective: All cx ngtd; now off abx. No new c/o. ROS: No n/v, cp, sob, f/c  Objective:  Patient Vitals for the past 24 hrs:   BP Temp Temp src Pulse Resp SpO2   01/20/20 0745 128/73 97.4 °F (36.3 °C) Oral 86 18 97 %   01/19/20 2050 (!) 97/58 97.3 °F (36.3 °C) Oral 80 16 100 %     Gen: No distress, pleasant. HEENT: Normocephalic, atraumatic. CV: Regular rate and rhythm. Resp: No respiratory distress. Abd: Soft, nontender   Ext: No edema. Neuro: Alert, oriented, appropriately interactive. Wt Readings from Last 3 Encounters:   01/19/20 132 lb 4.4 oz (60 kg)   11/01/19 132 lb (59.9 kg)   01/07/19 133 lb 1 oz (60.4 kg)       Laboratory data:   Lab Results   Component Value Date    WBC 13.8 (H) 01/17/2020    HGB 12.2 (L) 01/17/2020    HCT 37.3 (L) 01/17/2020    MCV 94.7 01/17/2020     01/17/2020       Lab Results   Component Value Date     01/17/2020    K 4.4 01/17/2020     01/17/2020    CO2 27 01/17/2020    BUN 26 01/17/2020    CREATININE 0.8 01/17/2020    GLUCOSE 91 01/17/2020    CALCIUM 8.3 01/17/2020        Therapy progress:  PT  Position Activity Restriction  Other position/activity restrictions: Maintain HOB >30 degrees for feeding.   Objective     Sit to Stand: 2 Person Assistance(max A x2 progressing to min A x2 at times with RW)  Stand to sit: 2 Person Assistance(max A x2 progressing to min A x2 at times with RW)  Bed to Chair: 2 Person Assistance(mod A x 2 progressing to mod A x1, min A x1 at times with RW)  Device: Rolling Walker  Other Apparatus: Wheelchair follow  Assistance: 2 Person assistance(mod A x2)  Distance: 1x 5 ft  OT  PT Equipment Recommendations  Equipment Needed: No  Other: Pt has w/c, RW, shower chair, and stair lift  Toilet - Technique: Stand pivot  Equipment Used: Standard toilet  Assessment        SLP  Current Diet : NPO     Diet Solids Recommendation: NPO  Liquid Consistency Recommendation: NPO    Body mass index is 22.01 kg/m². Rehabilitation Diagnosis:  Neurologic, 3.8, Neuromuscular Disorders, e.g. Critical Illness Myopathy, Other Myopathy     Assessment and Plan:  Critical illness myopathy. Diffusely weak. PT/OT. Nutritional support; dietary consulted.      Leukocytosis. Resolved with all cx ngtd. Off abx.     Acute blood loss anemia secondary to GI bleed. Outpatient evaluation by GI in 1 month. 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 SLP consulted.      Bowels: Schedule colace + senna. Follow bowel movements. Enema or suppository if needed.      Bladder: Check PVR x 3. [address] if PVR > 200ml or if any volume is > 500 ml.      Sleep: Trazodone provided prn. Tony Espitia MD 1/20/2020, 10:13 AM

## 2020-01-20 NOTE — PROGRESS NOTES
Intervention [] RN notified  [] Repositioned  [] Intervention offered and patient declined  [x] N/A  [] Other: [] RN notified  [] Repositioned  [] Intervention offered and patient declined  [] N/A  [] Other:   Subjective     Pt seen upright in chair in room. Pt appeared fatigued characterized by frequent eye closure. Pt's son present at bedside. Pt required max cues for adequate participation and RAFIQ. Objective:  Goals     Short-term Goals  Timeframe for Short-term Goals: 10 days (by 1/23/20)    Dysphagia goals:  Goal 1: Pt will complete pharyngeal strengthening ex's with min cues. Pt completed strengthening exercises;  - pressing tongue tip to roof of mouth and hold  - Effortful swallow (4x)  SLP used swap dipped in water to assist with moisture increase in mouth to carry out exercises. - Sustain \"ah\" for   approx. 4 seconds, 3 seconds  - Pt requiring max cues to take a deep breath and begin at the top of the breath.   -Pt observed to have pitch variability.     -Pt required max cues to attend to all tasks. Goal 2: Pt will participate in v-stim tx for improved airway protection and pharyngeal strengthening. Did not target at this time. Obtained verbal order from Dr. Juan Reyes to begin Vitalstim (NMES) therapy. Will ask RN to place order in order to start Vital stim tomorrow. Pt's son in agreement. No evidence of learning observed from pt. Pt's son reported pt was receiving vital stim therapy as an outpatient prior to most recent hospitalization. Goal 3: The patient will tolerate instrumental swallowing procedure. Will complete instrumental swallow study in the future as clinically indicated. Goal 4: The patient will tolerate honey-thick liquids without signs and symptoms of aspiration 10/10 via cup. Did not target    Goal 5: The patient will tolerate puree foods 10/10.     Did not target    Short-term Goals  Timeframe for Short-term Goals: 10 days (by

## 2020-01-21 PROCEDURE — 92526 ORAL FUNCTION THERAPY: CPT

## 2020-01-21 PROCEDURE — 97112 NEUROMUSCULAR REEDUCATION: CPT

## 2020-01-21 PROCEDURE — 97530 THERAPEUTIC ACTIVITIES: CPT

## 2020-01-21 PROCEDURE — 1280000000 HC REHAB R&B

## 2020-01-21 PROCEDURE — 97129 THER IVNTJ 1ST 15 MIN: CPT

## 2020-01-21 PROCEDURE — 97116 GAIT TRAINING THERAPY: CPT

## 2020-01-21 PROCEDURE — 6370000000 HC RX 637 (ALT 250 FOR IP): Performed by: PHYSICAL MEDICINE & REHABILITATION

## 2020-01-21 RX ORDER — DEXTROMETHORPHAN HYDROBROMIDE AND PROMETHAZINE HYDROCHLORIDE 15; 6.25 MG/5ML; MG/5ML
5 SOLUTION ORAL NIGHTLY PRN
Status: DISCONTINUED | OUTPATIENT
Start: 2020-01-21 | End: 2020-01-22 | Stop reason: RX

## 2020-01-21 RX ADMIN — TRAZODONE HYDROCHLORIDE 50 MG: 50 TABLET ORAL at 20:48

## 2020-01-21 RX ADMIN — ATORVASTATIN CALCIUM 10 MG: 10 TABLET, FILM COATED ORAL at 20:48

## 2020-01-21 RX ADMIN — LORAZEPAM 0.5 MG: 0.5 TABLET ORAL at 03:52

## 2020-01-21 RX ADMIN — FINASTERIDE 5 MG: 5 TABLET, FILM COATED ORAL at 08:18

## 2020-01-21 RX ADMIN — LANSOPRAZOLE 15 MG: KIT at 08:17

## 2020-01-21 RX ADMIN — OXYCODONE HYDROCHLORIDE AND ACETAMINOPHEN 500 MG: 500 TABLET ORAL at 08:18

## 2020-01-21 RX ADMIN — LEVOTHYROXINE SODIUM 100 MCG: 100 TABLET ORAL at 05:42

## 2020-01-21 RX ADMIN — LORAZEPAM 0.5 MG: 0.5 TABLET ORAL at 20:48

## 2020-01-21 RX ADMIN — VITAMIN D, TAB 1000IU (100/BT) 1000 UNITS: 25 TAB at 08:18

## 2020-01-21 RX ADMIN — LANSOPRAZOLE 15 MG: KIT at 20:48

## 2020-01-21 RX ADMIN — DOXAZOSIN 1 MG: 2 TABLET ORAL at 08:18

## 2020-01-21 RX ADMIN — PEDIATRIC MULTIPLE VITAMINS W/ IRON DROPS 10 MG/ML 5 ML: 10 SOLUTION at 08:17

## 2020-01-21 ASSESSMENT — PAIN SCALES - GENERAL
PAINLEVEL_OUTOF10: 0

## 2020-01-21 NOTE — PATIENT CARE CONFERENCE
ankle/hip strategy and righting reactions with LOB (moslty posterior). minimal improvement in sitting tolerance/balance. discussion with wife regarding additional assist at home. wife reporting aides come to home \"several times a week in am and pm for dressing/showering\". remainder of day, pt wife pivots pt. educated pt wife with pt's permission that at this time pt is requiring up to 2 person assist for transfers. continue to progress mobility/endurance as able per poc. Occupational therapy observed barriers to dc:    Baseline: Some assist for mobility up to 50' and transfers (unsure of level), needs assist for LE ADL/toileting, has assist for most IADL              Current level: Max A-Total assist for functional transfers, mobility, most UE/LE ADL and toileting              Barriers to DC: balance, safety awareness, involuntary movement              Needs in order to achieve dc home/next level of care: Min A for functional transfers/mobility, Mod A for toileting/LE ADL, continue to assess; unsure of patients progress and if patient will be able to level to go home from [facility]    Occupational Therapy interventions:  Current Treatment Recommendations: Strengthening, Balance Training, Functional Mobility Training, Endurance Training, Neuromuscular Re-education, Patient/Caregiver Education & Training, Equipment Evaluation, Education, & procurement, Self-Care / ADL, Safety Education & Training, Positioning, Gait Training      OCCUPATIONAL THERAPY      Assessment: Pt continues to be limited by fatigue, sequencing/cognition, ataxia, and poor posterior. Continues to demo increased posterior lean and extensor tone, needing x2A for all transfers. Pt does demo increased balance at times needing only Jacquelyn x2 for 2 tranfers but due to fatigue needing increased rest breaks. Pt is making slow progress towards goals and unsure if wife can support physically at home, will CTA. Cont OT POC.         Speech therapy observed

## 2020-01-21 NOTE — PROGRESS NOTES
01/17/2020. Vancomycin delivered (start time changed) at 0500 after trough level obtained. End time 0630.

## 2020-01-21 NOTE — PROGRESS NOTES
medical history of Abnormal leg movement, Aspiration pneumonia ([address] Ataxia, BPH (benign prostatic hyperplasia), Cancer of spine ([address] Carotid stenosis, left, Duodenal ulcer, Encephalopathy, Hyperlipidemia, Hypothyroid, Leukocytosis, Osteopenia, Osteoporosis, Subclavian arterial stenosis ([address] Testosterone deficiency, and Thyroid disease. has a past surgical history that includes Spine surgery (tumor removal); Cataract removal with implant (Left, 8/20/2015); Colonoscopy; Cataract removal with implant (Right, 9-); and gastrostomy (01/17/2019). Restrictions  Restrictions/Precautions  Restrictions/Precautions: General Precautions, Fall Risk  Position Activity Restriction  Other position/activity restrictions: Maintain HOB >30 degrees for feeding. Subjective   General  Chart Reviewed: Yes  Response To Previous Treatment: Patient reporting fatigue but able to participate. Family / Caregiver Present: Yes  Referring Practitioner: Dr. Nba Thompson: found seated in recliner  General Comment  Comments: denies pain. Pain Screening  Patient Currently in Pain: No  Vital Signs  Patient Currently in Pain: No       Orientation  Orientation  Overall Orientation Status: Within Functional Limits  Cognition      Objective   Bed mobility  Supine to Sit: Unable to assess(in chair at start and end of session)  Sit to Supine: Unable to assess  Transfers  Sit to Stand: 2 Person Assistance  Stand to sit: 2 Person Assistance  Stand Pivot Transfers: 2 Person Assistance  Comment: stand pivot from recliner to w/c with RW and min A x 2 (improved COG and posture). sit to stand from w/c to RW x 4 reps with min A x 2 initially regressing to mod A of 2 due to fatigue. continues to require step by step cues. stand pivot from w/c <> low mat with mod A x 2 with step by step directions. pt limited by decreased endurance.    Ambulation  Ambulation?: Yes  Ambulation 1  Surface: level tile  Device: Rolling Walker  Other Apparatus: Wheelchair follow  Assistance: 2 Person assistance;Dependent/Total  Quality of Gait: decreased step lengths to assist with posture/COG, decreased step height, lack of hip flexion during BLE progression, assist for RW management  Gait Deviations: Decreased step height;Decreased head and trunk rotation  Distance: 12 ft   Comments: rqueires w/c follow and mod A of 2 to complete safely. Balance  Sitting - Static: Fair;+  Sitting - Dynamic: Fair  Standing - Static: Fair;-  Standing - Dynamic: Poor;+  Comments: dyn stand task: standing with LUE support on RW, reaching across midline with RUE for cone x 9 reps then placing to target on IL side of body out of ADAM to emphasize upgiht posture with assist ranging from mod A x 1 to mod -max a of 2. repeated activity with LUE reahcing across midline x 5 rps, 4 reps with seated rest break between bouts due to fatigue, therapists providing mod-max a of 2 due to pt's poor posture/posterior COG. pt ocmpleted dyn seated activity: seated trunk flexion then returning to midline x 7 reps with min A -CGA to retrun to upright posture. pt then completed seated reaching activity (across midline for cone then placing to target on IL side) x 6 reps each with grossly CGA-min A to maintain/ return trunk to midline. Goals  Short term goals  Time Frame for Short term goals: 1 week, 7 days, by 1/22/20  Short term goal 1: Pt will perform supine<>sit with CGA. Short term goal 2: Pt will perform sit<>stand with LRAD and Min A x1. Short term goal 3: Pt will ambulate 50 feet with LRAD and Mod A x1. Short term goal 4: Pt will participate in 12-15 reps of BLE ther ex to increase strength and balance. Long term goals  Time Frame for Long term goals : 3 weeks, 21 days, 2/5/20  Long term goal 1: Pt will perform supine<>sit with SBA. Long term goal 2: Pt will perform sit<>stand with LRAD and SBA. Long term goal 3: Pt will ambulate 75 feet with LRAD and CGA.   Long term goal 4: Pt will propel the w/c 500 feet with supervision including 2 turns to the right and left. Patient Goals   Patient goals : To go home. Plan    Plan  Times per week: 5 out of 7 days  Times per day: Daily  Plan weeks: Tentatively 3 weeks, until 2/5  Current Treatment Recommendations: Strengthening, ROM, Balance Training, Functional Mobility Training, Transfer Training, ADL/Self-care Training, Cognitive/Perceptual Training, Endurance Training, Stair training, Gait Training, Wheelchair Mobility Training, Neuromuscular Re-education, Cognitive Reorientation, Patient/Caregiver Education & Training, Safety Education & Training, Home Exercise Program, Equipment Evaluation, Education, & procurement, Positioning  Safety Devices  Type of devices:  All fall risk precautions in place, Call light within reach, Gait belt, Patient at risk for falls, Telesitter in use, Chair alarm in place, Left in chair  Restraints  Initially in place: No     Therapy Time   Individual Concurrent Group Co-treatment   Time In       0930   Time Out       1030   Minutes       60   Timed Code Treatment Minutes: 60 Minutes       Britney Sinha PT

## 2020-01-21 NOTE — PLAN OF CARE
Patient is at risk for nutrition deficit. Barriers assessed. Pt is on a NPO. Proper intake promoted and documented. Supplements given as needed per orders. Will continue to monitor  . NPO, tube fed from 2000 to 7661@ 70 cc/hr, instruction delivered to wife.

## 2020-01-21 NOTE — PROGRESS NOTES
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]    Precautions: falls  Home situation: Lives at home with wife   Dx: [] Aphasia  [] Dysarthria  [] Apraxia   [x] Oropharyngeal dysphagia [x] Cognitive Impairment  [] Other:   Date of Admit: 1/14/2020  Room #: 6002/3676-55    Current functional status (updated daily):         Pt being seen for : [] Speech/Language Treatment  [x] Dysphagia Treatment [x] Cognitive Treatment  [] Other:  Communication: []WFL  [] Aphasia  [x] Dysarthria  [] Apraxia  [] Pragmatic Impairment [] Non-verbal  [] Hearing Loss  [] Other:   Cognition: [] WFL  [] Mild  [x] Moderate  [] Severe [] Unable to Assess  [x] Other: Needs ongoing assessment  Memory: [] WFL  [] Mild  [x] Moderate  [] Severe [] Unable to Assess  [] Other:  Behavior: [x] Alert  [x] Cooperative  []  Pleasant  [] Confused  [] Agitated  [] Uncooperative  [] Distractible [] Motivated  [] Self-Limiting [] Anxious  [] Other:  Endurance:  [] Adequate for participation in SLP sessions  [x] Reduced overall  [] Lethargic  [] Other:  Safety: [] No concerns at this time  [x] Reduced insight into deficits  [x]  Reduced safety awareness [] Not following call light procedures  [] Unable to Assess  [] Other:    Current Diet Order:DIET TUBE FEED CONTINUOUS/CYCLIC NPO; Fluid Restricted (TwoCal HN); Jejunostomy;  Cyclic; 25; 70; 10 (Suggest 8p-6a)  Swallowing Precautions: NPO; Oral care with simultaneous suction        Date: 1/21/2020      Tx session 1  9318-0524 Tx session 2  All tx needs met session 1   Total Timed Code Min 20    Total Treatment Minutes 60    Individual Treatment Minutes 60    Group Treatment Minutes 0 0   Co-Treat Minutes 0 0   Variance/Reason:  N/A    Pain None reported    Pain Intervention [] RN notified  [] Repositioned  [] Intervention offered and patient declined  [x] N/A  [] Other: [] RN notified  [] Repositioned  [] Intervention offered and patient declined  [] N/A  [] Other:   Subjective     Pt fatigued, but agreeable to participate in therapy. Pt's wife present at bedside. Pt's wife reported that both her and pt were awake until 430am this morning. Objective:  Goals     Short-term Goals  Timeframe for Short-term Goals: 10 days (by 1/23/20)    Dysphagia goals:  Goal 1: Pt will complete pharyngeal strengthening ex's with min cues. Pharyngeal strengthening exercises with Vital Stim (NMES) in placement.  -placement 3a, 3.5mA for 28 minutes  -(In November with outpatient speech therapist Jose Alfred, pt was able to tolerate 7.0ma)    Pt completed the following exercises:  -effortful swallow: x10 with max cues provided  -tongue tip hold for 5 seconds per rep, mod cues provided  -Falsetto EE: x10 with mod cues provided      Goal 2: Pt will participate in v-stim tx for improved airway protection and pharyngeal strengthening. Vital Stim orders obtained from MD.    Pt tolerated Vital stim (NMES) placement 3a at 3.5mA for 28 minutes without any adverse effects or reactions. Pt tolerated Vital stim in conjunction with completion of pharyngeal strengthening exercises. See goal 1 above. Goal 3: The patient will tolerate instrumental swallowing procedure. Will complete instrumental swallow study in the future as clinically indicated. Goal 4: The patient will tolerate honey-thick liquids without signs and symptoms of aspiration 10/10 via cup. Did not target    Goal 5: The patient will tolerate puree foods 10/10. Did not target. Pt with significant use of suctioning required for increased saliva and sputum from productive coughing. Pt's wife also reported that pt frequently coughed and required suctioning throughout the night.       Short-term Goals  Timeframe for Short-term Goals: 10 days (by 1/23/20)    Cognitive-Linguistic goals:  Goal 1: Pt will participate in further assessment of cognitive-linguistic skills. Ongoing. Will continue to assess and plan to complete cognitive assessment in future. Goal 2: Pt will increase safety awareness within current environment with min cues. Did not target. Goal 3: Pt will demonstrate insight into deficits with min cues. Pt continues to require mod-max cues for awareness/participation in all tx tasks. Goal 4: Pt will recall new info per 10 min delay with min cues. Functional recall/word list retention task:  -pt presented with five words, asked to repeat the words back to SLP (max cues required)  -pt then provided with a visual cue of each word for improved recall (max cues continued to be required)  - 25% accuracy given MAX cues      Goal 5: Pt will utilize compensatory speech strategies with min cues and 90% speech intelligibility. Targeted indirectly, pt provided with max cues to use clear speech with breath support. Overall reduced intelligibility secondary to reduced RAFIQ. Other areas targeted: N/a    Education:   Education provided re: purpose of vital stim therapy, importance of completing pharyngeal strengthening exercises      Safety Devices: [x] Call light within reach  [x] Chair alarm activated  [] Bed alarm activated  [x] Other: Pt's wife present at bedside    [] Call light within reach  [] Chair alarm activated  [] Bed alarm activated  [] Other:    Assessment: Pt significantly fatigued again this date. Pt's wife present at bedside reporting that both her and pt were awake until 430am this date. Pt required max cues for adequate participation and completion of all tx tasks. Vital stim order obtained from MD- completed today in conjunction with completion of pharyngeal strengthening exercises. Pt required mod-max cues for appropriate completion of strengthening exercises. Increased use of suctioning also required today.

## 2020-01-21 NOTE — PROGRESS NOTES
were no encounter diagnoses. has a past medical history of Abnormal leg movement, Aspiration pneumonia ([address] Ataxia, BPH (benign prostatic hyperplasia), Cancer of spine ([address] Carotid stenosis, left, Duodenal ulcer, Encephalopathy, Hyperlipidemia, Hypothyroid, Leukocytosis, Osteopenia, Osteoporosis, Subclavian arterial stenosis ([address] Testosterone deficiency, and Thyroid disease. has a past surgical history that includes Spine surgery (tumor removal); Cataract removal with implant (Left, 8/20/2015); Colonoscopy; Cataract removal with implant (Right, 9-); and gastrostomy (01/17/2019). Restrictions  Restrictions/Precautions  Restrictions/Precautions: General Precautions, Fall Risk  Position Activity Restriction  Other position/activity restrictions: Maintain HOB >30 degrees for feeding. Subjective   General  Chart Reviewed: Yes  Patient assessed for rehabilitation services?: Yes  Family / Caregiver Present: Yes(wife stayed in room)  Referring Practitioner: Melanie Kee MD  Diagnosis: Critical illness myopathy, Resolving pneumonia, Acute blood loss anemia secondary to GI bleed, Dysphagia   Subjective  Subjective: Pt sitting upright in chair, more awake this date. Seen with cotx for functional transfer training due to poor balance/endurance  Vital Signs  Patient Currently in Pain: Denies   Orientation  Orientation  Overall Orientation Status: Impaired  Orientation Level: Oriented to person;Oriented to place; Disoriented to time;Disoriented to situation  Objective             Balance  Sitting Balance: Stand by assistance  Standing Balance: Dependent/Total(mod-maxA x2 with weight shifting L to R for coordination/standing tasks and transfers; posterior lean and max cues for upright posture and BLE ADAM)  Standing Balance  Time: x1 minute, 2x3 minutes, x3 minutes, 2x45 seconds, x45 seconds  Activity: transfers, standing reaching tasks, mobility trial with RW, stand step transfer WC <> EOM, stand step Neuromuscular Education  Neuromuscular education: Yes  NDT Treatment: Standing; Upper extremity; Lower extremity     Cognition  Overall Cognitive Status: Exceptions  Arousal/Alertness: Delayed responses to stimuli  Following Commands: Follows one step commands with repetition; Follows one step commands with increased time  Attention Span: Attends with cues to redirect  Memory: Decreased short term memory  Safety Judgement: Decreased awareness of need for safety  Problem Solving: Assistance required to identify errors made;Assistance required to implement solutions;Assistance required to generate solutions;Assistance required to correct errors made;Decreased awareness of errors  Insights: Decreased awareness of deficits  Initiation: Requires cues for some  Sequencing: Requires cues for some  Cognition Comment: baseline STM issues     Perception  Overall Perceptual Status: Impaired  Unilateral Attention: Cues to maintain midline in standing;Cues to maintain midline in sitting  Initiation: Hand over hand to initiate tasks  Motor Planning: Hand over hand to sequence tasks  Perseveration: Perseverates during conversation           Upper Extremity Function  NDT Treatment: Standing; Upper extremity; Lower extremity                       Plan   Plan  Times per week: 5-7x/week   Plan weeks: 3  Current Treatment Recommendations: Strengthening, Balance Training, Functional Mobility Training, Endurance Training, Neuromuscular Re-education, Patient/Caregiver Education & Training, Equipment Evaluation, Education, & procurement, Self-Care / ADL, Safety Education & Training, Positioning, Gait Training       Goals  Short term goals  Time Frame for Short term goals: 10 days (by 1/23/20)  Short term goal 1: Pt will complete functional transfers with Min A  Short term goal 2: Pt will complete LE dressing with Mod A   Short term goal 3: Pt will perform 2-3 minutes dynamic standing activity with Mod A  Long term goals  Time Frame for Long term goals : 21 days (by 2/3/20)  Long term goal 1: Pt will complete functional transfers with SBA  Long term goal 2: Pt will complete LE dressing with Min A   Long term goal 3: Pt will perform toileting with Mod A   Patient Goals   Patient goals : \"to go home\"       Therapy Time   Individual Concurrent Group Co-treatment   Time In       0930   Time Out       1030   Minutes       60   Timed Code Treatment Minutes: [address], OTWILLIAM/L

## 2020-01-21 NOTE — PROGRESS NOTES
Difficulty swallowing     Signs and symptoms:  as evidenced by Nutrition support - EN    Objective Information:  · Nutrition-Focused Physical Findings: BM x1 on 1/20.  Pt wife report of pt not sleeping well last night d/t coughing  · Wound Type: None  · Current Nutrition Therapies:  · Oral Diet Orders: NPO   · Oral Diet intake: NPO  · Oral Nutrition Supplement (ONS) Orders: None  · ONS intake: NPO  · Tube Feeding (TF) Orders:   · Feeding Route: Jejunostomy  · Formula: Fluid Restricted(TwoCal HN)  · Duration: Cyclic  · Goal TF & Flush Orders Provides: Recommend nocturnal feed of TwoCal HN (fluid restricted formula) at goal rate of 70 ml/hr x 10 hr (suggest 8p-6a) to provide 700 ml TV, 1400 kcal, 58 gm protein, and 490 ml free fluid + 60 ml H20 flush q 2 hr or flushes per MD  · Additional Calories: From TF  · Anthropometric Measures:  · Ht: 5' 5\" (165.1 cm)   · Current Body Wt: 132 lb 4.4 oz (60 kg)  · Admission Body Wt: 125 lb 14.1 oz (57.1 kg)  · Weight Change: Pt endorses stable weight Hx - PADMINI d/t lack of weight Hx in EMR   · Ideal Body Wt: 136 lb (61.7 kg)   · BMI Classification: BMI 18.5 - 24.9 Normal Weight    Nutrition Interventions:   Modify current Tube Feeding, Continue NPO  Continued Inpatient Monitoring    Nutrition Evaluation:   · Evaluation: Progressing toward goals   · Goals: Pt will tolerate TF at goal rate during ARU stay   · Monitoring: TF Intake, TF Tolerance, Nutrition Progression, Weight, Pertinent Labs      Electronically signed by Sabino Dash RD, LD on 1/21/20 at 12:55 PM    Contact Number: Office: [phone]; [address]: [phone]

## 2020-01-21 NOTE — PLAN OF CARE
Pt remains impulsive at times. Will use call light for needs, is very impatient for help. Education and safety reminders continue.

## 2020-01-21 NOTE — PROGRESS NOTES
kg/m². Rehabilitation Diagnosis:  Neurologic, 3.8, Neuromuscular Disorders, e.g. Critical Illness Myopathy, Other Myopathy     Assessment and Plan:  Critical illness myopathy. Diffusely weak. PT/OT. Nutritional support; dietary consulted.      Leukocytosis. Resolved with all cx ngtd. Off abx. Cough. Suppressant qhs.      Acute blood loss anemia secondary to GI bleed. Outpatient evaluation by GI in 1 month. 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 SLP consulted.      Bowels: Schedule colace + senna. Follow bowel movements. Enema or suppository if needed.      Bladder: Check PVR x 3. [address] if PVR > 200ml or if any volume is > 500 ml.      Sleep: Trazodone provided prn. Tony Manzano MD 1/21/2020, 12:25 PM

## 2020-01-22 PROCEDURE — 87040 BLOOD CULTURE FOR BACTERIA: CPT

## 2020-01-22 PROCEDURE — 6370000000 HC RX 637 (ALT 250 FOR IP): Performed by: PHYSICAL MEDICINE & REHABILITATION

## 2020-01-22 PROCEDURE — 36415 COLL VENOUS BLD VENIPUNCTURE: CPT

## 2020-01-22 PROCEDURE — 97129 THER IVNTJ 1ST 15 MIN: CPT

## 2020-01-22 PROCEDURE — 97530 THERAPEUTIC ACTIVITIES: CPT

## 2020-01-22 PROCEDURE — 92526 ORAL FUNCTION THERAPY: CPT

## 2020-01-22 PROCEDURE — 97116 GAIT TRAINING THERAPY: CPT

## 2020-01-22 PROCEDURE — 97112 NEUROMUSCULAR REEDUCATION: CPT

## 2020-01-22 PROCEDURE — 1280000000 HC REHAB R&B

## 2020-01-22 RX ADMIN — VITAMIN D, TAB 1000IU (100/BT) 1000 UNITS: 25 TAB at 08:29

## 2020-01-22 RX ADMIN — OXYCODONE HYDROCHLORIDE AND ACETAMINOPHEN 500 MG: 500 TABLET ORAL at 08:29

## 2020-01-22 RX ADMIN — LORAZEPAM 0.5 MG: 0.5 TABLET ORAL at 08:29

## 2020-01-22 RX ADMIN — ATORVASTATIN CALCIUM 10 MG: 10 TABLET, FILM COATED ORAL at 22:00

## 2020-01-22 RX ADMIN — FINASTERIDE 5 MG: 5 TABLET, FILM COATED ORAL at 08:29

## 2020-01-22 RX ADMIN — LORAZEPAM 0.5 MG: 0.5 TABLET ORAL at 12:32

## 2020-01-22 RX ADMIN — TRAZODONE HYDROCHLORIDE 50 MG: 50 TABLET ORAL at 22:00

## 2020-01-22 RX ADMIN — LANSOPRAZOLE 15 MG: KIT at 22:03

## 2020-01-22 RX ADMIN — LANSOPRAZOLE 15 MG: KIT at 08:30

## 2020-01-22 RX ADMIN — DOXAZOSIN 1 MG: 2 TABLET ORAL at 08:29

## 2020-01-22 RX ADMIN — LEVOTHYROXINE SODIUM 100 MCG: 100 TABLET ORAL at 05:07

## 2020-01-22 RX ADMIN — PEDIATRIC MULTIPLE VITAMINS W/ IRON DROPS 10 MG/ML 5 ML: 10 SOLUTION at 12:32

## 2020-01-22 ASSESSMENT — PAIN SCALES - GENERAL
PAINLEVEL_OUTOF10: 0

## 2020-01-22 NOTE — PROGRESS NOTES
weak. PT/OT. Nutritional support; dietary consulted.      Leukocytosis. Resolved with all cx ngtd. Off abx. Follow. Cough. Suppressant qhs.      Acute blood loss anemia secondary to GI bleed. Outpatient evaluation by GI in 1 month. 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 SLP consulted.      Bowels: Schedule colace + senna. Follow bowel movements. Enema or suppository if needed.      Bladder: Check PVR x 3. [facility] if PVR > 200ml or if any volume is > 500 ml.      Sleep: Trazodone provided prn. GERONIMO: 18 days, 2/1  DME: Owns    Interdisciplinary team conference was held today with entire rehab treatment team including PT, OT, SLP, Dietician, RN, and SW. Discussion focused on progress toward rehab goals and discharge planning. Barriers: weakness, endurance, medical comorbidities. Total treatment time >35 min with greater than 50% spent in care coordination. Tony Osman MD 1/22/2020, 8:22 AM

## 2020-01-22 NOTE — PROGRESS NOTES
movement, Aspiration pneumonia ([address] Ataxia, BPH (benign prostatic hyperplasia), Cancer of spine ([address] Carotid stenosis, left, Duodenal ulcer, Encephalopathy, Hyperlipidemia, Hypothyroid, Leukocytosis, Osteopenia, Osteoporosis, Subclavian arterial stenosis ([address] Testosterone deficiency, and Thyroid disease. has a past surgical history that includes Spine surgery (tumor removal); Cataract removal with implant (Left, 8/20/2015); Colonoscopy; Cataract removal with implant (Right, 9-); and gastrostomy (01/17/2019). Restrictions  Restrictions/Precautions  Restrictions/Precautions: General Precautions, Fall Risk  Position Activity Restriction  Other position/activity restrictions: Maintain HOB >30 degrees for feeding. Subjective   General  Chart Reviewed: Yes  Response To Previous Treatment: Patient reporting fatigue but able to participate. Family / Caregiver Present: No  Referring Practitioner: Dr. Nicole Ayers: found seated in w/c  General Comment  Comments: denies pain. pt appeared more alert at start of session, however fatiques quickly. Pain Screening  Patient Currently in Pain: No  Vital Signs  Patient Currently in Pain: No       Orientation  Orientation  Overall Orientation Status: Within Functional Limits  Cognition      Objective   Bed mobility  Supine to Sit: Unable to assess  Sit to Supine: Unable to assess  Transfers  Sit to Stand: Moderate Assistance  Stand to sit: Moderate Assistance  Stand Pivot Transfers: Maximum Assistance  Comment: stand pivot from w/c <> chair x 4 reps with RW and max A of 1 (1/4 trials required min A of second person due to significant posterior LOB).  stand pivot transfer from w/c to recliner with RW and max A of 1. pt overall demo'd improved tolerance to transfer training, demo'd improved ADAM and posture resulting in 1 person assist grossly vs 2 person assist. sit to stand from w/c to RW x 8 reps total initially mod A (5/8 trials) regressing to max A due to increase fatigue at end of session. poor eccentric control noted despite cues for safety. Ambulation  Ambulation?: Yes  Ambulation 1  Surface: level tile  Device: Rolling Walker  Other Apparatus: Wheelchair follow  Assistance: 2 Person assistance;Dependent/Total  Quality of Gait: decreased step lengths to assist with posture/COG, decreased step height, lack of hip flexion during BLE progression, assist for RW management  Gait Deviations: Decreased step height;Decreased head and trunk rotation  Distance: 20 ft, 12 ft, 12 ft   Comments: initially mod-max A of 1 for gait (first 18 ft) however regresses to 2 person assist with fatigue and mulitple posterior LOB as well as multiple episodes of BLE bukcling on last bout of gait. pt with poor eccentric control standing. Balance  Sitting - Static: Fair;+  Sitting - Dynamic: Fair  Standing - Static: Fair;-  Standing - Dynamic: Fair  Comments: dyn stand task: LUE support on RW and shoulder width ADAM on tile surface, max VC to maintain appropriate COG and extented posture - reaching for beanbagwith RUE to emphasize anterior weight shift and upright posture x 8 beanbags prior to gatigeu and mod A initially regressing to max A with increased fatigue. Goals  Short term goals  Time Frame for Short term goals: 1 week, 7 days, by 1/22/20  Short term goal 1: Pt will perform supine<>sit with CGA.- GOAL NOT MET. rueqires min A  Short term goal 2: Pt will perform sit<>stand with LRAD and Min A x1.- GOAL NOT MET. rqeuies mod A - max A  Short term goal 3: Pt will ambulate 50 feet with LRAD and Mod A x1.- GOAL NOT MET. limtied by distance and up to 2 person krista t  Short term goal 4: Pt will participate in 12-15 reps of BLE ther ex to increase strength and balance. - GOAL NOT MET. does not tolerate 12-15 reps  Long term goals  Time Frame for Long term goals : 3 weeks, 21 days, 2/5/20  Long term goal 1: Pt will perform supine<>sit with SBA.   Long term goal 2: Pt will perform sit<>stand with LRAD and SBA. Long term goal 3: Pt will ambulate 75 feet with LRAD and CGA. Long term goal 4: Pt will propel the w/c 500 feet with supervision including 2 turns to the right and left. Patient Goals   Patient goals : To go home. Plan    Plan  Times per week: 5 out of 7 days  Times per day: Daily  Plan weeks: Tentatively 3 weeks, until 2/5  Current Treatment Recommendations: Strengthening, ROM, Balance Training, Functional Mobility Training, Transfer Training, ADL/Self-care Training, Cognitive/Perceptual Training, Endurance Training, Stair training, Gait Training, Wheelchair Mobility Training, Neuromuscular Re-education, Cognitive Reorientation, Patient/Caregiver Education & Training, Safety Education & Training, Home Exercise Program, Equipment Evaluation, Education, & procurement, Positioning  Safety Devices  Type of devices:  All fall risk precautions in place, Call light within reach, Gait belt, Patient at risk for falls, Telesitter in use, Chair alarm in place, Left in chair  Restraints  Initially in place: No     Therapy Time   Individual Concurrent Group Co-treatment   Time In       0900   Time Out       1000   Minutes       60   Timed Code Treatment Minutes: 60 Minutes       Antonina Massey, PT

## 2020-01-22 NOTE — PROGRESS NOTES
hold per MD for remainder of afternoon    Pain None reported    Pain Intervention [] RN notified  [] Repositioned  [] Intervention offered and patient declined  [x] N/A  [] Other: [] RN notified  [] Repositioned  [] Intervention offered and patient declined  [] N/A  [] Other:   Subjective     Pt fatigued characterized by frequent eye closure and uncooperative during tx. SLP attempted to see pt in community room, however, pt unable to appropriately participate despite max cues by SLP. Objective:  Goals     Short-term Goals  Timeframe for Short-term Goals: 10 days (by 1/23/20)    Dysphagia goals:  Goal 1: Pt will complete pharyngeal strengthening ex's with min cues. Strengthening Exercises  - Pt instructed to use the following strengthening exercises using a dry swallow. - Pt instructed to elevate tongue tip up and hold for 5 seconds. - Pt completed this exercises twice, max cues provided. - Exercises terminated due to patient fatigue negatively impacting participation. Goal 2: Pt will participate in v-stim tx for improved airway protection and pharyngeal strengthening. Pt tolerated Vital Stim (NMES) placement 3a, for 10 minutes at 4.0mA. Vital Stim terminated due to lack of pt participation and accurate completion of exercises, despite max cues provided. Goal 3: The patient will tolerate instrumental swallowing procedure. Will complete instrumental swallow study in the future as clinically indicated. Goal 4: The patient will tolerate honey-thick liquids without signs and symptoms of aspiration 10/10 via cup. Did not target    Goal 5: The patient will tolerate puree foods 10/10. Did not target. Pt observed with gurgly voice, however, pt did not attempt to clear throat. Short-term Goals  Timeframe for Short-term Goals: 10 days (by 1/23/20)    Cognitive-Linguistic goals:  Goal 1: Pt will participate in further assessment of cognitive-linguistic skills. Ongoing. Additional Information:     Barriers toward progress: Cognitive deficit, moderate-severe oropharyngeal dysphagia  Discharge recommendations:  [] Home independently  [] Home with assistance [x]  24 hour supervision  [] ECF [] Other: See PT/OT  Continued Tx Upon Discharge: ? [x] Yes [] No [] TBD based on progress while on ARU [] Vital Stim indicated [] Other:   Estimated discharge date: 02/01/2020    Interventions used this date:  [] Speech/Language Treatment  [] Instruction in HEP [] Group [x] Dysphagia Treatment [x] Cognitive Treatment   [] Other: Total Time Breakdown / Charges    Time in Time out Total Time / units   Cognitive Tx 1325 1345 20 min/ 1 unit    Speech Tx -- -- --   Dysphagia Tx 1300 1325 25 min/ 1 unit       Electronically Signed by  Angelica Hull   Clinician    Jan FERNANDEZ CCC-SLP  Speech-Language Pathologist  [id number]

## 2020-01-22 NOTE — PROGRESS NOTES
Occupational Therapy  Facility/Department: [facility]  Daily Treatment Note  NAME: Sandy Gerber  : 1940  MRN: [id number]    Date of Service: 2020    Discharge Recommendations:  24 hour supervision or assist, Home with Home health OT, Continue to assess pending progress  OT Equipment Recommendations  Other: Continue to assess, anticipate none    Assessment   Performance deficits / Impairments: Decreased functional mobility ; Decreased safe awareness;Decreased ADL status; Decreased endurance;Decreased posture;Decreased coordination;Decreased balance  Assessment: Pt with increased endurance and balance this date, primarily maxA x1 with CGA x1 for cues for body mechanics and safety with RW. Pt needing rest breaks between stands due to fatigue but better attn and keeping eyes open >50% of session. Pt needing mod-max cues for progression of BLE, tactile cues for pelvic alignment and consistent cues for hand placement to provide body mechanics for safe transition. Pt continues to need substantial assistance and cues, unsafe to return home at this time-- extensive discussion wiht wife on improtance of participation in therapy sessions for physical assistance during transfers. Pts wife does lack insight to deficits with patient, at this time patient will need 24 hour PHYSICAL assistance, will CTA DC recs pending progress. Cont OT POC. Prognosis: Fair  OT Education: OT Role;Plan of Care;ADL Adaptive Strategies;Transfer Training;Orientation; Energy Conservation  Patient Education: transfer training with breaking up extensor tone; ADL training; energy conservation   Barriers to Learning: memory   REQUIRES OT FOLLOW UP: Yes  Activity Tolerance  Activity Tolerance: Treatment limited secondary to decreased cognition;Patient Tolerated treatment well  Safety Devices  Safety Devices in place: Yes  Type of devices: Nurse notified;Gait belt;Patient at risk for falls;Call light within reach; Chair alarm in place; Left in chair  Restraints  Initially in place: No         Patient Diagnosis(es): There were no encounter diagnoses. has a past medical history of Abnormal leg movement, Aspiration pneumonia ([address] Ataxia, BPH (benign prostatic hyperplasia), Cancer of spine ([address] Carotid stenosis, left, Duodenal ulcer, Encephalopathy, Hyperlipidemia, Hypothyroid, Leukocytosis, Osteopenia, Osteoporosis, Subclavian arterial stenosis ([address] Testosterone deficiency, and Thyroid disease. has a past surgical history that includes Spine surgery (tumor removal); Cataract removal with implant (Left, 8/20/2015); Colonoscopy; Cataract removal with implant (Right, 9-); and gastrostomy (01/17/2019). Restrictions  Restrictions/Precautions  Restrictions/Precautions: General Precautions, Fall Risk  Position Activity Restriction  Other position/activity restrictions: Maintain HOB >30 degrees for feeding. Subjective   General  Chart Reviewed: Yes  Patient assessed for rehabilitation services?: Yes  Family / Caregiver Present: Yes  Referring Practitioner: Chelsea Corrigan MD  Diagnosis: Critical illness myopathy, Resolving pneumonia, Acute blood loss anemia secondary to GI bleed, Dysphagia   Subjective  Subjective: Pt sitting upright in chair, more awake this date. Seen with cotx for functional transfer training due to poor balance/endurance      Orientation  Orientation  Overall Orientation Status: Impaired  Orientation Level: Oriented to person;Oriented to place; Disoriented to time;Disoriented to situation  Objective    ADL  Feeding: NPO;Minimal assistance(bringing sponge to mouth)  Grooming: Verbal cueing; Increased time to complete;Minimal assistance(brushing teeth with [place] and mod cues)  Additional Comments: Pt declined further ADL        Balance  Sitting Balance: Stand by assistance  Standing Balance: Maximum assistance(maxA X1, CGA x1 with mod cues; min-modA x1 CGA-[place] x1 for functional mobility, [place] x2 for last sit <> stand transfers due to fatigu)  Standing Balance  Time: 2x30 seconds, 3x2-3 minutes, x2 minutes, x1 minute  Activity: transfers, mobility trial with RW, stand step transfer WC <> chair, standing reaching tasks, stand step WC to recliner  Comment: Extenor tone; posterior lean; limited flexion at knees; pt with better carryover with cues for balance and posture  Functional Mobility  Functional - Mobility Device: Rolling Walker  Activity: Other  Assist Level: Maximum assistance  Functional Mobility Comments: maxA x1 with CGA x1 using RW stand step transfers, min-modA x1, CGA x1 for mobility in hallway-- OT providing tacitle cues for pelvic and upright alignment, progression of BLE during stepping and safety manuevering RW. Pt with poor carryover of hand placement during sit <> stands, green tape applied to front of RW for reminder for hand placement  Bed mobility  Supine to Sit: Unable to assess  Sit to Supine: Unable to assess  Scooting: Unable to assess  Comment: Pt in WC upon arrival and in recliner upon departure  Transfers  Sit to stand: Minimal assistance; Moderate assistance  Stand to sit: Moderate assistance;Minimal assistance  Transfer Comments: fluctuating between [place] x1-2 for 2 transfers but mostly [place] x1 with CGA x1 for transfers, posterior lean with increased hand placement with problem solving and sequencing noted with increased arousal this date        Coordination  Gross Motor: Pt does demo increased coordination with mobility with mod-max tactile cues and increased time. Pt needing pelvic alignment for coordination during mobility, decreased sequencing and safety at times wiht posterior lean in stance. Pt able to stand to reach and gather bean bags L to R to facilitate upright posture and balance but increased flexion posture due to fatigue. Pt wiht better carryover during mobility for upright posture with OT providing cues and PT providing weight shifting.   Fine Motor: continues to need hand over hand

## 2020-01-22 NOTE — PLAN OF CARE
Problem: Pain:  Goal: Control of acute pain  Description  Control of acute pain  Outcome: Ongoing     Problem: Falls - Risk of:  Goal: Will remain free from falls  Description  Will remain free from falls  Outcome: Ongoing  Goal: Absence of physical injury  Description  Absence of physical injury  Outcome: Ongoing     Problem: Nutrition  Goal: Understanding of nutritional guidelines  Outcome: Ongoing     Problem: Neurological  Goal: Maximum potential motor/sensory/cognitive function  Outcome: Ongoing     Problem: Risk for Impaired Skin Integrity  Goal: Tissue integrity - skin and mucous membranes  Description  Structural intactness and normal physiological function of skin and  mucous membranes.   1/22/2020 1109 by Allegra Larry RN  Outcome: Ongoing  1/21/2020 2250 by Bulmaro Perez RN  Outcome: Ongoing     Problem: Airway Clearance - Ineffective:  Goal: Clear lung sounds  Description  Clear lung sounds  Outcome: Ongoing  Goal: Ability to maintain a clear airway will improve  Description  Ability to maintain a clear airway will improve  Outcome: Ongoing

## 2020-01-23 LAB
ANION GAP SERPL CALCULATED.3IONS-SCNC: 10 MMOL/L (ref 3–16)
ANISOCYTOSIS: ABNORMAL
ATYPICAL LYMPHOCYTE RELATIVE PERCENT: 1 % (ref 0–6)
BANDED NEUTROPHILS RELATIVE PERCENT: 22 % (ref 0–7)
BASOPHILS ABSOLUTE: 0.2 K/UL (ref 0–0.2)
BASOPHILS RELATIVE PERCENT: 2 %
BILIRUBIN URINE: NEGATIVE
BLOOD, URINE: NEGATIVE
BUN BLDV-MCNC: 20 MG/DL (ref 7–20)
CALCIUM SERPL-MCNC: 8.7 MG/DL (ref 8.3–10.6)
CHLORIDE BLD-SCNC: 104 MMOL/L (ref 99–110)
CLARITY: CLEAR
CO2: 29 MMOL/L (ref 21–32)
COLOR: YELLOW
CREAT SERPL-MCNC: 0.6 MG/DL (ref 0.8–1.3)
EOSINOPHILS ABSOLUTE: 0.6 K/UL (ref 0–0.6)
EOSINOPHILS RELATIVE PERCENT: 7 %
GFR AFRICAN AMERICAN: >60
GFR NON-AFRICAN AMERICAN: >60
GLUCOSE BLD-MCNC: 66 MG/DL (ref 70–99)
GLUCOSE URINE: NEGATIVE MG/DL
HCT VFR BLD CALC: 34.4 % (ref 40.5–52.5)
HEMOGLOBIN: 11.5 G/DL (ref 13.5–17.5)
KETONES, URINE: NEGATIVE MG/DL
LEUKOCYTE ESTERASE, URINE: NEGATIVE
LYMPHOCYTES ABSOLUTE: 1.8 K/UL (ref 1–5.1)
LYMPHOCYTES RELATIVE PERCENT: 21 %
MCH RBC QN AUTO: 31.4 PG (ref 26–34)
MCHC RBC AUTO-ENTMCNC: 33.4 G/DL (ref 31–36)
MCV RBC AUTO: 94.2 FL (ref 80–100)
METAMYELOCYTES RELATIVE PERCENT: 1 %
MICROSCOPIC EXAMINATION: NORMAL
MONOCYTES ABSOLUTE: 0 K/UL (ref 0–1.3)
MONOCYTES RELATIVE PERCENT: 0 %
MYELOCYTE PERCENT: 2 %
NEUTROPHILS ABSOLUTE: 5.7 K/UL (ref 1.7–7.7)
NEUTROPHILS RELATIVE PERCENT: 44 %
NITRITE, URINE: NEGATIVE
PDW BLD-RTO: 16.8 % (ref 12.4–15.4)
PH UA: 7.5 (ref 5–8)
PLATELET # BLD: 241 K/UL (ref 135–450)
PLATELET SLIDE REVIEW: ADEQUATE
PMV BLD AUTO: 11.6 FL (ref 5–10.5)
POTASSIUM REFLEX MAGNESIUM: 4.5 MMOL/L (ref 3.5–5.1)
PROTEIN UA: NEGATIVE MG/DL
RBC # BLD: 3.65 M/UL (ref 4.2–5.9)
SLIDE REVIEW: ABNORMAL
SODIUM BLD-SCNC: 143 MMOL/L (ref 136–145)
SPECIFIC GRAVITY UA: 1.01 (ref 1–1.03)
URINE TYPE: NORMAL
UROBILINOGEN, URINE: 0.2 E.U./DL
WBC # BLD: 8.3 K/UL (ref 4–11)

## 2020-01-23 PROCEDURE — 85025 COMPLETE CBC W/AUTO DIFF WBC: CPT

## 2020-01-23 PROCEDURE — 97535 SELF CARE MNGMENT TRAINING: CPT

## 2020-01-23 PROCEDURE — 6370000000 HC RX 637 (ALT 250 FOR IP): Performed by: PHYSICAL MEDICINE & REHABILITATION

## 2020-01-23 PROCEDURE — 97530 THERAPEUTIC ACTIVITIES: CPT

## 2020-01-23 PROCEDURE — 97130 THER IVNTJ EA ADDL 15 MIN: CPT

## 2020-01-23 PROCEDURE — 97116 GAIT TRAINING THERAPY: CPT

## 2020-01-23 PROCEDURE — 81003 URINALYSIS AUTO W/O SCOPE: CPT

## 2020-01-23 PROCEDURE — 1280000000 HC REHAB R&B

## 2020-01-23 PROCEDURE — 97129 THER IVNTJ 1ST 15 MIN: CPT

## 2020-01-23 PROCEDURE — 80048 BASIC METABOLIC PNL TOTAL CA: CPT

## 2020-01-23 PROCEDURE — 97112 NEUROMUSCULAR REEDUCATION: CPT

## 2020-01-23 PROCEDURE — 92526 ORAL FUNCTION THERAPY: CPT

## 2020-01-23 PROCEDURE — 36415 COLL VENOUS BLD VENIPUNCTURE: CPT

## 2020-01-23 PROCEDURE — 97542 WHEELCHAIR MNGMENT TRAINING: CPT

## 2020-01-23 RX ADMIN — ATORVASTATIN CALCIUM 10 MG: 10 TABLET, FILM COATED ORAL at 21:30

## 2020-01-23 RX ADMIN — PEDIATRIC MULTIPLE VITAMINS W/ IRON DROPS 10 MG/ML 5 ML: 10 SOLUTION at 08:54

## 2020-01-23 RX ADMIN — VITAMIN D, TAB 1000IU (100/BT) 1000 UNITS: 25 TAB at 08:53

## 2020-01-23 RX ADMIN — TRAZODONE HYDROCHLORIDE 50 MG: 50 TABLET ORAL at 21:30

## 2020-01-23 RX ADMIN — LEVOTHYROXINE SODIUM 100 MCG: 100 TABLET ORAL at 08:52

## 2020-01-23 RX ADMIN — FINASTERIDE 5 MG: 5 TABLET, FILM COATED ORAL at 08:52

## 2020-01-23 RX ADMIN — LANSOPRAZOLE 15 MG: KIT at 08:52

## 2020-01-23 RX ADMIN — OXYCODONE HYDROCHLORIDE AND ACETAMINOPHEN 500 MG: 500 TABLET ORAL at 08:53

## 2020-01-23 RX ADMIN — LANSOPRAZOLE 15 MG: KIT at 21:30

## 2020-01-23 RX ADMIN — DOXAZOSIN 1 MG: 2 TABLET ORAL at 08:53

## 2020-01-23 ASSESSMENT — PAIN SCALES - GENERAL
PAINLEVEL_OUTOF10: 0
PAINLEVEL_OUTOF10: 0

## 2020-01-23 NOTE — CARE COORDINATION
Case Management Acute Rehabilitation Admission Assessment     Objective:  met with the patient to complete initial assessment and review the role of Case Management while on the ARU. Patient educated on team conferences. Discussed family training with the patient/family on how it is encouraged on the unit. Order for \"discharge planning\" has been addressed.  will provide resource information as needed to include star ratings, agency comparison from Medicare. gov web site as well as disclosure of financial ties to any agencies affiliated with [address] [place] [address] to patient and/or their families. 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 Main floor living. Transportation needs provided by spouse. Assistance from [employer] and [facility]. Durable Medical Equipment at home: Has stair lift, walk in shower, hand held shower head, shower chair with back, grab bars in shower, manual wheelchair, rolling walker and four wheeled walker. Services prior to admission: Active with [facility] and [employer]. Patient's goal(s): to return to home setting. Working or Volunteering prior to admission:  __Yes _X_No                        Accessibility to community resources/transportation: Transportation provided by family. Patient no longer drives. Active with:   __[employer]      __[employer]ging  _X_Other: [employer]. Has patient experienced a recent loss or significant life event that would impact their care or ability to participate?   __No  _X_Yes, please explain: Long standing medical issues combined with recent decline and need for hospitalization and rehab may impact mood and/or psychosocial wellbeing. Has patient ever been treated for emotional disorder(s)? _X_No  __Yes, how does this affect current situation? Is patient and family coping appropriately with stressful events and this hospitalization? _X_Yes  __No, please explain:    Support system at home and in the community: Family provides primary sources of support, encouragement and socialization. Caregiver on discharge: Family with agency supports. 24 hour care on discharge: Family with agency supports. What patient needs to be at to return home alone or with family: Patient's spouse states patient needs to be able to stand and pivot to return to home. Discharge plan: Intent is to return to home setting where patient lives with spouse with agency supports from [employer]. Summary:   **ORIENTATION/COGNITION** Mr. Nissa Gunn presents as alert and oriented. He is able to verbally communicate his needs in an appropriate manner. **FAMILY/SOCIAL** Mr. Nissa Gunn is  to [facility]. They have one son, Robbi Kyle who lives out of town. Mr. Nissa Gunn and his wife live in a two story home with community supports. He states he no longer drives and transportation is provided by his wife or [employer]. Mr. Nissa Gunn states he is retired and he worked as an  for [employer]. He denies any tobacco, drug or alcohol use. Hobbies include crossword puzzles, helping fold laundry and going to the mountains. **CODE STATUS** Full code updated 1/14/2020  **LOSSES/CONCERNS/ROOM** No recent losses or concerns noted at this time. Mr. Nissa Gunn resides in a private room while on the acute rehab unit. **DENTAL/HEARING/VISION/MENTAL**  Mr. Nissa Gunn retains his natural teeth, he is hard of hearing and wears bilateral hearing aids. He has had cataract surgery on both eyes with implants and wears glasses for vision assistance.  Mr. Nissa Gunn has no mental health diagnoses noted at this time but has Lorazepam PRN prescribed. **INTENT** Intent is to return to home setting where patient lives with spouse. **Goals for discharge:   DME: Has stair lift, walk in shower, hand held shower head, shower chair with back, grab bars in shower, manual wheelchair, rolling walker and four wheeled walker. PCP: Dr. Corrina Valera: [facility]: [facility]    Electronic continuity of care form is on the chart. Case Management (CM) will continue to follow for recommendations from the treatment team. Please notify Case Management if needs or concerns arise.      [address], LIAN

## 2020-01-23 NOTE — PROGRESS NOTES
p[address]onia ([facility] [address] Ataxia, BPH (benign prostatic hyperplasia), Cancer of spine ([address] Carotid stenosis, left, Duodenal ulcer, Encephalopathy, Hyperlipidemia, Hypothyroid, Leukocytosis, Osteopenia, Osteoporosis, Subclavian arterial stenosis ([address] Testosterone deficiency, and Thyroid disease. has a past surgical history that includes Spine surgery (tumor removal); Cataract removal with implant (Left, 8/20/2015); Colonoscopy; Cataract removal with implant (Right, 9-); and gastrostomy (01/17/2019). Restrictions  Restrictions/Precautions  Restrictions/Precautions: General Precautions, Fall Risk  Position Activity Restriction  Other position/activity restrictions: Maintain HOB >30 degrees for feeding. Subjective   General  Chart Reviewed: Yes  Response To Previous Treatment: Patient reporting fatigue but able to participate. Family / Caregiver Present: No  Referring Practitioner: Dr. Daina Soliz: found in room in [facility] with PCA present en route to commode  General Comment  Comments: denies pain. Pain Screening  Patient Currently in Pain: No  Vital Signs  Patient Currently in Pain: No       Orientation  Orientation  Overall Orientation Status: Within Functional Limits  Cognition      Objective      Transfers  Sit to Stand: Moderate Assistance  Stand to sit: Moderate Assistance  Stand Pivot Transfers: Maximum Assistance  Comment: TD via STEDY to commode (pt initialy in [facility] with staff upno therapist arrival). sit to stand from commode to grab bar with max A, pivot to w/c with HHA and max A. sit to stand from w/c to RW with mod A, assist provided for eccentric control.    Ambulation  Ambulation?: Yes  More Ambulation?: Yes  Ambulation 1  Surface: level tile  Device: Rolling Walker  Other Apparatus: Wheelchair follow  Assistance: 2 Person assistance;Dependent/Total  Quality of Gait: decreased step lengths to assist with posture/COG, decreased step height, lack of hip flexion during BLE

## 2020-01-23 NOTE — DISCHARGE INSTR - COC
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       Isolation/Infection:   Isolation          No Isolation        Patient Infection Status     None to display          Nurse Assessment:  Last Vital Signs: /64   Pulse 83   Temp 97.3 °F (36.3 °C) (Oral)   Resp 20   Ht 5' 5\" (1.651 m)   Wt 132 lb 4.4 oz (60 kg)   SpO2 98%   BMI 22.01 kg/m²     Last documented pain score (0-10 scale): Pain Level: 0  Last Weight:   Wt Readings from Last 1 Encounters:   01/19/20 132 lb 4.4 oz (60 kg)     Mental Status:  oriented, alert and to person and place    IV Access:  - None    Nursing Mobility/ADLs:  Walking   Dependent  Transfer  Dependent  Bathing  Dependent  Dressing  Dependent  Toileting  Dependent  Feeding  Dependent  Med Admin  Dependent  Med Delivery   NPO    Wound Care Documentation and Therapy:        Elimination:  Continence:   · Bowel: No  · Bladder: No  Urinary Catheter: None   Colostomy/Ileostomy/Ileal Conduit: No       Date of Last BM: 1/31/2020      Intake/Output Summary (Last 24 hours) at 1/23/2020 1325  Last data filed at 1/23/2020 0916  Gross per 24 hour   Intake 150 ml   Output --   Net 150 ml     No intake/output data recorded. Safety Concerns:     History of Falls (last 30 days) and At Risk for Falls    Impairments/Disabilities:      Hearing    Nutrition Therapy:  Current Nutrition Therapy:   - Tube Feedings:  Standard with fiber    Routes of Feeding: Gastrostomy Tube and Jejunal Tube  Liquids: NPO  Daily Fluid Restriction: yes - amount ***  Last Modified Barium Swallow with Video (Video Swallowing Test): {Done Not Done [facility]:[id number]}    Treatments at the Time of Hospital Discharge:   Respiratory Treatments: none  Oxygen Therapy:  is not on home oxygen therapy.   Ventilator:    - No ventilator support    Rehab Therapies: Physical Therapy, Occupational Therapy and Speech/Language Therapy  Weight Bearing Status/Restrictions: No weight bearing restirctions  Other Medical Equipment (for information only, NOT a DME order):  wheelchair  Other Treatments:     Patient's personal belongings (please select all that are sent with patient):  Glasses, Hearing Aides bilateral    RN SIGNATURE:  Electronically signed by Ana Vallejo RN on 20 at 10:39 AM    CASE MANAGEMENT/SOCIAL WORK SECTION    Inpatient Status Date: 2020    Readmission Risk Assessment Score: 8%    Discharging to Agency   · Name: [facility]  · Address: [address] [place], [place] [address]  · Phone: [phone]  · Fax:    / signature: LIAN Ruiz    PHYSICIAN SECTION    Prognosis: {Prognosis:8058587343}    Condition at Discharge: Leora Watson Patient Condition:[id number]}    Rehab Potential (if transferring to Rehab): {Prognosis:4022239401}    Recommended Labs or Other Treatments After Discharge: ***    Physician Certification: I certify the above information and transfer of Nehemias Chapin  is necessary for the continuing treatment of the diagnosis listed and that he requires {Admit to Appropriate Level of Care:05921} for {GREATER/LESS:694792906} 30 days.      Update Admission H&P: {CHP DME Changes in GO}    PHYSICIAN SIGNATURE:  {Esignature:[id number]}

## 2020-01-23 NOTE — PROGRESS NOTES
Perfect Serve sent to Dr. Rhett Orellana regarding patient's temperature (rectal temp 93.8). Dr. Rhett Orellana gave orders for blood cultures and urinalysis (see orders).

## 2020-01-23 NOTE — PROGRESS NOTES
memory   REQUIRES OT FOLLOW UP: Yes  Activity Tolerance  Activity Tolerance: Treatment limited secondary to decreased cognition;Patient Tolerated treatment well  Activity Tolerance: Pt with better activity tolerance at start of session but continues to need prolonged rest breaks when fatigued  Safety Devices  Safety Devices in place: Yes  Type of devices: Nurse notified;Gait belt;Patient at risk for falls;Call light within reach; Chair alarm in place; Left in chair  Restraints  Initially in place: No         Patient Diagnosis(es): There were no encounter diagnoses. has a past medical history of Abnormal leg movement, Aspiration pneumonia ([address] Ataxia, BPH (benign prostatic hyperplasia), Cancer of spine ([address] Carotid stenosis, left, Duodenal ulcer, Encephalopathy, Hyperlipidemia, Hypothyroid, Leukocytosis, Osteopenia, Osteoporosis, Subclavian arterial stenosis ([address] Testosterone deficiency, and Thyroid disease. has a past surgical history that includes Spine surgery (tumor removal); Cataract removal with implant (Left, 8/20/2015); Colonoscopy; Cataract removal with implant (Right, 9-); and gastrostomy (01/17/2019). Restrictions  Restrictions/Precautions  Restrictions/Precautions: General Precautions, Fall Risk  Position Activity Restriction  Other position/activity restrictions: Maintain HOB >30 degrees for feeding. Subjective   General  Chart Reviewed: Yes  Patient assessed for rehabilitation services?: Yes  Family / Caregiver Present: Yes  Referring Practitioner: Jass Kinsey MD  Diagnosis: Critical illness myopathy, Resolving pneumonia, Acute blood loss anemia secondary to GI bleed, Dysphagia   Subjective  Subjective: Pt on steady with PCA and RN to get to toilet.   Seen with PT for cotx to address functional mobility/transfers  Vital Signs  Patient Currently in Pain: No   Orientation  Orientation  Overall Orientation Status: Impaired  Orientation Level: Oriented to person;Oriented to place; Disoriented to time;Disoriented to situation  Objective    ADL  Feeding: NPO  LE Dressing: Dependent/Total(x2A for standing/management of pants/brief with PT support x1 and A x1 from OT for threading)  Toileting: Dependent/Total(pt able to sit and complete stella-care with setup, stood x2 to manage pants/brief however)  Additional Comments: Pt with better balance sitting on BSC over otilet however continues to need x2A for standing balance        Balance  Sitting Balance: Stand by assistance  Standing Balance: Maximum assistance(mod-maxA x1 for balance with CGA x1 and max cues for mechanics, hand placement and upright balance)  Standing Balance  Time: x1-2 minutes, x3-4 minutes, x45 seconds  Activity: transfers/ADLs (toileting/LB dressing), mobility trial with RW, stand step transfer WC <> chair  Comment: Extenor tone; posterior lean; limited flexion at knees; pt with better carryover with cues for balance and posture  Functional Mobility  Functional - Mobility Device: Rolling Walker  Activity: Other  Assist Level: Maximum assistance  Functional Mobility Comments: maxA x1 with CGA x1 using RW stand step transfers, min-modA x1, CGA x1 for mobility in [facility]-- OT continues to need to be providing tacitle cues for pelvic and upright alignment, progression of BLE during stepping and safety manuevering RW. Pt with poor carryover of hand placement during sit <> stands  Toilet Transfers  Toilet - Technique: Stand pivot  Equipment Used: Standard toilet  Toilet Transfer: 2 Person assistance; Moderate assistance  Toilet Transfers Comments: mod cues for hand placement and body mechanics  Bed mobility  Supine to Sit: Unable to assess  Sit to Supine: Unable to assess  Scooting: Unable to assess  Transfers  Sit to stand: Minimal assistance; Moderate assistance  Stand to sit: Moderate assistance;Minimal assistance  Transfer Comments: fluctuating between [place] x1-2 for 2 transfers but mostly [place] x1 with CGA x1 for transfers, posterior lean with increased hand placement with problem solving and sequencing noted with increased arousal this date        Coordination  Gross Motor: Pt continues to demo ataxic movements in BLE and UE with mobility, mod-max cues for safety, wider ADAM, and upright balance and support with transfers and mobility. Pt needing hand over hand each time to reach with 1 UE for front of RW and 1 UE for WC arm  Fine Motor: continues to need hand over hand to grasp RW and grab bar              Cognition  Overall Cognitive Status: Exceptions  Arousal/Alertness: Delayed responses to stimuli  Following Commands: Follows one step commands with repetition; Follows one step commands with increased time  Attention Span: Attends with cues to redirect  Memory: Decreased short term memory  Safety Judgement: Decreased awareness of need for safety  Problem Solving: Assistance required to identify errors made;Assistance required to implement solutions;Assistance required to generate solutions;Assistance required to correct errors made;Decreased awareness of errors  Insights: Decreased awareness of deficits  Initiation: Requires cues for some  Sequencing: Requires cues for some  Cognition Comment: baseline STM issues     Perception  Overall Perceptual Status: Impaired  Unilateral Attention: Cues to maintain midline in standing;Cues to maintain midline in sitting  Initiation: Hand over hand to initiate tasks  Motor Planning: Hand over hand to sequence tasks  Perseveration: Perseverates during conversation                                   Plan   Plan  Times per week: 5-7x/week   Plan weeks: 3  Current Treatment Recommendations: Strengthening, Balance Training, Functional Mobility Training, Endurance Training, Neuromuscular Re-education, Patient/Caregiver Education & Training, Equipment Evaluation, Education, & procurement, Self-Care / ADL, Safety Education & Training, Positioning, Gait Training       Goals  Short term goals  Time Frame

## 2020-01-23 NOTE — PROGRESS NOTES
Barbi Dues  1/23/2020  [id number]    Chief Complaint: Critical illness myopathy    Subjective:   Had low temperature overnight that normalized with next vitals check. No symptoms. ROS: No n/v, cp, sob, f/c  Objective:  Patient Vitals for the past 24 hrs:   BP Temp Temp src Pulse Resp SpO2   01/23/20 0800 120/64 97.3 °F (36.3 °C) Oral 83 20 98 %   01/22/20 2230 120/75 93.8 °F (34.3 °C) Rectal 74 16 98 %     Gen: No distress, pleasant. HEENT: Normocephalic, atraumatic. CV: Regular rate and rhythm. Resp: No respiratory distress. Abd: Soft, nontender   Ext: No edema. Neuro: Alert, diffusely weak, cooperative  Wt Readings from Last 3 Encounters:   01/19/20 132 lb 4.4 oz (60 kg)   11/01/19 132 lb (59.9 kg)   01/07/19 133 lb 1 oz (60.4 kg)       Laboratory data:   Lab Results   Component Value Date    WBC 8.3 01/23/2020    HGB 11.5 (L) 01/23/2020    HCT 34.4 (L) 01/23/2020    MCV 94.2 01/23/2020     01/23/2020       Lab Results   Component Value Date     01/23/2020    K 4.5 01/23/2020     01/23/2020    CO2 29 01/23/2020    BUN 20 01/23/2020    CREATININE 0.6 01/23/2020    GLUCOSE 66 01/23/2020    CALCIUM 8.7 01/23/2020        Therapy progress:  PT  Position Activity Restriction  Other position/activity restrictions: Maintain HOB >30 degrees for feeding. Objective     Sit to Stand:  Moderate Assistance  Stand to sit: Moderate Assistance  Bed to Chair: 2 Person Assistance(mod A x 2 progressing to mod A x1, min A x1 at times with RW)  Device: Rolling Walker  Other Apparatus: Wheelchair follow  Assistance: 2 Person assistance, Dependent/Total  Distance: 20 ft, 12 ft, 12 ft   OT  PT Equipment Recommendations  Equipment Needed: No  Other: Pt has w/c, RW, shower chair, and stair lift  Toilet - Technique: Stand pivot  Equipment Used: Standard toilet  Toilet Transfers Comments: mod cues for hand placement and body mechanics  Assessment        SLP  Current Diet : NPO     Diet Solids

## 2020-01-24 PROCEDURE — 97530 THERAPEUTIC ACTIVITIES: CPT

## 2020-01-24 PROCEDURE — 97129 THER IVNTJ 1ST 15 MIN: CPT

## 2020-01-24 PROCEDURE — 1280000000 HC REHAB R&B

## 2020-01-24 PROCEDURE — 6370000000 HC RX 637 (ALT 250 FOR IP): Performed by: PHYSICAL MEDICINE & REHABILITATION

## 2020-01-24 PROCEDURE — 97110 THERAPEUTIC EXERCISES: CPT

## 2020-01-24 PROCEDURE — 97112 NEUROMUSCULAR REEDUCATION: CPT

## 2020-01-24 PROCEDURE — 97130 THER IVNTJ EA ADDL 15 MIN: CPT

## 2020-01-24 PROCEDURE — 92526 ORAL FUNCTION THERAPY: CPT

## 2020-01-24 RX ADMIN — LEVOTHYROXINE SODIUM 100 MCG: 100 TABLET ORAL at 08:41

## 2020-01-24 RX ADMIN — LANSOPRAZOLE 15 MG: KIT at 21:49

## 2020-01-24 RX ADMIN — FINASTERIDE 5 MG: 5 TABLET, FILM COATED ORAL at 08:38

## 2020-01-24 RX ADMIN — VITAMIN D, TAB 1000IU (100/BT) 1000 UNITS: 25 TAB at 08:38

## 2020-01-24 RX ADMIN — DOXAZOSIN 1 MG: 2 TABLET ORAL at 08:37

## 2020-01-24 RX ADMIN — LANSOPRAZOLE 15 MG: KIT at 08:37

## 2020-01-24 RX ADMIN — ATORVASTATIN CALCIUM 10 MG: 10 TABLET, FILM COATED ORAL at 21:49

## 2020-01-24 RX ADMIN — OXYCODONE HYDROCHLORIDE AND ACETAMINOPHEN 500 MG: 500 TABLET ORAL at 08:37

## 2020-01-24 RX ADMIN — PEDIATRIC MULTIPLE VITAMINS W/ IRON DROPS 10 MG/ML 5 ML: 10 SOLUTION at 08:38

## 2020-01-24 ASSESSMENT — PAIN SCALES - GENERAL
PAINLEVEL_OUTOF10: 0
PAINLEVEL_OUTOF10: 0

## 2020-01-24 NOTE — PROGRESS NOTES
Physical Therapy  Facility/Department: [facility]  Daily Treatment Note  NAME: Stacie Jackson  : 1940  MRN: [id number]    Date of Service: 2020    Discharge Recommendations:  24 hour supervision or assist, Continue to assess pending progress   PT Equipment Recommendations  Equipment Needed: No  Other: Pt has w/c, RW, shower chair, and stair lift    Assessment   Assessment: co-treat ith OT for increased sfaety wtih functional mobility during family training. PT facilitatign pt wife and providing cues, OT assisting pt with transfer for increased safety. therapist extensively eduated wife on safe sequencing/body mechanics and hand placement as well as importance of pt obtaining appropriate ADAM/COG. pt wife able to complete multiple transfers with use of RW, however requires consistent cues from PT and assist from OT to faciliate pt and AD managent. At this time recommend pt wife conitnue to participate in family training for increased safety prior to d/c home. Treatment Diagnosis: Decreased functional mobility  Prognosis: Good  Decision Making: Medium Complexity  PT Education: Goals;PT Role;Plan of Care;Transfer Training;Energy Conservation;General Safety;Orientation;Equipment; Family Education;Gait Training;Functional Mobility Training  Patient Education: Pt verbalizes understanding, will require reinforcement. REQUIRES PT FOLLOW UP: Yes  Activity Tolerance  Activity Tolerance: Patient limited by fatigue;Patient limited by endurance     Patient Diagnosis(es): There were no encounter diagnoses. has a past medical history of Abnormal leg movement, Aspiration pneumonia ([address] Ataxia, BPH (benign prostatic hyperplasia), Cancer of spine ([address] Carotid stenosis, left, Duodenal ulcer, Encephalopathy, Hyperlipidemia, Hypothyroid, Leukocytosis, Osteopenia, Osteoporosis, Subclavian arterial stenosis ([address] Testosterone deficiency, and Thyroid disease.    has a past surgical history that includes Spine surgery awareness of body positioning during transfer. Pt repeatedly attempting to sit prior to fully turning to w/c; pt resisting fully pivoting to w/c. Wheelchair mobility: pt propels manual w/c 50ft with SBA using BUE. Pt demo's decreased BUE stroke length (despite mod cues for propulsion), decreased velocity. Distance limited by fatigue; pt declining to complete further distance at this time. Seated BLE exercise: ankle pumps x 15, LAQ 2 x 10, seated marching 2 x 10, seated clamshells x 10 (squeezing ball), resisted hip abd x 10 (orange TB), resisted knee flexion x 10 (orange TB)   Max cues required during exercise completion for correct technique, to slow pacing, and for alertness. Visual demo and consistent verbal cues required for completion of exercises. Pt maintaining eyes closed throughout majority of session despite continued cues for alertness. Pt seated in w/c in gym with SLP at end of session. Goals  Short term goals  Time Frame for Short term goals: 1 week, 7 days, by 1/22/20  Short term goal 1: Pt will perform supine<>sit with CGA.- GOAL NOT MET. rueqires min A  Short term goal 2: Pt will perform sit<>stand with LRAD and Min A x1.- GOAL NOT MET. rqeuies mod A - max A  Short term goal 3: Pt will ambulate 50 feet with LRAD and Mod A x1.- GOAL NOT MET. limtied by distance and up to 2 person krista t  Short term goal 4: Pt will participate in 12-15 reps of BLE ther ex to increase strength and balance. - GOAL NOT MET. does not tolerate 12-15 reps  Long term goals  Time Frame for Long term goals : 3 weeks, 21 days, 2/5/20  Long term goal 1: Pt will perform supine<>sit with SBA. Long term goal 2: Pt will perform sit<>stand with LRAD and SBA. Long term goal 3: Pt will ambulate 75 feet with LRAD and CGA. Long term goal 4: Pt will propel the w/c 500 feet with supervision including 2 turns to the right and left. Patient Goals   Patient goals : To go home.     Plan    Plan  Times per week: 5 out of 7 days  Times per day: Daily  Plan weeks: Tentatively 3 weeks, until 2/5  Current Treatment Recommendations: Strengthening, ROM, Balance Training, Functional Mobility Training, Transfer Training, ADL/Self-care Training, Cognitive/Perceptual Training, Endurance Training, Stair training, Gait Training, Wheelchair Mobility Training, Neuromuscular Re-education, Cognitive Reorientation, Patient/Caregiver Education & Training, Safety Education & Training, Home Exercise Program, Equipment Evaluation, Education, & procurement, Positioning  Safety Devices  Type of devices:  All fall risk precautions in place, Call light within reach, Gait belt, Patient at risk for falls, Telesitter in use, Chair alarm in place, Left in chair  Restraints  Initially in place: No     Therapy Time   Individual Concurrent Group Co-treatment   Time In       1100   Time Out       1130   Minutes       30   Timed Code Treatment Minutes: 30 Minutes      Second Session Therapy Time:   Individual Concurrent Group Co-treatment   Time In 1300        Time Out 1330        Minutes 30        Timed Code Treatment Minutes:  30    Total Treatment Minutes:  [address], [address], [address] [facility], DPT #[id number] (2nd session only)

## 2020-01-24 NOTE — PROGRESS NOTES
Speech Language Pathology  [facility]: ACUTE REHAB UNIT  SPEECH-LANGUAGE PATHOLOGY      [x] Daily  [] Weekly Care Conference Note  [] Discharge    [address]      WBA:0[birth year]  NJS:[id number]  Rehab Dx/Hx: Critical illness myopathy [G72.81]    Precautions: falls  Home situation: Lives at home with wife   Dx: [] Aphasia  [] Dysarthria  [] Apraxia   [x] Oropharyngeal dysphagia [x] Cognitive Impairment  [] Other:   Date of Admit: 1/14/2020  Room #: 6747/8779-51    Current functional status (updated daily):         Pt being seen for : [] Speech/Language Treatment  [x] Dysphagia Treatment [x] Cognitive Treatment  [] Other:  Communication: []WFL  [] Aphasia  [x] Dysarthria  [] Apraxia  [] Pragmatic Impairment [] Non-verbal  [] Hearing Loss  [] Other:   Cognition: [] WFL  [] Mild  [x] Moderate  [] Severe [] Unable to Assess  [x] Other: Needs ongoing assessment  Memory: [] WFL  [] Mild  [x] Moderate  [] Severe [] Unable to Assess  [] Other:  Behavior: [x] Alert  [x] Cooperative  []  Pleasant  [] Confused  [] Agitated  [] Uncooperative  [] Distractible [] Motivated  [] Self-Limiting [] Anxious  [] Other:  Endurance:  [] Adequate for participation in SLP sessions  [x] Reduced overall  [] Lethargic  [] Other:  Safety: [] No concerns at this time  [x] Reduced insight into deficits  [x]  Reduced safety awareness [] Not following call light procedures  [] Unable to Assess  [] Other:    Current Diet Order:DIET TUBE FEED CONTINUOUS/CYCLIC NPO; Fluid Restricted (TwoCal HN); Jejunostomy;  Cyclic; 25; 70; 10 (Suggest 8p-6a)  Swallowing Precautions: NPO; Oral care with simultaneous suction        Date: 1/24/2020      Tx session 1  [id number]  Carol,  Clinician  Tx session 2  [id number]  More Huang MA [facility] SLP   Total Timed Code Min 30 0   Total Treatment Minutes 30 30   Individual Treatment Minutes 0 30   Group Treatment Minutes 0 0   Co-Treat Minutes 30 0   Variance/Reason:  n/a n/a   Pain None reported Npne reported   Pain Intervention [] RN notified  [] Repositioned  [] Intervention offered and patient declined  [x] N/A  [] Other: [] RN notified  [] Repositioned  [] Intervention offered and patient declined  [x] N/A  [] Other:   Subjective     Pt seen for co-treatment with occupational therapy in therapy gym. Pt awake and alert, and agreeable for tx. Pt seen upright on edge of mat. Pt alert and oriented, cooperative and agreeable to participate in therapy. Pt's wife present. Pt seen in community room. Objective:  Goals     Short-term Goals  Timeframe for Short-term Goals: 10 days (by 1/23/20)    Dysphagia goals:  Goal 1: Pt will complete pharyngeal strengthening ex's with min cues. Indirectly targeted. Pt observed to cough and encouraged to effortful swallow due to suction not being available and to exercise pharyngeal muscles. Pt declined swallowing and used a paper towel. Pt completed:  -effortful swallow x10 with mod cues provided  -\"EE\" x5 with max cues provided  -tongue tip to roof of mouth hold for five seconds each, x10 with min cues provided. Goal 2: Pt will participate in v-stim tx for improved airway protection and pharyngeal strengthening. Did not target   Pt tolerated Vital stim for 20 minutes, placement 3a at 7.0mA in conjunction with completion of pharyngeal strengthening exercises. See goal 1 above. Goal 3: The patient will tolerate instrumental swallowing procedure. Will complete instrumental swallow study in the future as clinically indicated. Will complete instrumental swallow study in the future as clinically indicated. Goal 4: The patient will tolerate honey-thick liquids without signs and symptoms of aspiration 10/10 via cup. Did not target Did not target. Goal 5: The patient will tolerate puree foods 10/10. Did not target. Did not target.     Short-term Goals  Timeframe for Short-term Goals: 10 days (by 1/23/20)    Cognitive-Linguistic goals:  Goal 1: Pt will participate in further assessment of cognitive-linguistic skills. Sorting Cards  - Pt instructed to organize and sort cards according to their suit. Given a category associated with each suit (e.g., heart=food) pt provided a name reflecting the category. - Pt sorted cards according to their suit with 100% accuracy.  -Pt required max visual cues (e.g., white board) to recall categories. - Pt independently provided names under each category with 100% accuracy. Did not target. Goal 2: Pt will increase safety awareness within current environment with min cues. Did not target. Did not target. Goal 3: Pt will demonstrate insight into deficits with min cues. Did not target. Did not target. Goal downgraded 01/24/20:  Pt will complete recall tasks with 50% accuracy given mod cues. Goal indirectly targeted during sorting card activity. Pt required max cues to recall categories, despite visual cueing on white board that was provided. See goal 1 above. Did not target. Goal 5: Pt will utilize compensatory speech strategies with min cues and 90% speech intelligibility. Speech Intelligibility  - Pt instructed to take a deep breath and speak upon the exhale to assist with intelligibility and volume. - Pt used intelligible speech with 40% accuracy.   - Pt required max cues to use a inhale and exhale and to use his speech strategies. Did not target. Other areas targeted: N/a    Education:   Educated re: Speech strategies to assist with improving intelligibility.     Education provided re: importance of pharyngeal strengthening exercises    Safety Devices: [] Call light within reach  [] Chair alarm activated  [] Bed alarm activated  [] Other: Pt left in gym for next tx session    [x] Call light within reach  [x] Chair alarm activated  [] Bed alarm activated  [] Other:    Assessment: Session 1: Patient participated well during co-treatment with occupational therapist. Pt alert and cooperative the entire session. Pt demonstrated deficits with recall during naming task given two categories and recalling his speech strategies to assist with intelligibility. Continue goals stated on POC. Session 2: Pt pleasant and cooperative, agreeable to participate in therapy. Pt tolerated vital stim for 20 minutes at 7.0mA without any adverse effects or reactions. Pt with improved alertness this afternoon. Pt continues to require mod-max cues for appropriate completion of pharyngeal strengthening exercises. Plan: Continue as per plan of care. Additional Information:     Barriers toward progress: Cognitive deficit, moderate-severe oropharyngeal dysphagia  Discharge recommendations:  [] Home independently  [] Home with assistance [x]  24 hour supervision  [] ECF [] Other: See PT/OT  Continued Tx Upon Discharge: ? [x] Yes [] No [] TBD based on progress while on ARU [] Vital Stim indicated [] Other:   Estimated discharge date: 02/01/2020    Interventions used this date:  [] Speech/Language Treatment  [] Instruction in HEP [] Group [x] Dysphagia Treatment [x] Cognitive Treatment   [] Other: Total Time Breakdown / Charges    Time in Time out Total Time / units   Cognitive Tx 1030 1100 30 min/ 2 unit   Speech Tx -- -- --   Dysphagia Tx 1330 1400 30 min/ 1 unit       Electronically Signed by  Session 1:  Tess Nieto,  Clinician    Herminio KHAN A CCC-SLP  Speech-Language Pathologist  [id number]      Session 2:  uSzie Aguilar. A CCC-SLP  Speech-Language Pathologist  [id number]

## 2020-01-24 NOTE — PLAN OF CARE
Problem: Nutrition  Goal: Optimal nutrition therapy  Outcome: Ongoing  Note:   Nutrition Problem: Inadequate oral intake  Intervention: Food and/or Nutrient Delivery: Continue current Tube Feeding  Nutritional Goals: Pt will tolerate TF at goal rate during ARU stay'

## 2020-01-24 NOTE — PROGRESS NOTES
Gerber High  1/24/2020  [id number]    Chief Complaint: Critical illness myopathy    Subjective:   No new events overnight; slept well. ROS: No n/v, cp, sob, f/c  Objective:  Patient Vitals for the past 24 hrs:   BP Temp Temp src Pulse Resp SpO2   01/24/20 0834 107/61 98.2 °F (36.8 °C) Oral 99 18 96 %   01/23/20 2137 131/78 97.3 °F (36.3 °C) Oral 77 18 97 %     Gen: No distress, pleasant. HEENT: Normocephalic, atraumatic. CV: Regular rate and rhythm. Resp: No respiratory distress. Lungs clear  Abd: Soft, nontender   Ext: No edema. Neuro: Alert, diffusely weak, cooperative  Wt Readings from Last 3 Encounters:   01/19/20 132 lb 4.4 oz (60 kg)   11/01/19 132 lb (59.9 kg)   01/07/19 133 lb 1 oz (60.4 kg)       Laboratory data:   Lab Results   Component Value Date    WBC 8.3 01/23/2020    HGB 11.5 (L) 01/23/2020    HCT 34.4 (L) 01/23/2020    MCV 94.2 01/23/2020     01/23/2020       Lab Results   Component Value Date     01/23/2020    K 4.5 01/23/2020     01/23/2020    CO2 29 01/23/2020    BUN 20 01/23/2020    CREATININE 0.6 01/23/2020    GLUCOSE 66 01/23/2020    CALCIUM 8.7 01/23/2020        Therapy progress:  PT  Position Activity Restriction  Other position/activity restrictions: Maintain HOB >30 degrees for feeding. Objective     Sit to Stand:  Moderate Assistance  Stand to sit: Moderate Assistance  Bed to Chair: 2 Person Assistance(mod A x 2 progressing to mod A x1, min A x1 at times with RW)  Device: Rolling Walker  Other Apparatus: Wheelchair follow  Assistance: 2 Person assistance, Dependent/Total  Distance: 25 ft   OT  PT Equipment Recommendations  Equipment Needed: No  Other: Pt has w/c, RW, shower chair, and stair lift  Toilet - Technique: Stand pivot  Equipment Used: Standard toilet  Toilet Transfers Comments: mod cues for hand placement and body mechanics  Assessment        SLP  Current Diet : NPO     Diet Solids Recommendation: NPO  Liquid Consistency Recommendation:

## 2020-01-24 NOTE — PROGRESS NOTES
Occupational Therapy  Facility/Department: [facility]  Daily Treatment Note  NAME: Stacie Jackson  : 1940  MRN: [id number]    Date of Service: 2020    Discharge Recommendations:  24 hour supervision or assist, Home with Home health OT, Continue to assess pending progress  OT Equipment Recommendations  Other: Continue to assess, anticipate none    Assessment   Performance deficits / Impairments: Decreased functional mobility ; Decreased safe awareness;Decreased ADL status; Decreased endurance;Decreased posture;Decreased coordination;Decreased balance  Assessment: Pt contiues to be limited by endurance but more alert this date with both cotx sessions with SLP and PT. Pt tolerated 20 minutes sitting EOM with increased attn to SLP tasks, needing no assistance for upright balance throughout activity. Pt then tolerated cotx session with wife for transfer training. Wife extensively was educated on where to stand to transfer patient, where to place [facility], and strategies for commands for mechanics for safe transition. Pt does demo frustration at times but able to be redirected with better awareness. Cont to rec family training to address safe transfers and safe DC home. Cont to rec 24 hour SPV with HHOT. Cont OT POC. Prognosis: Fair  OT Education: OT Role;Plan of Care;ADL Adaptive Strategies;Transfer Training;Orientation; Energy Conservation  Patient Education: transfer training with wife, standing tolerance/balance strategies, pts deficits vs. progress, DC recs, hand placement during transfers, ADL training; energy conservation   Barriers to Learning: memory, motivation, wife's insight to deficits and POC  REQUIRES OT FOLLOW UP: Yes  Activity Tolerance  Activity Tolerance: Treatment limited secondary to decreased cognition;Patient Tolerated treatment well  Activity Tolerance: Pt with better activity tolerance at start of session but continues to need prolonged rest breaks when fatigued  Safety Devices  Safety tasks during SLP cotx session; tolerated ~20 minutes sitting EOM with limited amount of need to rest upper body on ball with OT support. During tranfser training, pt does demo decreased coordination and strength with sit <> stand transfers depending on surface and fatigue of patient  Fine Motor: fair coordination with manipulating cards during SLP activity              Cognition  Overall Cognitive Status: Exceptions  Arousal/Alertness: Delayed responses to stimuli  Following Commands: Follows one step commands with repetition; Follows one step commands with increased time  Attention Span: Attends with cues to redirect  Memory: Decreased short term memory  Safety Judgement: Decreased awareness of need for safety  Problem Solving: Assistance required to identify errors made;Assistance required to implement solutions;Assistance required to generate solutions;Assistance required to correct errors made;Decreased awareness of errors  Insights: Decreased awareness of deficits  Initiation: Requires cues for some  Sequencing: Requires cues for some  Cognition Comment: baseline STM issues     Perception  Overall Perceptual Status: Impaired  Unilateral Attention: Cues to maintain midline in standing;Cues to maintain midline in sitting  Initiation: Hand over hand to initiate tasks  Motor Planning: Hand over hand to sequence tasks  Perseveration: Perseverates during conversation                                   Plan   Plan  Times per week: 5-7x/week   Plan weeks: 3  Current Treatment Recommendations: Strengthening, Balance Training, Functional Mobility Training, Endurance Training, Neuromuscular Re-education, Patient/Caregiver Education & Training, Equipment Evaluation, Education, & procurement, Self-Care / ADL, Safety Education & Training, Positioning, Gait Training    Goals  Short term goals  Time Frame for Short term goals: 10 days (by 1/23/20)  Short term goal 1: Pt will complete functional transfers with Min A-NOT MET,

## 2020-01-25 PROCEDURE — 6370000000 HC RX 637 (ALT 250 FOR IP): Performed by: PHYSICAL MEDICINE & REHABILITATION

## 2020-01-25 PROCEDURE — 1280000000 HC REHAB R&B

## 2020-01-25 RX ADMIN — VITAMIN D, TAB 1000IU (100/BT) 1000 UNITS: 25 TAB at 11:46

## 2020-01-25 RX ADMIN — FINASTERIDE 5 MG: 5 TABLET, FILM COATED ORAL at 11:46

## 2020-01-25 RX ADMIN — LANSOPRAZOLE 15 MG: KIT at 11:48

## 2020-01-25 RX ADMIN — PEDIATRIC MULTIPLE VITAMINS W/ IRON DROPS 10 MG/ML 5 ML: 10 SOLUTION at 11:47

## 2020-01-25 RX ADMIN — ATORVASTATIN CALCIUM 10 MG: 10 TABLET, FILM COATED ORAL at 21:23

## 2020-01-25 RX ADMIN — LEVOTHYROXINE SODIUM 100 MCG: 100 TABLET ORAL at 11:51

## 2020-01-25 RX ADMIN — LANSOPRAZOLE 15 MG: KIT at 21:23

## 2020-01-25 RX ADMIN — TRAZODONE HYDROCHLORIDE 50 MG: 50 TABLET ORAL at 21:23

## 2020-01-25 RX ADMIN — OXYCODONE HYDROCHLORIDE AND ACETAMINOPHEN 500 MG: 500 TABLET ORAL at 11:46

## 2020-01-25 ASSESSMENT — PAIN SCALES - GENERAL
PAINLEVEL_OUTOF10: 0

## 2020-01-26 PROCEDURE — 1280000000 HC REHAB R&B

## 2020-01-26 PROCEDURE — 6370000000 HC RX 637 (ALT 250 FOR IP): Performed by: PHYSICAL MEDICINE & REHABILITATION

## 2020-01-26 RX ADMIN — FINASTERIDE 5 MG: 5 TABLET, FILM COATED ORAL at 10:38

## 2020-01-26 RX ADMIN — DOXAZOSIN 1 MG: 2 TABLET ORAL at 10:39

## 2020-01-26 RX ADMIN — LEVOTHYROXINE SODIUM 100 MCG: 100 TABLET ORAL at 10:38

## 2020-01-26 RX ADMIN — OXYCODONE HYDROCHLORIDE AND ACETAMINOPHEN 500 MG: 500 TABLET ORAL at 10:38

## 2020-01-26 RX ADMIN — LANSOPRAZOLE 15 MG: KIT at 10:38

## 2020-01-26 RX ADMIN — VITAMIN D, TAB 1000IU (100/BT) 1000 UNITS: 25 TAB at 10:38

## 2020-01-26 RX ADMIN — PEDIATRIC MULTIPLE VITAMINS W/ IRON DROPS 10 MG/ML 5 ML: 10 SOLUTION at 10:39

## 2020-01-26 RX ADMIN — LANSOPRAZOLE 15 MG: KIT at 20:26

## 2020-01-26 RX ADMIN — ATORVASTATIN CALCIUM 10 MG: 10 TABLET, FILM COATED ORAL at 20:26

## 2020-01-26 ASSESSMENT — PAIN SCALES - GENERAL
PAINLEVEL_OUTOF10: 0
PAINLEVEL_OUTOF10: 0

## 2020-01-26 NOTE — PLAN OF CARE
Problem: Pain:  Goal: Pain level will decrease  Description  Pain level will decrease  Outcome: Ongoing  Goal: Control of acute pain  Description  Control of acute pain  Outcome: Ongoing     Problem: Falls - Risk of:  Goal: Will remain free from falls  Description  Will remain free from falls  Outcome: Ongoing  Goal: Absence of physical injury  Description  Absence of physical injury  Outcome: Ongoing     Problem: Risk for Impaired Skin Integrity  Goal: Tissue integrity - skin and mucous membranes  Description  Structural intactness and normal physiological function of skin and  mucous membranes.   Outcome: Ongoing

## 2020-01-27 LAB
ANION GAP SERPL CALCULATED.3IONS-SCNC: 11 MMOL/L (ref 3–16)
ANISOCYTOSIS: ABNORMAL
ATYPICAL LYMPHOCYTE RELATIVE PERCENT: 1 % (ref 0–6)
BANDED NEUTROPHILS RELATIVE PERCENT: 17 % (ref 0–7)
BASOPHILS ABSOLUTE: 0 K/UL (ref 0–0.2)
BASOPHILS RELATIVE PERCENT: 0 %
BLOOD CULTURE, ROUTINE: NORMAL
BUN BLDV-MCNC: 30 MG/DL (ref 7–20)
CALCIUM SERPL-MCNC: 9.3 MG/DL (ref 8.3–10.6)
CHLORIDE BLD-SCNC: 101 MMOL/L (ref 99–110)
CO2: 30 MMOL/L (ref 21–32)
CREAT SERPL-MCNC: 0.8 MG/DL (ref 0.8–1.3)
CULTURE, BLOOD 2: NORMAL
EOSINOPHILS ABSOLUTE: 0.5 K/UL (ref 0–0.6)
EOSINOPHILS RELATIVE PERCENT: 5 %
GFR AFRICAN AMERICAN: >60
GFR NON-AFRICAN AMERICAN: >60
GLUCOSE BLD-MCNC: 106 MG/DL (ref 70–99)
HCT VFR BLD CALC: 38.6 % (ref 40.5–52.5)
HEMOGLOBIN: 12.6 G/DL (ref 13.5–17.5)
LYMPHOCYTES ABSOLUTE: 1 K/UL (ref 1–5.1)
LYMPHOCYTES RELATIVE PERCENT: 10 %
MCH RBC QN AUTO: 30.8 PG (ref 26–34)
MCHC RBC AUTO-ENTMCNC: 32.6 G/DL (ref 31–36)
MCV RBC AUTO: 94.5 FL (ref 80–100)
MONOCYTES ABSOLUTE: 0.9 K/UL (ref 0–1.3)
MONOCYTES RELATIVE PERCENT: 10 %
MYELOCYTE PERCENT: 1 %
NEUTROPHILS ABSOLUTE: 7 K/UL (ref 1.7–7.7)
NEUTROPHILS RELATIVE PERCENT: 56 %
PDW BLD-RTO: 17.6 % (ref 12.4–15.4)
PLATELET # BLD: 274 K/UL (ref 135–450)
PMV BLD AUTO: 10.9 FL (ref 5–10.5)
POTASSIUM REFLEX MAGNESIUM: 4.4 MMOL/L (ref 3.5–5.1)
RBC # BLD: 4.08 M/UL (ref 4.2–5.9)
SODIUM BLD-SCNC: 142 MMOL/L (ref 136–145)
WBC # BLD: 9.4 K/UL (ref 4–11)

## 2020-01-27 PROCEDURE — 87077 CULTURE AEROBIC IDENTIFY: CPT

## 2020-01-27 PROCEDURE — 97130 THER IVNTJ EA ADDL 15 MIN: CPT

## 2020-01-27 PROCEDURE — 85025 COMPLETE CBC W/AUTO DIFF WBC: CPT

## 2020-01-27 PROCEDURE — 87186 SC STD MICRODIL/AGAR DIL: CPT

## 2020-01-27 PROCEDURE — 97530 THERAPEUTIC ACTIVITIES: CPT

## 2020-01-27 PROCEDURE — 97112 NEUROMUSCULAR REEDUCATION: CPT

## 2020-01-27 PROCEDURE — 97129 THER IVNTJ 1ST 15 MIN: CPT

## 2020-01-27 PROCEDURE — 87205 SMEAR GRAM STAIN: CPT

## 2020-01-27 PROCEDURE — 92526 ORAL FUNCTION THERAPY: CPT

## 2020-01-27 PROCEDURE — 80048 BASIC METABOLIC PNL TOTAL CA: CPT

## 2020-01-27 PROCEDURE — 87070 CULTURE OTHR SPECIMN AEROBIC: CPT

## 2020-01-27 PROCEDURE — 6370000000 HC RX 637 (ALT 250 FOR IP): Performed by: PHYSICAL MEDICINE & REHABILITATION

## 2020-01-27 PROCEDURE — 1280000000 HC REHAB R&B

## 2020-01-27 PROCEDURE — 36415 COLL VENOUS BLD VENIPUNCTURE: CPT

## 2020-01-27 RX ADMIN — LEVOTHYROXINE SODIUM 100 MCG: 100 TABLET ORAL at 09:01

## 2020-01-27 RX ADMIN — OXYCODONE HYDROCHLORIDE AND ACETAMINOPHEN 500 MG: 500 TABLET ORAL at 08:59

## 2020-01-27 RX ADMIN — DOXAZOSIN 1 MG: 2 TABLET ORAL at 08:59

## 2020-01-27 RX ADMIN — LANSOPRAZOLE 15 MG: KIT at 08:59

## 2020-01-27 RX ADMIN — PEDIATRIC MULTIPLE VITAMINS W/ IRON DROPS 10 MG/ML 5 ML: 10 SOLUTION at 09:00

## 2020-01-27 RX ADMIN — FINASTERIDE 5 MG: 5 TABLET, FILM COATED ORAL at 08:59

## 2020-01-27 RX ADMIN — ATORVASTATIN CALCIUM 10 MG: 10 TABLET, FILM COATED ORAL at 21:02

## 2020-01-27 RX ADMIN — VITAMIN D, TAB 1000IU (100/BT) 1000 UNITS: 25 TAB at 08:59

## 2020-01-27 RX ADMIN — LANSOPRAZOLE 15 MG: KIT at 21:02

## 2020-01-27 ASSESSMENT — PAIN SCALES - GENERAL
PAINLEVEL_OUTOF10: 0

## 2020-01-27 NOTE — PROGRESS NOTES
Occupational Therapy  Facility/Department: [facility]  Daily Treatment Note  NAME: Cynthia Alex  : 1940  MRN: [id number]    Date of Service: 2020    Discharge Recommendations:  24 hour supervision or assist, Home with Home health OT, Continue to assess pending progress  OT Equipment Recommendations  Other: Continue to assess, anticipate none    Assessment   Performance deficits / Impairments: Decreased functional mobility ; Decreased safe awareness;Decreased ADL status; Decreased endurance;Decreased posture;Decreased coordination;Decreased balance  Assessment: Pt continues to demo poor endurance/tolerance as well as inconsistent ability to maintain upright posture with less assistance therefore cotx with PT and SLP for two sessions are still warranted. With PT, OT educated patient and wife on safe transer strategies (where to stand, cues to give, where to place RW and WC). Pt continues to demo poor upright posture and balance with strong posterior lean and extensor tone limiting safety with tranfsers this session, consistently needing maxA x2 with max cues for transition to multiple surfaces with RW. Pt tolerated only 3 stands in // bars to participate in cognition activity with SLP. Poor upright balance, B knees buckling and posterior lean noted unable to redirect to standing upright with maxA for anterior pelvic weight shift. Pt is inconsistent with progress towards goals, will CTA. At this time pt will need 24 hour PHYSICAL assistance at DC. CTA pending progress. Prognosis: Fair  OT Education: OT Role;Plan of Care;ADL Adaptive Strategies;Transfer Training;Orientation; Energy Conservation  Patient Education: transfer training with wife, standing tolerance/balance strategies, pts deficits vs. progress, DC recs, hand placement during transfers, ADL training; energy conservation   Barriers to Learning: memory, motivation, wife's insight to deficits and POC  REQUIRES OT FOLLOW UP: Yes  Activity Tolerance  Activity Tolerance: Treatment limited secondary to decreased cognition;Patient Tolerated treatment well  Activity Tolerance: Pt with poor atolerance this date, needing rest breaks; keeping eyes closed >75% of sessions  Safety Devices  Safety Devices in place: Yes  Restraints  Initially in place: No         Patient Diagnosis(es): There were no encounter diagnoses. has a past medical history of Abnormal leg movement, Aspiration pneumonia ([address] Ataxia, BPH (benign prostatic hyperplasia), Cancer of spine ([address] Carotid stenosis, left, Duodenal ulcer, Encephalopathy, Hyperlipidemia, Hypothyroid, Leukocytosis, Osteopenia, Osteoporosis, Subclavian arterial stenosis ([address] Testosterone deficiency, and Thyroid disease. has a past surgical history that includes Spine surgery (tumor removal); Cataract removal with implant (Left, 8/20/2015); Colonoscopy; Cataract removal with implant (Right, 9-); and gastrostomy (01/17/2019). Restrictions  Restrictions/Precautions  Restrictions/Precautions: General Precautions, Fall Risk  Position Activity Restriction  Other position/activity restrictions: Maintain HOB >30 degrees for feeding. Subjective   General  Chart Reviewed: Yes  Patient assessed for rehabilitation services?: Yes  Family / Caregiver Present: Yes  Referring Practitioner: Celine Rodas MD  Diagnosis: Critical illness myopathy, Resolving pneumonia, Acute blood loss anemia secondary to GI bleed, Dysphagia   Subjective  Subjective: Pt in bed, PCA and RN getting patient dressed. Pt keeping eyes closed and needing max cues for attn  Vital Signs  Patient Currently in Pain: Denies   Orientation  Orientation  Overall Orientation Status: Impaired  Orientation Level: Oriented to person;Oriented to place; Disoriented to time;Disoriented to situation  Objective    ADL  Feeding: NPO  LE Dressing: Dependent/Total  Toileting: Dependent/Total  Additional Comments: Pt does not intitiate movement to assist will continue to address. Pt continues to demo posterior lean with increased hand placement with problem solving and sequencing noted with increased arousal this date; very poor tolerance to upright postition        Coordination  Gross Motor: Poor coordination with reaching for objects with SLP and reaching for RW and chair for sit <> stands with PT. Pt needing hand over hand to faciliate proper hand placement but continues to lack carryover, balance, and endurance limiting upright posture and safety with tasks. Fine Motor: fair coordination with manipulating puzzle pieces during SLP activity        Neuromuscular Education  Neuromuscular education: Yes  NDT Treatment: Standing; Upper extremity; Lower extremity     Cognition  Overall Cognitive Status: Exceptions  Arousal/Alertness: Delayed responses to stimuli  Following Commands: Follows one step commands with repetition; Follows one step commands with increased time  Attention Span: Attends with cues to redirect  Memory: Decreased short term memory  Safety Judgement: Decreased awareness of need for safety  Problem Solving: Assistance required to identify errors made;Assistance required to implement solutions;Assistance required to generate solutions;Assistance required to correct errors made;Decreased awareness of errors  Initiation: Requires cues for some  Sequencing: Requires cues for some  Cognition Comment: baseline STM issues     Perception  Overall Perceptual Status: Impaired  Unilateral Attention: Cues to maintain midline in standing;Cues to maintain midline in sitting  Initiation: Hand over hand to initiate tasks  Motor Planning: Hand over hand to sequence tasks  Perseveration: Perseverates during conversation           Upper Extremity Function  NDT Treatment: Standing; Upper extremity; Lower extremity                       Plan   Plan  Times per week: 5-7x/week   Plan weeks: 3  Current Treatment Recommendations: Strengthening, Balance Training, Functional Mobility Training, Endurance Training, Neuromuscular Re-education, Patient/Caregiver Education & Training, Equipment Evaluation, Education, & procurement, Self-Care / ADL, Safety Education & Training, Positioning, Gait Training       Goals  Short term goals  Time Frame for Short term goals: 10 days (by 1/23/20)  Short term goal 1: Pt will complete functional transfers with Min A-NOT MET, fluctuating between [address] x1-2  Short term goal 2: Pt will complete LE dressing with Mod A -NOT MET, CTA  Short term goal 3: Pt will perform 2-3 minutes dynamic standing activity with Mod A-NOT MET, CTA  Long term goals  Time Frame for Long term goals : 21 days (by 2/3/20)  Long term goal 1: Pt will complete functional transfers with SBA  Long term goal 2: Pt will complete LE dressing with Min A   Long term goal 3: Pt will perform toileting with Mod A   Patient Goals   Patient goals : \"to go home\"       Therapy Time   Individual Concurrent Group Co-treatment   Time In       1000   Time Out       1030   Minutes       30   Timed Code Treatment Minutes: 30 Minutes     Second Session Therapy Time:   Individual Concurrent Group Co-treatment   Time In      1030   Time Out      1100   Minutes      30     Timed Code Treatment Minutes:  30 Minutes    Total Treatment Minutes:  Leandra 43, OTR/L

## 2020-01-27 NOTE — PLAN OF CARE
Skin assessed every shift, patient repositioned with pillow support every two hours, changed as soiled, protective barrier applied.

## 2020-01-27 NOTE — PROGRESS NOTES
Physical Therapy  Facility/Department: [facility]  Daily Treatment Note  NAME: Sandy Gerber  : 1940  MRN: [id number]    Date of Service: 2020    Discharge Recommendations:  24 hour supervision or assist, Continue to assess pending progress, Subacute/Skilled Nursing Facility(24 hr vs SNF)   PT Equipment Recommendations  Equipment Needed: No  Other: Pt has w/c, RW, shower chair, and stair lift    Assessment   Body structures, Functions, Activity limitations: Decreased functional mobility ; Decreased strength;Decreased endurance;Decreased coordination;Decreased posture;Decreased safe awareness;Decreased high-level IADLs;Decreased balance  Assessment: Co-treat with OT for increased safeyt with functional mobility during family training. both pt and wife requiring increased assist from PT/OT for transfers this date. pt demo's significant posterior LOB during all transfers, therapists having to step in to assist pt wife to prevent significant LOB/fall. pt with limited participation durign transfer training, eyes closed throughout and delayed/limited responses to directions provided. At this time, pt wife not safe to transfer pt without assist from therapist/staff. if pt wife unable to provide appropriate assist level, recommend tp d/c SNF for continued skilled therapy to decrease burden of care and increased pt safety. Treatment Diagnosis: Decreased functional mobility  Prognosis: Good  Decision Making: Medium Complexity  PT Education: Goals;PT Role;Plan of Care;Transfer Training;Energy Conservation;General Safety;Orientation;Equipment; Family Education;Gait Training;Functional Mobility Training  Patient Education: Pt verbalizes understanding, will require reinforcement.   REQUIRES PT FOLLOW UP: Yes  Activity Tolerance  Activity Tolerance: Patient limited by fatigue;Patient limited by endurance  Activity Tolerance: limited participation this date     Patient Diagnosis(es): There were no encounter GOAL NOT MET. rqeuies mod A - max A  Short term goal 3: Pt will ambulate 50 feet with LRAD and Mod A x1.- GOAL NOT MET. limtied by distance and up to 2 person krista t  Short term goal 4: Pt will participate in 12-15 reps of BLE ther ex to increase strength and balance. - GOAL NOT MET. does not tolerate 12-15 reps  Long term goals  Time Frame for Long term goals : 3 weeks, 21 days, 2/5/20  Long term goal 1: Pt will perform supine<>sit with SBA. Long term goal 2: Pt will perform sit<>stand with LRAD and SBA. Long term goal 3: Pt will ambulate 75 feet with LRAD and CGA. Long term goal 4: Pt will propel the w/c 500 feet with supervision including 2 turns to the right and left. Patient Goals   Patient goals : To go home. Plan    Plan  Times per week: 5 out of 7 days  Times per day: Daily  Plan weeks: Tentatively 3 weeks, until 2/5  Current Treatment Recommendations: Strengthening, ROM, Balance Training, Functional Mobility Training, Transfer Training, ADL/Self-care Training, Cognitive/Perceptual Training, Endurance Training, Stair training, Gait Training, Wheelchair Mobility Training, Neuromuscular Re-education, Cognitive Reorientation, Patient/Caregiver Education & Training, Safety Education & Training, Home Exercise Program, Equipment Evaluation, Education, & procurement, Positioning  Safety Devices  Type of devices:  All fall risk precautions in place, Gait belt, Patient at risk for falls(left with OT at end of sesion)  Restraints  Initially in place: No     Therapy Time   Individual Concurrent Group Co-treatment   Time In       1000   Time Out       1030   Minutes       30   Timed Code Treatment Minutes: 30 Minutes     Second Session Therapy Time:   Individual Concurrent Group Co-treatment   Time In 1230        Time Out 1300        Minutes 30          Timed Code Treatment Minutes:  30    Total Treatment Minutes:  60    Alonna Lanes, PT

## 2020-01-27 NOTE — PROGRESS NOTES
Patient's wife requested a sputum culture due to patient coughing up yellow/green mucous. Sputum culture collected and sent 1/27/20. Pt with excessive coughing after feeds were started this shift. Decreased feed rate to 35 mL/hr. Coughing improved after feeds were decreased.

## 2020-01-27 NOTE — PROGRESS NOTES
Comments: mod cues for hand placement and body mechanics  Assessment        SLP  Current Diet : NPO     Diet Solids Recommendation: NPO  Liquid Consistency Recommendation: NPO    Body mass index is 20.76 kg/m². Rehabilitation Diagnosis:  Neurologic, 3.8, Neuromuscular Disorders, e.g. Critical Illness Myopathy, Other Myopathy     Assessment and Plan:  Critical illness myopathy. Diffusely weak. PT/OT. Nutritional support; dietary consulted.      Leukocytosis. Resolved with all cx ngtd. Off abx. Follow. Cough. Suppressant qhs.      Acute blood loss anemia secondary to GI bleed. Outpatient evaluation by GI in 1 month. 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 SLP consulted. Tube feeds      Bowels: Schedule colace + senna. Follow bowel movements. Enema or suppository if needed.      Bladder: Check PVR x 3. [facility] if PVR > 200ml or if any volume is > 500 ml.      Sleep: Trazodone provided prn. GERONIMO: 18 days, 2/1  DME: Owns    Tony Winkler MD 1/27/2020, 9:35 AM

## 2020-01-27 NOTE — PLAN OF CARE
Problem: Nutrition  Goal: Optimal nutrition therapy  Outcome: Ongoing  Note:   Nutrition Problem: Inadequate oral intake  Intervention: Food and/or Nutrient Delivery: Continue current Tube Feeding  Nutritional Goals: Pt will tolerate TF at goal rate during ARU stay

## 2020-01-28 ENCOUNTER — APPOINTMENT (OUTPATIENT)
Dept: GENERAL RADIOLOGY | Age: 80
DRG: 091 | End: 2020-01-28
Attending: PHYSICAL MEDICINE & REHABILITATION
Payer: MEDICARE

## 2020-01-28 PROCEDURE — 97116 GAIT TRAINING THERAPY: CPT

## 2020-01-28 PROCEDURE — 92507 TX SP LANG VOICE COMM INDIV: CPT

## 2020-01-28 PROCEDURE — 97112 NEUROMUSCULAR REEDUCATION: CPT

## 2020-01-28 PROCEDURE — 97530 THERAPEUTIC ACTIVITIES: CPT

## 2020-01-28 PROCEDURE — 97535 SELF CARE MNGMENT TRAINING: CPT

## 2020-01-28 PROCEDURE — 97129 THER IVNTJ 1ST 15 MIN: CPT

## 2020-01-28 PROCEDURE — 1280000000 HC REHAB R&B

## 2020-01-28 PROCEDURE — 92526 ORAL FUNCTION THERAPY: CPT

## 2020-01-28 PROCEDURE — 71046 X-RAY EXAM CHEST 2 VIEWS: CPT

## 2020-01-28 PROCEDURE — 97542 WHEELCHAIR MNGMENT TRAINING: CPT

## 2020-01-28 PROCEDURE — 6370000000 HC RX 637 (ALT 250 FOR IP): Performed by: PHYSICAL MEDICINE & REHABILITATION

## 2020-01-28 RX ADMIN — ATORVASTATIN CALCIUM 10 MG: 10 TABLET, FILM COATED ORAL at 20:54

## 2020-01-28 RX ADMIN — PEDIATRIC MULTIPLE VITAMINS W/ IRON DROPS 10 MG/ML 5 ML: 10 SOLUTION at 08:52

## 2020-01-28 RX ADMIN — LANSOPRAZOLE 15 MG: KIT at 08:52

## 2020-01-28 RX ADMIN — OXYCODONE HYDROCHLORIDE AND ACETAMINOPHEN 500 MG: 500 TABLET ORAL at 08:51

## 2020-01-28 RX ADMIN — LEVOTHYROXINE SODIUM 100 MCG: 100 TABLET ORAL at 05:35

## 2020-01-28 RX ADMIN — LANSOPRAZOLE 15 MG: KIT at 20:54

## 2020-01-28 RX ADMIN — VITAMIN D, TAB 1000IU (100/BT) 1000 UNITS: 25 TAB at 08:51

## 2020-01-28 RX ADMIN — FINASTERIDE 5 MG: 5 TABLET, FILM COATED ORAL at 08:51

## 2020-01-28 RX ADMIN — DOXAZOSIN 1 MG: 2 TABLET ORAL at 08:52

## 2020-01-28 ASSESSMENT — PAIN SCALES - GENERAL
PAINLEVEL_OUTOF10: 0

## 2020-01-28 NOTE — PROGRESS NOTES
Chadwick Rodriguez  1/28/2020  [id number]    Chief Complaint: Critical illness myopathy    Subjective:   Had a fall overnight; small scrape on his back but no other apparent injuries or complaints. ROS: No n/v, cp, sob, f/c  Objective:  Patient Vitals for the past 24 hrs:   BP Temp Temp src Pulse Resp SpO2 Weight   01/28/20 0850 123/72 97 °F (36.1 °C) Oral 93 18 95 % --   01/28/20 0315 (!) 150/77 97.5 °F (36.4 °C) Oral 95 18 96 % 125 lb 3.5 oz (56.8 kg)   01/27/20 2030 107/67 97 °F (36.1 °C) Oral 91 18 95 % --     Gen: No distress, pleasant. HEENT: Normocephalic, atraumatic. CV: Regular rate and rhythm. Resp: No respiratory distress. Lungs clear  Abd: Soft, nontender   Ext: No edema. Skin: small erythematous areas over upper back  Neuro: Alert, diffusely weak, cooperative  Wt Readings from Last 3 Encounters:   01/28/20 125 lb 3.5 oz (56.8 kg)   11/01/19 132 lb (59.9 kg)   01/07/19 133 lb 1 oz (60.4 kg)       Laboratory data:   Lab Results   Component Value Date    WBC 9.4 01/27/2020    HGB 12.6 (L) 01/27/2020    HCT 38.6 (L) 01/27/2020    MCV 94.5 01/27/2020     01/27/2020       Lab Results   Component Value Date     01/27/2020    K 4.4 01/27/2020     01/27/2020    CO2 30 01/27/2020    BUN 30 01/27/2020    CREATININE 0.8 01/27/2020    GLUCOSE 106 01/27/2020    CALCIUM 9.3 01/27/2020        Therapy progress:  PT  Position Activity Restriction  Other position/activity restrictions: Maintain HOB >30 degrees for feeding. Objective     Sit to Stand:  Moderate Assistance  Stand to sit: Moderate Assistance  Bed to Chair: 2 Person Assistance(mod A x 2 progressing to mod A x1, min A x1 at times with RW)  Device: Rolling Walker  Other Apparatus: Wheelchair follow  Assistance: Dependent/Total(min-mod A of 2)  Distance: 15 ft, 20 ft   OT  PT Equipment Recommendations  Equipment Needed: No  Other: Pt has w/c, RW, shower chair, and stair lift  Toilet - Technique: Stand pivot  Equipment Used: Standard toilet  Toilet Transfers Comments: mod cues for hand placement and body mechanics  Assessment        SLP  Current Diet : NPO     Diet Solids Recommendation: NPO  Liquid Consistency Recommendation: NPO    Body mass index is 20.84 kg/m². Rehabilitation Diagnosis:  Neurologic, 3.8, Neuromuscular Disorders, e.g. Critical Illness Myopathy, Other Myopathy     Assessment and Plan:  Critical illness myopathy. Diffusely weak. PT/OT. Nutritional support; dietary consulted.      Leukocytosis. Resolved with all cx ngtd. Off abx. Follow. Cough. Suppressant qhs.      Acute blood loss anemia secondary to GI bleed. Outpatient evaluation by GI in 1 month. 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 SLP consulted. Tube feeds      Bowels: Schedule colace + senna. Follow bowel movements. Enema or suppository if needed.      Bladder: Check PVR x 3. [address] if PVR > 200ml or if any volume is > 500 ml.      Sleep: Trazodone provided prn. GERONIMO: 18 days, 2/1  DME: Owns    Tony Manzano MD 1/28/2020, 3:05 PM

## 2020-01-28 NOTE — PROGRESS NOTES
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]    Precautions: falls  Home situation: Lives at home with wife   Dx: [] Aphasia  [] Dysarthria  [] Apraxia   [x] Oropharyngeal dysphagia [x] Cognitive Impairment  [] Other:   Date of Admit: 1/14/2020  Room #: 7723/6446-29    Current functional status (updated daily):         Pt being seen for : [] Speech/Language Treatment  [x] Dysphagia Treatment [x] Cognitive Treatment  [] Other:  Communication: []WFL  [] Aphasia  [x] Dysarthria  [] Apraxia  [] Pragmatic Impairment [] Non-verbal  [] Hearing Loss  [] Other:   Cognition: [] WFL  [] Mild  [x] Moderate  [] Severe [] Unable to Assess  [x] Other: Needs ongoing assessment  Memory: [] WFL  [] Mild  [x] Moderate  [] Severe [] Unable to Assess  [] Other:  Behavior: [x] Alert  [x] Cooperative  []  Pleasant  [] Confused  [] Agitated  [] Uncooperative  [] Distractible [] Motivated  [] Self-Limiting [] Anxious  [] Other:  Endurance:  [] Adequate for participation in SLP sessions  [x] Reduced overall  [] Lethargic  [] Other:  Safety: [] No concerns at this time  [x] Reduced insight into deficits  [x]  Reduced safety awareness [] Not following call light procedures  [] Unable to Assess  [] Other:    Current Diet Order:DIET TUBE FEED CONTINUOUS/CYCLIC NPO; Fluid Restricted (TwoCal HN); Jejunostomy;  Cyclic; 25; 70; 10 (Suggest 8p-6a)  Swallowing Precautions: NPO; Oral care with simultaneous suction        Date: 1/28/2020      Tx session 1  7600-2410  [facility],  Clinician  Tx session 2  [id number]    Total Timed Code Min 30 0   Total Treatment Minutes 30 30   Individual Treatment Minutes 30 30   Group Treatment Minutes 0 0   Co-Treat Minutes 30 30   Variance/Reason:  n/a n/a   Pain None reported None reported Pain Intervention [] RN notified  [] Repositioned  [] Intervention offered and patient declined  [x] N/A  [] Other: [] RN notified  [] Repositioned  [] Intervention offered and patient declined  [x] N/A  [] Other:   Subjective     Pt seen in community room, co treatment with OT. Pt seen in community room, pt's wife present during tx session. Objective:  Goals     Short-term Goals  Timeframe for Short-term Goals: 10 days (by 1/23/20)    Dysphagia goals:  Goal 1: Pt will complete pharyngeal strengthening ex's with min cues. Did not target.  -Effortful swallow: x3 with max cues provided  -jutting jaw forward: x5 with mod cues provided  -tongue tip to roof of mouth holding for 5 seconds: x5 min cues provided. Goal 2: Pt will participate in v-stim tx for improved airway protection and pharyngeal strengthening. Did not target   Pt tolerated vital stim therapy for 20 minutes, placement 3a at 6.0mA, in conjunction with completion of pharyngeal strengthening exercises. No adverse reactions noted with vital stim. Goal 3: The patient will tolerate instrumental swallowing procedure. Will complete instrumental swallow study in the future as clinically indicated. Will complete instrumental swallow study in the future as clinically indicated. Goal 4: The patient will tolerate honey-thick liquids without signs and symptoms of aspiration 10/10 via cup. Did not target. Offered to do PO trials, but pt and pt's wife both declined stating that pt has had increased coughing and expectorating of phlegm since last night and that pt's phlegm is being sent for testing. Goal 5: The patient will tolerate puree foods 10/10. Did not target. Offered to do PO trials, but pt and pt's wife both declined stating that pt has had increased coughing and expectorating of phlegm since last night and that pt's phlegm is being sent for testing.      Cognitive-Linguistic goals:  Goal 1: Pt will participate in further assessment of cognitive-linguistic skills. Functional Naming  - Pt instructed to name a state given a letter of the alphabet to complete a states puzzle. - Pt required mod cues to provide the name of the state. Did not target   Goal 2: Pt will increase safety awareness within current environment with min cues. Did not target   Did not target. Goal 3: Pt will demonstrate insight into deficits with min cues. Did not target Did not target. Goal downgraded 01/24/20:  Pt will complete recall tasks with 50% accuracy given mod cues. Functional naming  - Pt instructed to name a state given a letter of the alphabet to complete a states puzzle. - Pt required mod-max cues to recall directions of task and previously stated states on the puzzle. Did not target. Goal 5: Pt will utilize compensatory speech strategies with min cues and 90% speech intelligibility. Indirectly targeted when naming various states. Mod-max cues were provided to use speech strategies to assist in loud and intelligible speech. Did not target. Other areas targeted: N/a    Education:   Education provided re: rationale for tx tasks provided. Education provided: strict oral care, importance of pharyngeal strengthening exercises. Safety Devices: [] Call light within reach  [] Chair alarm activated  [] Bed alarm activated  [x] Other: OT taking pt back to room   [x] Call light within reach  [] Chair alarm activated  [x] Bed alarm activated  [] Other: pt's wife at bedside   Assessment: Session 1: Pt participated in co-treatment with OT, standing at the parallel bars. Pt was alert and cooperative the entire session. Pt stated names of various states given a letter of the alphabet. Pt continues to demonstrate deficits with recall of the task and previously stated states. Mod-max cues were provided to assist pt in producing loud and intelligible speech. Continue stated goals and POC.      Session 2: Pt seen in community room, vital stim therapy completed. Pt tolerated for vital stim therapy for 20 minutes without any adverse reactions. Pt required mod-max cues for completion of pharyngeal strengthening exercises. No PO trials completed today, pt presenting with increased wet vocal quality. Pt continues to demonstrate with severe oropharyngeal dysphagia. Plan: Continue as per plan of care. Additional Information:     Barriers toward progress: Cognitive deficit, moderate-severe oropharyngeal dysphagia  Discharge recommendations:  [] Home independently  [] Home with assistance [x]  24 hour supervision  [] ECF [] Other: See PT/OT  Continued Tx Upon Discharge: ? [x] Yes [] No [] TBD based on progress while on ARU [] Vital Stim indicated [] Other:   Estimated discharge date: 02/01/2020    Interventions used this date:  [] Speech/Language Treatment  [] Instruction in HEP [] Group [x] Dysphagia Treatment [x] Cognitive Treatment   [] Other: Total Time Breakdown / Charges    Time in Time out Total Time / units   Cognitive Tx 1030 1100 30 min/ 2 units   Speech Tx -- -- --   Dysphagia Tx 1300 1330 30 min/ 1 unit       Electronically Signed by  Hector Luna   Clinician    Carson FERNANDEZ CCC-SLP  Speech-Language Pathologist  [id number]

## 2020-01-28 NOTE — PROGRESS NOTES
Occupational Therapy  Facility/Department: [facility]  Daily Treatment Note  NAME: Chadwick Leggett  : 1940  MRN: [id number]    Date of Service: 2020    Discharge Recommendations:  24 hour supervision or assist, Home with Home health OT, Continue to assess pending progress  OT Equipment Recommendations  Other: Continue to assess, anticipate none    Assessment   Performance deficits / Impairments: Decreased functional mobility ; Decreased safe awareness;Decreased ADL status; Decreased endurance;Decreased posture;Decreased coordination;Decreased balance  Assessment: Pt continues to need x2A for all sit <> stand transfers and mobility trials wiht wife education/support therefore PT/OT cotx still is indicated at this time for safety. Pt was awake and alert this session able to participate with less frequent cues for hand placement and self-correcting intemrittently ADAM during mobility and transfer training. Pt was able to complete 2 bouts of mobility as well as sit EOB to complete UE drssing tasks/education on compensatory dressing techniques. Pt continues to demo R sided lean at times and inability to maintain upright posture in stance and seated tasks. Pt was then able to tolerate 30 minutes seated EOB, SBA for most of SLP cognitive task with intermittent CGA-[place] assist due to fatigue and cues for maintaining upright posture. Cont to rec 24 hour SPV/assistance. Cont OT POC. Prognosis: Fair  OT Education: OT Role;Plan of Care;ADL Adaptive Strategies;Transfer Training;Orientation; Energy Conservation  Patient Education: transfer training with wife, standing tolerance/balance strategies, pts deficits vs. progress, DC recs, hand placement during transfers, ADL training; energy conservation   Barriers to Learning: memory, motivation, wife's insight to deficits and POC  REQUIRES OT FOLLOW UP: Yes  Activity Tolerance  Activity Tolerance: Treatment limited secondary to decreased cognition;Patient Tolerated treatment well  Safety Devices  Safety Devices in place: Yes  Type of devices: Nurse notified;Gait belt;Patient at risk for falls;Call light within reach; Chair alarm in place; Left in chair  Restraints  Initially in place: No         Patient Diagnosis(es): There were no encounter diagnoses. has a past medical history of Abnormal leg movement, Aspiration pneumonia ([facility] [address] Ataxia, BPH (benign prostatic hyperplasia), Cancer of spine ([facility] [address] Carotid stenosis, left, Duodenal ulcer, Encephalopathy, Hyperlipidemia, Hypothyroid, Leukocytosis, Osteopenia, Osteoporosis, Subclavian arterial stenosis ([facility] [address] Testosterone deficiency, and Thyroid disease. has a past surgical history that includes Spine surgery (tumor removal); Cataract removal with implant (Left, 8/20/2015); Colonoscopy; Cataract removal with implant (Right, 9-); and gastrostomy (01/17/2019). Restrictions  Restrictions/Precautions  Restrictions/Precautions: General Precautions, Fall Risk  Position Activity Restriction  Other position/activity restrictions: Maintain HOB >30 degrees for feeding. Subjective   General  Chart Reviewed: Yes  Patient assessed for rehabilitation services?: Yes  Family / Caregiver Present: Yes  Referring Practitioner: Pk Alcaraz MD  Diagnosis: Critical illness myopathy, Resolving pneumonia, Acute blood loss anemia secondary to GI bleed, Dysphagia   Subjective  Subjective: Pt in bed, PCA and RN getting patient dressed. Pt keeping eyes closed and needing max cues for attn      Orientation  Orientation  Overall Orientation Status: Impaired  Orientation Level: Oriented to person;Oriented to place; Disoriented to time;Disoriented to situation  Objective    ADL  Feeding: NPO  Grooming: Setup(shaving face in bed upon arrival to room setup)  UE Dressing: Moderate assistance;Verbal cueing; Increased time to complete([place] for L sleeve through shirt, donning over head; doffed shirts and button down with [place] for balance, assist education: Yes  NDT Treatment: Standing; Upper extremity; Lower extremity     Cognition  Arousal/Alertness: Delayed responses to stimuli  Following Commands: Follows one step commands with repetition; Follows one step commands with increased time  Attention Span: Attends with cues to redirect  Memory: Decreased short term memory  Safety Judgement: Decreased awareness of need for safety  Problem Solving: Assistance required to identify errors made;Assistance required to implement solutions;Assistance required to generate solutions;Assistance required to correct errors made;Decreased awareness of errors  Insights: Decreased awareness of deficits  Initiation: Requires cues for some  Sequencing: Requires cues for some  Cognition Comment: baseline STM issues     Perception  Overall Perceptual Status: Impaired  Unilateral Attention: Cues to maintain midline in standing;Cues to maintain midline in sitting  Initiation: Hand over hand to initiate tasks  Motor Planning: Hand over hand to sequence tasks  Perseveration: Perseverates during conversation           Upper Extremity Function  NDT Treatment: Standing; Upper extremity; Lower extremity                       Plan   Plan  Times per week: 5-7x/week   Plan weeks: 3  Current Treatment Recommendations: Strengthening, Balance Training, Functional Mobility Training, Endurance Training, Neuromuscular Re-education, Patient/Caregiver Education & Training, Equipment Evaluation, Education, & procurement, Self-Care / ADL, Safety Education & Training, Positioning, Gait Training       Goals  Short term goals  Time Frame for Short term goals: 10 days (by 1/23/20)  Short term goal 1: Pt will complete functional transfers with Min A-NOT MET, fluctuating between modA x1-2  Short term goal 2: Pt will complete LE dressing with Mod A -NOT MET, CTA  Short term goal 3: Pt will perform 2-3 minutes dynamic standing activity with Mod A-NOT MET, CTA  Long term goals  Time Frame for Long term goals : 21 days (by 2/3/20)  Long term goal 1: Pt will complete functional transfers with SBA  Long term goal 2: Pt will complete LE dressing with Min A   Long term goal 3: Pt will perform toileting with Mod A   Patient Goals   Patient goals : \"to go home\"       Therapy Time   Individual Concurrent Group Co-treatment   Time In       1030   Time Out       1100   Minutes       30   Timed Code Treatment Minutes: 30 Minutes     Second Session Therapy Time:   Individual Concurrent Group Co-treatment   Time In      1100   Time Out      1130   Minutes      30     Timed Code Treatment Minutes:  30 Minutes    Total Treatment Minutes:  Leandra 43, OTR/L

## 2020-01-28 NOTE — PROGRESS NOTES
addressed this session. Patterns  - Pt provide name of an item starting with the letter of the colored block. - Pt independently named items with 68% accuracy. - it was observed pt provided names that were already stated and asked to provide another item. - Pt required min-mod cues to complete task     Cognitive-Linguistic goals:  Goal 1: Pt will participate in further assessment of cognitive-linguistic skills. Did not target. Did not target   Goal 2: Pt will increase safety awareness within current environment with min cues. Did not target. Did not target. Goal 3: Pt will demonstrate insight into deficits with min cues. Did not target Did not target. Goal downgraded 01/24/20:  Pt will complete recall tasks with 50% accuracy given mod cues. Not addressed this session. Indirectly targeted during naming task. Pt required mod-max cues to recall task, provide a name of object given a letter. Goal 5: Pt will utilize compensatory speech strategies with min cues and 90% speech intelligibility. Not addressed this session. Speech Intelligibility  - Pt instructed to use speech strategies (e.g., deep breath in, speak at the top of breath, loud) to increase intelligibility.   - Pt used intelligible speech independently with 65% accuracy.  - Pt required mod-max cues to use strategies and to increase his volume. - Pt observed to pace himself when saying the word \"yo-yo\". Other areas targeted: N/a    Education:   Educated re: benefits of oral care. Spouse and pt voice good understanding of info discussed. Education provided re: Speech strategies to assist with improving intelligibility   Safety Devices: [x] Call light within reach  [] Chair alarm activated  [x] Bed alarm activated  [x] Other: Spouse present.     [] Call light within reach  [] Chair alarm activated  [] Bed alarm activated  [x] Other:Transfered back to room by OT   Assessment: Session 1: Pt willing to engage in therapy tasks but becomes visibly frustrated when not allowed time to process requests/directions. Pt benefits from gentle encouragement and demonstration and rational for tasks. Pt continues to demonstrate clinical signs of aspiration with honey-thick (moderately-thick liquids) and requires suction to assist in clearance of secretions coughed from pharynx. Recommend continued dysphagia tx to increase potential for safe po intake. Session 2:Patient participated well during co-treatment with occupational therapist. Pt alert throughout the session and pleasant. Pt participated in a pattern activity using colored shapes. Pt demonstrated attention to detail and performed well on this task. During the task, pt instructed to provide name of object starting with a given letter while using his speech strategies. Pt observed to be more intelligible during today's session. Continue stated goals and POC. Plan: Continue as per plan of care. Additional Information:     Barriers toward progress: Cognitive deficit, moderate-severe oropharyngeal dysphagia  Discharge recommendations:  [] Home independently  [] Home with assistance [x]  24 hour supervision  [] ECF [] Other: See PT/OT  Continued Tx Upon Discharge: ? [x] Yes [] No [] TBD based on progress while on ARU [] Vital Stim indicated [] Other:   Estimated discharge date: 02/01/2020    Interventions used this date:  [] Speech/Language Treatment  [] Instruction in HEP [] Group [x] Dysphagia Treatment [x] Cognitive Treatment   [] Other: Total Time Breakdown / Charges    Time in Time out Total Time / units   Cognitive Tx 1100 1115 15 min/ 1 unit   Speech Tx 1115 1130 15 min/  1 unit   Dysphagia Tx 0800 0830 30 min/ 1 unit       Electronically Signed by  Session 1:  Suma Reyes. Michelle Power MArmondA. CCC-SLP  Speech-Language Pathologist      Session 2:  Alessio Eastman   Clinician    Krzysztof FERNANDEZ CCC-SLP  Speech-Language Pathologist  [id number]

## 2020-01-28 NOTE — PROGRESS NOTES
appropriate ADAM        Exercises  Comments: 5 seated abdominal crunches           Second Session:    Pt found seated in recliner, wife present throughout session. Pt denies pain. Stand pivot from recliner to w/c with RW and max A from PT and wife assisting. Wife requires cues for safe body mechanics. W/c mobility x 80 ft with supv and cues to maintain linear path, navigates through doorway and 1 L turn. Wife requesting to practice more stand pivot transfers. Wife requires cues to park w/c safely. Requires cues for initial stand transition from w/c to RW (CGA of therapist at this point), cues for sequencing to pivot to low mat with therapist providing between mod-max A to prevent pt LOB posterior with therapist providing cues to patient for wider ADAM/COG. Pt and wife completed a total of 4 stand pivot transfers with the aforementioned levels of assist from PT. W/c mobility x 70 ft (10 ft carpet) and SBA and increased time due to decreased velocity, cues for obstacle/environment awareness. Goals  Short term goals  Time Frame for Short term goals: 1 week, 7 days, by 1/22/20  Short term goal 1: Pt will perform supine<>sit with CGA.- GOAL NOT MET. rueqires min A  Short term goal 2: Pt will perform sit<>stand with LRAD and Min A x1.- GOAL NOT MET. rqeuies mod A - max A  Short term goal 3: Pt will ambulate 50 feet with LRAD and Mod A x1.- GOAL NOT MET. limtied by distance and up to 2 person krista t  Short term goal 4: Pt will participate in 12-15 reps of BLE ther ex to increase strength and balance. - GOAL NOT MET. does not tolerate 12-15 reps  Long term goals  Time Frame for Long term goals : 3 weeks, 21 days, 2/5/20  Long term goal 1: Pt will perform supine<>sit with SBA. Long term goal 2: Pt will perform sit<>stand with LRAD and SBA. Long term goal 3: Pt will ambulate 75 feet with LRAD and CGA.   Long term goal 4: Pt will propel the w/c 500 feet with supervision including 2 turns to the right and

## 2020-01-28 NOTE — PLAN OF CARE
Problem: Pain:  Goal: Pain level will decrease  Description  Pain level will decrease  Outcome: Ongoing  Goal: Control of acute pain  Description  Control of acute pain  Outcome: Ongoing     Problem: Falls - Risk of:  Goal: Will remain free from falls  Description  Will remain free from falls  Outcome: Ongoing  Goal: Absence of physical injury  Description  Absence of physical injury  Outcome: Ongoing     Problem: Nutrition  Goal: Optimal nutrition therapy  Outcome: Ongoing     Problem: Risk for Impaired Skin Integrity  Goal: Tissue integrity - skin and mucous membranes  Description  Structural intactness and normal physiological function of skin and  mucous membranes. 1/28/2020 1042 by Allegra Larry RN  Outcome: Ongoing  1/27/2020 2309 by Isabel Moser RN  Outcome: Ongoing  Note:   At risk for skin breakdown. See Familia scale. Remains chairfast.  Unable to position self in bed. Turn and reposition every two hours and as needed. Protective zinc barrier placed as needed. Patient kept clean and dry. Pillows used for positioning. Will continue to monitor for skin breakdown.         Problem: Airway Clearance - Ineffective:  Goal: Clear lung sounds  Description  Clear lung sounds  Outcome: Ongoing

## 2020-01-28 NOTE — PATIENT CARE CONFERENCE
Wheelchair Mobility Training, Neuromuscular Re-education, Cognitive Reorientation, Patient/Caregiver Education & Training, Safety Education & Training, Home Exercise Program, Equipment Evaluation, Education, & procurement, Positioning      PHYSICAL THERAPY         PT Equipment Recommendations  Equipment Needed: No  Other: Pt has w/c, RW, shower chair, and stair lift    Assessment: Co-treat with OT for increased safeyt with functional mobility during family training. both pt and wife requiring increased assist from PT/OT for transfers this date. pt demo's significant posterior LOB during all transfers, therapists having to step in to assist pt wife to prevent significant LOB/fall. pt with limited participation durign transfer training, eyes closed throughout and delayed/limited responses to directions provided. At this time, pt wife not safe to transfer pt without assist from therapist/staff. if pt wife unable to provide appropriate assist level, recommend tp d/c SNF for continued skilled therapy to decrease burden of care and increased pt safety.      Occupational therapy observed barriers to dc:    Baseline: Some assist for mobility up to 50' and transfers (unsure of level), needs assist for LE ADL/toileting, has assist for most IADL              Current level: min-modA X2 for functional transfers, mobility, most UE/LE ADL and toileting              Barriers to DC: balance, safety awareness, involuntary movement              Needs in order to achieve dc home/next level of care: Min A for functional transfers/mobility, Mod A for toileting/LE ADL, continue to assess; unsure of patients progress and if patient will be able to level to go home from IPR    Occupational Therapy interventions:  Current Treatment Recommendations: Strengthening, Balance Training, Functional Mobility Training, Endurance Training, Neuromuscular Re-education, Patient/Caregiver Education & Training, Equipment Evaluation, Education, &

## 2020-01-29 PROCEDURE — 97112 NEUROMUSCULAR REEDUCATION: CPT

## 2020-01-29 PROCEDURE — 97535 SELF CARE MNGMENT TRAINING: CPT

## 2020-01-29 PROCEDURE — 97530 THERAPEUTIC ACTIVITIES: CPT

## 2020-01-29 PROCEDURE — 97129 THER IVNTJ 1ST 15 MIN: CPT

## 2020-01-29 PROCEDURE — 97130 THER IVNTJ EA ADDL 15 MIN: CPT

## 2020-01-29 PROCEDURE — 6370000000 HC RX 637 (ALT 250 FOR IP): Performed by: PHYSICAL MEDICINE & REHABILITATION

## 2020-01-29 PROCEDURE — 97110 THERAPEUTIC EXERCISES: CPT

## 2020-01-29 PROCEDURE — 97116 GAIT TRAINING THERAPY: CPT

## 2020-01-29 PROCEDURE — 92526 ORAL FUNCTION THERAPY: CPT

## 2020-01-29 PROCEDURE — 92507 TX SP LANG VOICE COMM INDIV: CPT

## 2020-01-29 PROCEDURE — 99253 IP/OBS CNSLTJ NEW/EST LOW 45: CPT | Performed by: INTERNAL MEDICINE

## 2020-01-29 PROCEDURE — 97542 WHEELCHAIR MNGMENT TRAINING: CPT

## 2020-01-29 PROCEDURE — 1280000000 HC REHAB R&B

## 2020-01-29 PROCEDURE — 6370000000 HC RX 637 (ALT 250 FOR IP): Performed by: INTERNAL MEDICINE

## 2020-01-29 RX ORDER — LEVOFLOXACIN 500 MG/1
500 TABLET, FILM COATED ORAL DAILY
Status: DISCONTINUED | OUTPATIENT
Start: 2020-01-29 | End: 2020-01-31 | Stop reason: HOSPADM

## 2020-01-29 RX ADMIN — LEVOTHYROXINE SODIUM 100 MCG: 100 TABLET ORAL at 06:04

## 2020-01-29 RX ADMIN — ATORVASTATIN CALCIUM 10 MG: 10 TABLET, FILM COATED ORAL at 19:46

## 2020-01-29 RX ADMIN — LANSOPRAZOLE 15 MG: KIT at 08:43

## 2020-01-29 RX ADMIN — DOXAZOSIN 1 MG: 2 TABLET ORAL at 08:43

## 2020-01-29 RX ADMIN — FINASTERIDE 5 MG: 5 TABLET, FILM COATED ORAL at 08:43

## 2020-01-29 RX ADMIN — PEDIATRIC MULTIPLE VITAMINS W/ IRON DROPS 10 MG/ML 5 ML: 10 SOLUTION at 12:42

## 2020-01-29 RX ADMIN — LANSOPRAZOLE 15 MG: KIT at 19:46

## 2020-01-29 RX ADMIN — LEVOFLOXACIN 500 MG: 500 TABLET, FILM COATED ORAL at 15:20

## 2020-01-29 RX ADMIN — VITAMIN D, TAB 1000IU (100/BT) 1000 UNITS: 25 TAB at 08:43

## 2020-01-29 RX ADMIN — OXYCODONE HYDROCHLORIDE AND ACETAMINOPHEN 500 MG: 500 TABLET ORAL at 08:43

## 2020-01-29 ASSESSMENT — PAIN SCALES - GENERAL: PAINLEVEL_OUTOF10: 0

## 2020-01-29 NOTE — PROGRESS NOTES
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]    Precautions: falls  Home situation: Lives at home with wife   Dx: [] Aphasia  [] Dysarthria  [] Apraxia   [x] Oropharyngeal dysphagia [x] Cognitive Impairment  [] Other:   Date of Admit: 1/14/2020  Room #: 3319/1386-32    Current functional status (updated daily):         Pt being seen for : [] Speech/Language Treatment  [x] Dysphagia Treatment [x] Cognitive Treatment  [] Other:  Communication: []WFL  [] Aphasia  [x] Dysarthria  [] Apraxia  [] Pragmatic Impairment [] Non-verbal  [] Hearing Loss  [] Other:   Cognition: [] WFL  [] Mild  [x] Moderate  [] Severe [] Unable to Assess  [x] Other: Needs ongoing assessment  Memory: [] WFL  [] Mild  [x] Moderate  [] Severe [] Unable to Assess  [] Other:  Behavior: [x] Alert  [x] Cooperative  []  Pleasant  [] Confused  [] Agitated  [] Uncooperative  [] Distractible [] Motivated  [] Self-Limiting [] Anxious  [] Other:  Endurance:  [] Adequate for participation in SLP sessions  [x] Reduced overall  [] Lethargic  [] Other:  Safety: [] No concerns at this time  [x] Reduced insight into deficits  [x]  Reduced safety awareness [] Not following call light procedures  [] Unable to Assess  [] Other:    Current Diet Order:DIET TUBE FEED CONTINUOUS/CYCLIC NPO; Fluid Restricted (TwoCal HN); Jejunostomy;  Cyclic; 25; 70; 10 (Suggest 8p-6a)  Swallowing Precautions: NPO; Oral care with simultaneous suction        Date: 1/29/2020      Tx session 1  [id number]  Ramón Dove  Clinician    Tx session 2  [id number]  Ramón Dove  Clinician    Total Timed Code Min 15 0   Total Treatment Minutes 30 30   Individual Treatment Minutes 0 30   Group Treatment Minutes 0 0   Co-Treat Minutes 30 0   Variance/Reason:  n/a n/a   Pain None reported None reported   Pain Intervention [] RN notified  [] Repositioned  [] Intervention offered and patient declined  [x] N/A  [] Other: [] RN notified  [] Repositioned  [] Intervention offered and patient declined  [x] N/A  [] Other:   Subjective     Pt seen for co-treatment with OT in the gym on the edge of the mat. Pt alert, agreeable, and cooperative for the duration of the session. Pt seen upright in chair in his room. Pt alert and cooperative throughout the session. Objective:  Goals     Short-term Goals  Timeframe for Short-term Goals: 10 days (by 1/23/20)    Dysphagia goals:  Goal 1: Pt will complete pharyngeal strengthening ex's with min cues. Did not target. The following pharyngeal strengthening exercises were targeted; Tongue elevation and hold for 5 seconds: 10x    Effortful swallow  - Pt attempted effortful swallow 3x, however, pt had severe difficulty due to lack of saliva. Goal 2: Pt will participate in v-stim tx for improved airway protection and pharyngeal strengthening. Did not target   Pt tolerated vital stim (NMES) at placement 3a for 20 minutes, 7.5mA. Pt tolerated without any adverse reactions/effects. Goal 3: The patient will tolerate instrumental swallowing procedure. Will complete instrumental swallow study in the future as clinically indicated. Planning to complete a MBSS on Friday 01/31/20. Pt and pt's wife informed and in agreement. Will obtain order from MD.    Goal 4: The patient will tolerate honey-thick liquids without signs and symptoms of aspiration 10/10 via cup. Did not target Did not target. Goal 5: The patient will tolerate puree foods 10/10. Did not target. Did not target. Cognitive-Linguistic goals:  Goal 1: Pt will participate in further assessment of cognitive-linguistic skills. Did not target. Did not target. Goal 2: Pt will increase safety awareness within current environment with min cues.    Did not target. Did not target. Goal 3: Pt will demonstrate insight into deficits with min cues. Did not target. Goal indirectly targeted. Pt benefits from reinforcement and cueing required for strict aspiration precautions and completion of oral care. Goal downgraded 01/24/20:  Pt will complete recall tasks with 50% accuracy given mod cues. Card sorting  - Pt given two categories, instructed to recall the categories and provide an item within the category that was associated with the suit. - Pt independently recalled categories with 40% accuracy. - Accuracy improved with mod-max visual cues. Functional Naming  - Pt instructed to provide a name of an item given a category independently with 90% accuracy, improving to 100% with min cues. Did not target. Goal 5: Pt will utilize compensatory speech strategies with min cues and 90% speech intelligibility. Compensatory Strategies  - Pt instructed to take a deep breath in and speak at the top of the break to increase intelligibility and volume. - Pt used intelligible speech 60% during the session.   - Deficits in recall/memory negatively impacted remembering speech strategies to assist with intelligibility. Did not target. Other areas targeted: - Pt observed to clear throat and swallow during the session 2x. Pt engaged in history trivia questions between pharyngeal exercises to assist in alertness during tx. Education:   Educated re: Recall strategies, speech strategies. Education provided re: importance of pharyngeal strengthening exercises, MBSS to be completed on Friday, strict aspiration precautions and oral care precautions. Safety Devices: [] Call light within reach  [] Chair alarm activated  [] Bed alarm activated  [x] Other: Transferred to other tx.     [x] Call light within reach  [x] Chair alarm activated  [] Bed alarm activated  [x] Other: pt's wife present at bedside     Assessment: Session 1: Pt alert and agreeable for co-treatment with OT, seen upright in gym on the edge of the mat. Pt participated in recall/memory tasks given two categories in a card sorting activity. It was observed pt's recall has improved, a visual cue was provided as needed (Ee.g., whiteboard). Pt reminded of speech strategies to increase speech intelligibility. Pt continues to make progress towards goals, continue stated goals and POC. Session 2: Pt alert and cooperative for tx in room. Pt seen upright in chair, pt's wife present during session. Pt tolerated vital stim (NMES) at placement 3a for 20 minutes, 7.5mA. Pt tolerated without any adverse reactions/effects. Pt educated on rationale of pharyngeal strengthening exercises. Pt participated in the following pharyngeal strengthening exercises; tongue tip elevation and hold, and effortful swallow. Continue stated goals and POC. Plan: Continue as per plan of care. Additional Information:     Barriers toward progress: Cognitive deficit, moderate-severe oropharyngeal dysphagia  Discharge recommendations:  [] Home independently  [] Home with assistance [x]  24 hour supervision  [] ECF [] Other: See PT/OT  Continued Tx Upon Discharge: ? [x] Yes [] No [] TBD based on progress while on ARU [] Vital Stim indicated [] Other:   Estimated discharge date: 02/01/2020    Interventions used this date:  [] Speech/Language Treatment  [] Instruction in HEP [] Group [x] Dysphagia Treatment [x] Cognitive Treatment   [] Other: Total Time Breakdown / Charges    Time in Time out Total Time / units   Cognitive Tx 0930 0945 15 min/ 1 unit   Speech Tx 0945 1000 15 min/  1 unit   Dysphagia Tx 1330 1400 30 min/ 1 unit       Electronically Signed by  Liliam Nguyen   Clinician    Sarah FERNANDEZ CCC-SLP  Speech-Language Pathologist  [id number]

## 2020-01-29 NOTE — PROGRESS NOTES
Yes  Activity Tolerance  Activity Tolerance: Treatment limited secondary to decreased cognition;Patient Tolerated treatment well  Activity Tolerance: Pt with better tolerance to sitting and standing tasks but still continues to demo fatigue and poor safety/sequencing needing cues for body mechanics during functional mobility tasks. Safety Devices  Safety Devices in place: Yes  Type of devices: Nurse notified;Gait belt;Patient at risk for falls;Call light within reach; Left in chair;Chair alarm in place  Restraints  Initially in place: No         Patient Diagnosis(es): There were no encounter diagnoses. has a past medical history of Abnormal leg movement, Aspiration pneumonia ([address] Ataxia, BPH (benign prostatic hyperplasia), Cancer of spine ([address] Carotid stenosis, left, Duodenal ulcer, Encephalopathy, Hyperlipidemia, Hypothyroid, Leukocytosis, Osteopenia, Osteoporosis, Subclavian arterial stenosis ([address] Testosterone deficiency, and Thyroid disease. has a past surgical history that includes Spine surgery (tumor removal); Cataract removal with implant (Left, 8/20/2015); Colonoscopy; Cataract removal with implant (Right, 9-); and gastrostomy (01/17/2019). Restrictions  Restrictions/Precautions  Restrictions/Precautions: General Precautions, Fall Risk  Position Activity Restriction  Other position/activity restrictions: Maintain HOB >30 degrees for feeding. Subjective   General  Chart Reviewed: Yes  Patient assessed for rehabilitation services?: Yes  Family / Caregiver Present: Yes  Referring Practitioner: Jadine Cockayne, MD  Diagnosis: Critical illness myopathy, Resolving pneumonia, Acute blood loss anemia secondary to GI bleed, Dysphagia   Subjective  Subjective: Pt sitting in WC finshing with PT, agreeable to sessions. First session seen with SLP to address dynamic sitting balance with cog task, seen with PT second session for family training and functional transfer/mobility trials.   Vital Signs  Patient Currently in Pain: No   Orientation  Orientation  Overall Orientation Status: Impaired  Orientation Level: Oriented to person;Oriented to place; Disoriented to time;Disoriented to situation  Objective    ADL  Feeding: NPO  LE Dressing: Dependent/Total(despite increased time, still unable to doff shoes or thread/dethread pants; assist x2 in stance to manage up/down)  Toileting: Dependent/Total(incontinent, unable to manage pants up/down or stella-care due to inability to stand upright without BUE support on RW)        Balance  Sitting Balance: Contact guard assistance(EOM, chair, WC)  Standing Balance: Moderate assistance(modA x1 sit <> stand, [place] x1 with CGA-[place] x1 for RW management and mobility trials; extensive education on safety, placement of RW and WC before each transfer)  Standing Balance  Time: first session: x30 seconds; second session: x30 seconds, x1.5 minutes, x2 minutes, 2x30 seconds, x2 minutes  Activity: transfers, mobility trials with RW, ADL  Comment: Pt continues to need cues for safety, problem solving and mechanics in stance; posterior lean still present but better ability to self correct with less tactile cues this date; extensive education and demo provided to both patient and wife on placement of RW and WC during transfers, mobility trials, and management of RW. Functional Mobility  Functional - Mobility Device: Rolling Walker  Activity: Other; To/from bathroom  Assist Level: Dependent/Total  Functional Mobility Comments: modA x1 sit <> stand, [place] x1 with CGA-minAx1 for mobility trials, cues for RW management, BLE placement, and turning RW to sit in chair or WC.   Pt still demo's small ADAM, too large of steps without cues, and ataxic movements with turning needing consistent assistance and cues for safety, hand placement, and body mechanics  Bed mobility  Supine to Sit: Unable to assess  Sit to Supine: Unable to assess  Scooting: Unable to assess  Comment: Pt in R [address] upon arrival and recliner upon departure  Transfers  Stand Step Transfers: Moderate assistance;2 Person assistance;Minimal assistance  Sit to stand: Moderate assistance  Stand to sit: Moderate assistance  Transfer Comments: modA x1 for all sit <> stands, but still consistently needing [place] x2 for mobility with mod cues for ADAM and body mechanics during stand step transfers. Does demo posterior lean upon initial standing needing cues for repositioning before attempting stand step transfers-- PT providing tactile cues and assist with weight shifting and pelvic support while OT providing assistance with cues and education to wife/patient        Coordination  Gross Motor: Pt continues to demo ataxia with reaching back for chair and surfaces needing hand over hand to facilitate hand placement and mechanics. Pt fatigued easily needing rest breaks throughout  Fine Motor: fair coordination with manipulating cards during SLP activity              Cognition  Overall Cognitive Status: Exceptions  Arousal/Alertness: Delayed responses to stimuli  Following Commands: Follows one step commands with repetition; Follows one step commands with increased time  Attention Span: Attends with cues to redirect  Memory: Decreased short term memory  Safety Judgement: Decreased awareness of need for safety  Problem Solving: Assistance required to identify errors made;Assistance required to implement solutions;Assistance required to generate solutions;Assistance required to correct errors made;Decreased awareness of errors  Insights: Decreased awareness of deficits  Initiation: Requires cues for some  Sequencing: Requires cues for some  Cognition Comment: baseline STM issues     Perception  Overall Perceptual Status: Impaired  Unilateral Attention: Cues to maintain midline in standing;Cues to maintain midline in sitting  Initiation: Hand over hand to initiate tasks  Motor Planning: Hand over hand to sequence tasks  Perseveration: Perseverates during

## 2020-01-29 NOTE — PROGRESS NOTES
Patient taken via bed to [address] [place] [address] and returned 1949 same. Results in, no new abnormal density. Improvement noted. Patient resting in bed with call light within reach. Patient NPO with J tube with Standard with Fiber running at 50 ml/hr, flush 30 ml every two hours. Spouse requested swabs in order to moisten patient's mouth. Nurse responded and completed moist swab of patient's mouth with instructions if felt patient need again please request and nurse or PCA will assist.  Patient has not been noted coughing or gagging up to this time.

## 2020-01-29 NOTE — PROGRESS NOTES
Physical Therapy  Facility/Department: [facility]  Daily Treatment Note  NAME: [place] Wu  : 1940  MRN: [id number]    Date of Service: 2020    Discharge Recommendations:  Subacute/Skilled Nursing Facility   PT Equipment Recommendations  Equipment Needed: No  Other: Pt has w/c, RW, shower chair, and stair lift    Assessment   Body structures, Functions, Activity limitations: Decreased functional mobility ; Decreased strength;Decreased endurance;Decreased coordination;Decreased posture;Decreased safe awareness;Decreased high-level IADLs;Decreased balance  Assessment: pt exhibited increased partiicpation this date, more agreeable to participating. min A for w/c mobility due to decreased environment awareness. mod-CGA for dyn stand balance due to poor ADAM/COG resulting in posterior LOB. tolerated SCIFIT with no complaints. Recommend SNF Upon d/c due to pt's variable levels of assist (can require 2 person assist at times, otherwise mod-max A of 1) decreased safety awareness/command following and burden of care. Treatment Diagnosis: Decreased functional mobility  Prognosis: Good  Decision Making: Medium Complexity  PT Education: Goals;PT Role;Plan of Care;Transfer Training;Energy Conservation;General Safety;Orientation;Equipment; Family Education;Gait Training;Functional Mobility Training  Patient Education: Pt verbalizes understanding, will require reinforcement. REQUIRES PT FOLLOW UP: Yes  Activity Tolerance  Activity Tolerance: Patient Tolerated treatment well     Patient Diagnosis(es): There were no encounter diagnoses. has a past medical history of Abnormal leg movement, Aspiration pneumonia ([address] Ataxia, BPH (benign prostatic hyperplasia), Cancer of spine ([address] Carotid stenosis, left, Duodenal ulcer, Encephalopathy, Hyperlipidemia, Hypothyroid, Leukocytosis, Osteopenia, Osteoporosis, Subclavian arterial stenosis ([address] Testosterone deficiency, and Thyroid disease.    has a past surgical history that includes Spine surgery (tumor removal); Cataract removal with implant (Left, 8/20/2015); Colonoscopy; Cataract removal with implant (Right, 9-); and gastrostomy (01/17/2019). Restrictions  Restrictions/Precautions  Restrictions/Precautions: General Precautions, Fall Risk  Position Activity Restriction  Other position/activity restrictions: Maintain HOB >30 degrees for feeding. Subjective   General  Chart Reviewed: Yes  Response To Previous Treatment: Patient reporting fatigue but able to participate. Family / Caregiver Present: Yes  Referring Practitioner: Dr. Gabbie Benavides: report no pain,appears to be in more pleasant/agreeable mood this am  General Comment  Comments: found seated in w/c. Pain Screening  Patient Currently in Pain: No  Vital Signs  Patient Currently in Pain: No       Orientation  Orientation  Overall Orientation Status: Within Normal Limits  Cognition      Objective      Transfers  Sit to Stand: Moderate Assistance  Stand to sit: Moderate Assistance  Comment: sit to stand from w/c to RW x 2 reps with mod A, consistnet cues for safe hand placement/sequencing as well as assist for eccentric control. mod A to scoot back in chair. Wheelchair Activities  Left Brakes Level of Assistance: Contact guard assistance  Right Brakes Level of Assistance: Contact guard assistance  Propulsion 1  Propulsion: Manual  Level: Level Tile  Method: RUE;LUE  Level of Assistance: Minimal assistance  Description/ Details: min A to complete L turn, decreased velocity, cues to maintain linear path as pt deviates to L   Distance: 42 ft      Balance  Standing - Static: Fair;-  Standing - Dynamic: Poor(poor (+) to fair (-))  Comments: pt completed dyn stand activity x 2 sets : beanbag toss x 12 beanbags, reaching out of ADAM anteriorly/superiorly to promote upright posture. assist for pt ranges from mod A to brief period of CGA.  requires cues from therapist to reposition feet to obtain stable ADAM and COG as well as extend hips/trunk. with fatigue, pt demo's posterior and R lateral lean. seated rest break between sets. Other exercises  Other exercises 1: pt completed 2 min, 1'15\", 2 min on SCIFIT level 1 with BLEs only for increased strengthening and endurance. maintains a rate of 35-40 steps per min. Second Session:  Co-treat with OT for increased safety while progressing gait/ transfers. Stand pivot from low mat to w/c with min A-mod A. Sit to stand from wc to RW and chair to RW with mod A.   Gait x 15 ft x 2 reps with min A x 2 with cues for smaller steps to improve balance/stability, cues for upright posture as well as max VC for safety when completing turn to sit in chair. Sit to stand from w/c to RW with mod A, gait x 10 ft with min-mod A of 2 to ambulate to commode with cues for safety/sequencing. PT provided min-mod A for static balance while OT assisted with clothing management. Pt found to be incontinent of stool, while completing pericare pt was then incontinent of urine and required assist changing clean pants. Gait x 12 ft from commode to recliner with RW and mod A of 2 due to increased fatigue, cues for safety/sequencing and to decrease step legnths for increased stability. Goals  Short term goals  Time Frame for Short term goals: 1 week, 7 days, by 1/22/20  Short term goal 1: Pt will perform supine<>sit with CGA.- GOAL NOT MET. rueqires min A  Short term goal 2: Pt will perform sit<>stand with LRAD and Min A x1.- GOAL NOT MET. rqeuies mod A - max A  Short term goal 3: Pt will ambulate 50 feet with LRAD and Mod A x1.- GOAL NOT MET. limtied by distance and up to 2 person krista t  Short term goal 4: Pt will participate in 12-15 reps of BLE ther ex to increase strength and balance. - GOAL NOT MET. does not tolerate 12-15 reps  Long term goals  Time Frame for Long term goals : 3 weeks, 21 days, 2/5/20  Long term goal 1: Pt will perform supine<>sit with SBA.   Long

## 2020-01-29 NOTE — PROGRESS NOTES
Stopped tube feeding @ 0533 for 30 minutes before administering Synthroid. Present rate 55 ml/hr, will increase to 60 ml/hr when restarted after medication. Patient tolerating well.

## 2020-01-29 NOTE — PROGRESS NOTES
[place]  1/29/2020  [id number]    Chief Complaint: Critical illness myopathy    Subjective:   Resting in bed; no issues overnight. ROS: No n/v, cp, sob, f/c  Objective:  Patient Vitals for the past 24 hrs:   BP Temp Temp src Pulse Resp SpO2   01/28/20 1945 134/69 96.8 °F (36 °C) Oral 91 20 95 %   01/28/20 0850 123/72 97 °F (36.1 °C) Oral 93 18 95 %     Gen: No distress, pleasant. HEENT: Normocephalic, atraumatic. CV: Regular rate and rhythm. Resp: No respiratory distress. Lungs clear  Abd: Soft, nontender   Ext: No edema. Skin: small erythematous areas over upper back  Neuro: Alert, diffusely weak, cooperative  Wt Readings from Last 3 Encounters:   01/28/20 125 lb 3.5 oz (56.8 kg)   11/01/19 132 lb (59.9 kg)   01/07/19 133 lb 1 oz (60.4 kg)       Laboratory data:   Lab Results   Component Value Date    WBC 9.4 01/27/2020    HGB 12.6 (L) 01/27/2020    HCT 38.6 (L) 01/27/2020    MCV 94.5 01/27/2020     01/27/2020       Lab Results   Component Value Date     01/27/2020    K 4.4 01/27/2020     01/27/2020    CO2 30 01/27/2020    BUN 30 01/27/2020    CREATININE 0.8 01/27/2020    GLUCOSE 106 01/27/2020    CALCIUM 9.3 01/27/2020        Therapy progress:  PT  Position Activity Restriction  Other position/activity restrictions: Maintain HOB >30 degrees for feeding. Objective     Sit to Stand:  Moderate Assistance  Stand to sit: Moderate Assistance  Bed to Chair: 2 Person Assistance(mod A x 2 progressing to mod A x1, min A x1 at times with RW)  Device: Rolling Walker  Other Apparatus: Wheelchair follow  Assistance: Dependent/Total(min-mod A of 2)  Distance: 15 ft, 20 ft   OT  PT Equipment Recommendations  Equipment Needed: No  Other: Pt has w/c, RW, shower chair, and stair lift  Toilet - Technique: Stand pivot  Equipment Used: Standard toilet  Toilet Transfers Comments: mod cues for hand placement and body mechanics  Assessment        SLP  Current Diet : NPO     Diet Solids

## 2020-01-29 NOTE — PLAN OF CARE
Problem: Falls - Risk of:  Goal: Will remain free from falls  Description  Will remain free from falls  Outcome: Ongoing  Goal: Absence of physical injury  Description  Absence of physical injury  Outcome: Ongoing     Note:   Fall precautions in place, bed alarm on, nonskid foot wear applied, bed in lowest position, and call light within reach. Will continue to monitor.

## 2020-01-30 LAB
ANION GAP SERPL CALCULATED.3IONS-SCNC: 11 MMOL/L (ref 3–16)
BASOPHILS ABSOLUTE: 0.1 K/UL (ref 0–0.2)
BASOPHILS RELATIVE PERCENT: 1.8 %
BUN BLDV-MCNC: 34 MG/DL (ref 7–20)
CALCIUM SERPL-MCNC: 9.2 MG/DL (ref 8.3–10.6)
CHLORIDE BLD-SCNC: 101 MMOL/L (ref 99–110)
CO2: 31 MMOL/L (ref 21–32)
CREAT SERPL-MCNC: 1 MG/DL (ref 0.8–1.3)
CULTURE, RESPIRATORY: ABNORMAL
EOSINOPHILS ABSOLUTE: 0.3 K/UL (ref 0–0.6)
EOSINOPHILS RELATIVE PERCENT: 3.9 %
GFR AFRICAN AMERICAN: >60
GFR NON-AFRICAN AMERICAN: >60
GLUCOSE BLD-MCNC: 97 MG/DL (ref 70–99)
GRAM STAIN RESULT: ABNORMAL
HCT VFR BLD CALC: 40.4 % (ref 40.5–52.5)
HEMOGLOBIN: 13.1 G/DL (ref 13.5–17.5)
LYMPHOCYTES ABSOLUTE: 1 K/UL (ref 1–5.1)
LYMPHOCYTES RELATIVE PERCENT: 12.1 %
MCH RBC QN AUTO: 31 PG (ref 26–34)
MCHC RBC AUTO-ENTMCNC: 32.5 G/DL (ref 31–36)
MCV RBC AUTO: 95.3 FL (ref 80–100)
MONOCYTES ABSOLUTE: 0.7 K/UL (ref 0–1.3)
MONOCYTES RELATIVE PERCENT: 8.8 %
NEUTROPHILS ABSOLUTE: 5.9 K/UL (ref 1.7–7.7)
NEUTROPHILS RELATIVE PERCENT: 73.4 %
ORGANISM: ABNORMAL
ORGANISM: ABNORMAL
PDW BLD-RTO: 17.4 % (ref 12.4–15.4)
PLATELET # BLD: 267 K/UL (ref 135–450)
PLATELET SLIDE REVIEW: ADEQUATE
PMV BLD AUTO: 11.2 FL (ref 5–10.5)
POTASSIUM REFLEX MAGNESIUM: 4.5 MMOL/L (ref 3.5–5.1)
RBC # BLD: 4.24 M/UL (ref 4.2–5.9)
SLIDE REVIEW: ABNORMAL
SODIUM BLD-SCNC: 143 MMOL/L (ref 136–145)
WBC # BLD: 8 K/UL (ref 4–11)

## 2020-01-30 PROCEDURE — 1280000000 HC REHAB R&B

## 2020-01-30 PROCEDURE — 6370000000 HC RX 637 (ALT 250 FOR IP): Performed by: INTERNAL MEDICINE

## 2020-01-30 PROCEDURE — 36415 COLL VENOUS BLD VENIPUNCTURE: CPT

## 2020-01-30 PROCEDURE — 92526 ORAL FUNCTION THERAPY: CPT

## 2020-01-30 PROCEDURE — 97530 THERAPEUTIC ACTIVITIES: CPT

## 2020-01-30 PROCEDURE — 80048 BASIC METABOLIC PNL TOTAL CA: CPT

## 2020-01-30 PROCEDURE — 6370000000 HC RX 637 (ALT 250 FOR IP): Performed by: PHYSICAL MEDICINE & REHABILITATION

## 2020-01-30 PROCEDURE — 97112 NEUROMUSCULAR REEDUCATION: CPT

## 2020-01-30 PROCEDURE — 97129 THER IVNTJ 1ST 15 MIN: CPT

## 2020-01-30 PROCEDURE — 97130 THER IVNTJ EA ADDL 15 MIN: CPT

## 2020-01-30 PROCEDURE — 97535 SELF CARE MNGMENT TRAINING: CPT

## 2020-01-30 PROCEDURE — 97116 GAIT TRAINING THERAPY: CPT

## 2020-01-30 PROCEDURE — 85025 COMPLETE CBC W/AUTO DIFF WBC: CPT

## 2020-01-30 PROCEDURE — 97542 WHEELCHAIR MNGMENT TRAINING: CPT

## 2020-01-30 RX ORDER — FINASTERIDE 5 MG/1
5 TABLET, FILM COATED ORAL DAILY
Qty: 30 TABLET | Refills: 3 | Status: SHIPPED | OUTPATIENT
Start: 2020-01-31

## 2020-01-30 RX ORDER — LORAZEPAM 0.5 MG/1
0.5 TABLET ORAL EVERY 4 HOURS PRN
Qty: 10 TABLET | Refills: 0 | Status: SHIPPED | OUTPATIENT
Start: 2020-01-30 | End: 2020-02-29

## 2020-01-30 RX ORDER — LANSOPRAZOLE
15 KIT 2 TIMES DAILY
Qty: 300 ML | Refills: 3 | Status: SHIPPED | OUTPATIENT
Start: 2020-01-30

## 2020-01-30 RX ORDER — ATORVASTATIN CALCIUM 10 MG/1
10 TABLET, FILM COATED ORAL NIGHTLY
Qty: 30 TABLET | Refills: 3 | Status: SHIPPED | OUTPATIENT
Start: 2020-01-30

## 2020-01-30 RX ORDER — LEVOFLOXACIN 500 MG/1
500 TABLET, FILM COATED ORAL DAILY
Qty: 5 TABLET | Refills: 0 | Status: SHIPPED | OUTPATIENT
Start: 2020-01-30 | End: 2020-02-04

## 2020-01-30 RX ORDER — ASCORBIC ACID 500 MG
500 TABLET ORAL DAILY
Qty: 30 TABLET | Refills: 3 | Status: SHIPPED | OUTPATIENT
Start: 2020-01-31

## 2020-01-30 RX ORDER — DOXAZOSIN MESYLATE 1 MG/1
1 TABLET ORAL DAILY
Qty: 30 TABLET | Refills: 3 | Status: SHIPPED | OUTPATIENT
Start: 2020-01-31

## 2020-01-30 RX ORDER — LEVOTHYROXINE SODIUM 0.1 MG/1
100 TABLET ORAL DAILY
Qty: 30 TABLET | Refills: 3 | Status: SHIPPED | OUTPATIENT
Start: 2020-01-31

## 2020-01-30 RX ADMIN — ATORVASTATIN CALCIUM 10 MG: 10 TABLET, FILM COATED ORAL at 20:47

## 2020-01-30 RX ADMIN — LEVOTHYROXINE SODIUM 100 MCG: 100 TABLET ORAL at 09:55

## 2020-01-30 RX ADMIN — OXYCODONE HYDROCHLORIDE AND ACETAMINOPHEN 500 MG: 500 TABLET ORAL at 09:51

## 2020-01-30 RX ADMIN — LANSOPRAZOLE 15 MG: KIT at 20:47

## 2020-01-30 RX ADMIN — LANSOPRAZOLE 15 MG: KIT at 09:52

## 2020-01-30 RX ADMIN — PEDIATRIC MULTIPLE VITAMINS W/ IRON DROPS 10 MG/ML 5 ML: 10 SOLUTION at 09:51

## 2020-01-30 RX ADMIN — DOXAZOSIN 1 MG: 2 TABLET ORAL at 09:51

## 2020-01-30 RX ADMIN — VITAMIN D, TAB 1000IU (100/BT) 1000 UNITS: 25 TAB at 09:51

## 2020-01-30 RX ADMIN — LEVOFLOXACIN 500 MG: 500 TABLET, FILM COATED ORAL at 15:15

## 2020-01-30 RX ADMIN — FINASTERIDE 5 MG: 5 TABLET, FILM COATED ORAL at 09:51

## 2020-01-30 ASSESSMENT — PAIN SCALES - GENERAL: PAINLEVEL_OUTOF10: 0

## 2020-01-30 NOTE — CARE COORDINATION
CASE MANAGEMENT DISCHARGE SUMMARY      Discharge to: Home with spouse    New Durable Medical Equipment ordered/agency: None, inquired about sit to stand lift with [employer] [phone]. Transportation:    Family/car: Family to provide transportation to home via private vehicle.  time: 1/31/2020 @ 3-4pm    Notified:    Family: Rosy Pool, spouse [phone]   Agency: Referral made to [facility] [phone]   RN: Nursing staff notified of discharge planning for RN follow up. Note: Discharging nurse to complete BRET, reconcile AVS, and place final copy with patient's discharge packet. RN to ensure that written prescriptions for  Level II medications are sent with patient as per protocol.     LIAN Christensen

## 2020-01-30 NOTE — PROGRESS NOTES
Physical Therapy  Facility/Department: [facility]  Daily Treatment Note  NAME: Steven Poe  : 1940  MRN: [id number]    Date of Service: 2020    Discharge Recommendations:  Subacute/Skilled Nursing Facility   PT Equipment Recommendations  Equipment Needed: No  Other: Pt has w/c, RW, shower chair, and stair lift    Assessment   Body structures, Functions, Activity limitations: Decreased functional mobility ; Decreased strength;Decreased endurance;Decreased coordination;Decreased posture;Decreased safe awareness;Decreased high-level IADLs;Decreased balance  Assessment: pt with decreased motivation to participate in individual session this am. pt choosing head nods/shakes to respond to therapist questions. limtied command following durign all funcitonal trnasfers/dyn stand activity. poor dyn stand balance demonstrated with multiple posterior LOB. mod-max a for functional transfers with RW. Wife present for pm session. Wife with little to no recollection of previous educated provided during previous transfer training and required step by step directions to complete transfers safely. Therapist providing CGA at all times and up to mod A to assist pt and wife with multiple stand pivot transfers, as well as provide verbal cues for sequencing/safety. Therapist continues to recommend SNF upon dc due to safety concerns, however wife insistent pt return home. Recommend wife have assist from aide  to assist with transfers/mobility in home as well as HHPT. Treatment Diagnosis: Decreased functional mobility  Prognosis: Good  Decision Making: Medium Complexity  PT Education: Goals;PT Role;Plan of Care;Transfer Training;Energy Conservation;General Safety;Orientation;Equipment; Family Education;Gait Training;Functional Mobility Training  Patient Education: Pt verbalizes understanding, will require reinforcement.   REQUIRES PT FOLLOW UP: Yes  Activity Tolerance  Activity Tolerance: Patient Tolerated treatment well Patient Goals   Patient goals : To go home. Plan    Plan  Times per week: 5 out of 7 days  Times per day: Daily  Plan weeks: Tentatively 3 weeks, until 2/5  Current Treatment Recommendations: Strengthening, ROM, Balance Training, Functional Mobility Training, Transfer Training, ADL/Self-care Training, Cognitive/Perceptual Training, Endurance Training, Stair training, Gait Training, Wheelchair Mobility Training, Neuromuscular Re-education, Cognitive Reorientation, Patient/Caregiver Education & Training, Safety Education & Training, Home Exercise Program, Equipment Evaluation, Education, & procurement, Positioning  Safety Devices  Type of devices:  All fall risk precautions in place, Gait belt, Patient at risk for falls, Call light within reach, Chair alarm in place, Left in chair  Restraints  Initially in place: No     Therapy Time   Individual Concurrent Group Co-treatment   Time In 1000         Time Out 1030         Minutes 30         Timed Code Treatment Minutes: 30 Minutes     Second Session Therapy Time:   Individual Concurrent Group Co-treatment   Time In 1440     1400   Time Out 1455     1440   Minutes 15     40     Timed Code Treatment Minutes:  55    Total Treatment Minutes:  85    Sarai Devi, PT

## 2020-01-30 NOTE — PROGRESS NOTES
cues.    Did not target. -Recall for orientation information (place, month, and year): 3/3 independently (self-corrected x1)  -Immediate recall for a basic schedule: 3/4 independently  -Recall for a basic schedule after ~3 min delay: 2/4 independently   Goal 5: Pt will utilize compensatory speech strategies with min cues and 90% speech intelligibility. Did not target. Re-educated pt on strategies to improve speech intelligibility, which pt was able to demonstrate given min-mod cues. Pt was ~90% intelligible at the single word level, and ~80% intelligible at the sentence level; intelligibility increased to nearly 100% given min-mod cues. Other areas targeted: N/A -Divergent naming: able to name 5 items from a concrete category within 1 min, and 1 item from an abstract category within 1 min. Education:   Educated re: Recall strategies, speech strategies. Provided education to pt re: rationale for tasks and POC/goals. Safety Devices: [] Call light within reach  [] Chair alarm activated  [] Bed alarm activated  [x] Other: Transferred to other tx.    [] Call light within reach  [] Chair alarm activated  [] Bed alarm activated  [x] Other: Transferred to pt's room by OT. Assessment: Session 1: Patient seen in room, wife present. Pt very frustrated and initially resistant to participating in therapy this am. With encouragement, pt participated in oral care, and several repetitions of swallowing exercises. Discussed plan to complete repeat MBSS tomorrow with wife/pt. Both in agreement. Continue per POC. Session 2: Pt participated in cognitive-linguistic tx during co-tx with OT. Pt became increasingly fatigued as the session progressed, requiring increased cueing to elicit responses. Continue speech therapy POC. Plan: Continue as per plan of care.       Additional Information:     Barriers toward progress: Cognitive deficit, moderate-severe oropharyngeal dysphagia  Discharge recommendations:  [] Home independently  [] Home with assistance [x]  24 hour supervision  [] ECF [] Other: See PT/OT  Continued Tx Upon Discharge: ? [x] Yes [] No [] TBD based on progress while on ARU [] Vital Stim indicated [] Other:   Estimated discharge date: 02/01/2020    Interventions used this date:  [] Speech/Language Treatment  [] Instruction in HEP [] Group [x] Dysphagia Treatment [x] Cognitive Treatment   [] Other:       Total Time Breakdown / Charges    Time in Time out Total Time / units   Cognitive Tx 0800  0900 0830  0930 30 min/ 1 unit  30 min/ 2 unit   Speech Tx      Dysphagia Tx 1330 1400 30 min/ 1 unit       Electronically Signed by    Session 1:  Lino Smart M.A., CCC-SLP [id number]  Speech-Language Pathologist    Session 2:    Divya Huynh M.S. [id number] [facility]   Speech-Language Pathologist

## 2020-01-30 NOTE — PROGRESS NOTES
Nohemi Razo  1/30/2020  [id number]    Chief Complaint: Critical illness myopathy    Subjective:   Resting in bed; appreciate infectious disease input. ROS: No n/v, cp, sob, f/c  Objective:  Patient Vitals for the past 24 hrs:   BP Temp Temp src Pulse Resp SpO2   01/29/20 1930 129/78 97.5 °F (36.4 °C) Oral 100 18 96 %   01/29/20 0830 (!) 126/92 97.9 °F (36.6 °C) Oral 97 20 97 %     Gen: No distress, pleasant. HEENT: Normocephalic, atraumatic. CV: Regular rate and rhythm. Resp: No respiratory distress. Lungs clear  Abd: Soft, nontender   Ext: No edema. Skin: small erythematous areas over upper back  Neuro: Alert, diffusely weak, cooperative  Wt Readings from Last 3 Encounters:   01/28/20 125 lb 3.5 oz (56.8 kg)   11/01/19 132 lb (59.9 kg)   01/07/19 133 lb 1 oz (60.4 kg)       Laboratory data:   Lab Results   Component Value Date    WBC 9.4 01/27/2020    HGB 12.6 (L) 01/27/2020    HCT 38.6 (L) 01/27/2020    MCV 94.5 01/27/2020     01/27/2020       Lab Results   Component Value Date     01/27/2020    K 4.4 01/27/2020     01/27/2020    CO2 30 01/27/2020    BUN 30 01/27/2020    CREATININE 0.8 01/27/2020    GLUCOSE 106 01/27/2020    CALCIUM 9.3 01/27/2020        Therapy progress:  PT  Position Activity Restriction  Other position/activity restrictions: Maintain HOB >30 degrees for feeding. Objective     Sit to Stand:  Moderate Assistance  Stand to sit: Moderate Assistance  Bed to Chair: 2 Person Assistance(mod A x 2 progressing to mod A x1, min A x1 at times with RW)  Device: Rolling Walker  Other Apparatus: Wheelchair follow  Assistance: Dependent/Total(min-mod A of 2)  Distance: 15 ft, 20 ft   OT  PT Equipment Recommendations  Equipment Needed: No  Other: Pt has w/c, RW, shower chair, and stair lift  Toilet - Technique: Stand pivot  Equipment Used: Standard toilet  Toilet Transfers Comments: mod cues for hand placement and body mechanics  Assessment        SLP  Current Diet : NPO

## 2020-01-30 NOTE — PROGRESS NOTES
Occupational Therapy  Facility/Department: [facility]  Daily Treatment Note  NAME: Kerwin Morris  : 1940  MRN: [id number]    Date of Service: 2020    Discharge Recommendations:  24 hour supervision or assist, Home with Home health OT  OT Equipment Recommendations  Other: Per wife report has BSC at home, will need equipment for PT    Assessment   Performance deficits / Impairments: Decreased functional mobility ; Decreased safe awareness;Decreased ADL status; Decreased endurance;Decreased posture;Decreased coordination;Decreased balance  Assessment: Pt continues to demo poor endurance and safety awareness during sit <> Stand transfers in // bars and with RW. Pt continues to need hand over hand for placement and mechanics sit <> stand as well as pelvic alignment and posture in stance for upright balance; limited by fatigue, ataxia and cognition. Pt tolerated 4 bouts of standing ~1-2 minutes each to participate in recall activity with SLP but needing 2-3 min rest break between stands due to fatigue. Continues to fluctuate regarding safety, body mechanics and need for encouragement to continue to stand vs. sit. Cont to rec 24 hour physical assistance at home, cont OT POC. Second session: Upon arrival to room, pt with call bell going off requesting to urgently use bathroom. Sit <> stand from recliner to RW with mod hand over hand and modA x2, pt ambulating very quickly and unsafely to bathroom, poor balance and progression of BLE needing assist to maintain RW and maneuver in bathroom. Pt remains x2 A for toileting and toilet transfer with very poor descent onto [facility] despite assistance and cues. Wife present for family training for car transfers. Pt and wife performed 4 bouts of stand step WC <> car, each time wife with poor insight and sequencing of sit <> stand, movement of RW and cues to give patient for safe progression of BLE.   Pt and wife extensively educated and OT demo'd safe hand placement, mechanics, tactile cues, and use of AE for mobility. Educated wife to have x1A with car transfers and where to place WC. Pt and wife do not show carryover within sessions on safety awareness strategies. Cont to rec 24 hour physical assistance at home. Cont OT POC. Prognosis: Fair  OT Education: OT Role;Plan of Care;ADL Adaptive Strategies;Transfer Training;Orientation; Energy Conservation  Patient Education: transfer training with wife, standing tolerance/balance strategies, pts deficits vs. progress, DC recs, hand placement during transfers, ADL training; energy conservation   Barriers to Learning: memory, motivation, wife's insight to deficits and POC  REQUIRES OT FOLLOW UP: Yes  Activity Tolerance  Activity Tolerance: Treatment limited secondary to decreased cognition;Patient Tolerated treatment well;Patient limited by fatigue  Activity Tolerance: Pt with better tolerance to sitting and standing tasks but still continues to demo fatigue and poor safety/sequencing needing cues for body mechanics during functional mobility tasks. Safety Devices  Safety Devices in place: Yes  Type of devices: Nurse notified;Gait belt;Patient at risk for falls;Call light within reach; Left in chair;Chair alarm in place  Restraints  Initially in place: No         Patient Diagnosis(es): There were no encounter diagnoses. has a past medical history of Abnormal leg movement, Aspiration pneumonia ([address] Ataxia, BPH (benign prostatic hyperplasia), Cancer of spine ([address] Carotid stenosis, left, Duodenal ulcer, Encephalopathy, Hyperlipidemia, Hypothyroid, Leukocytosis, Osteopenia, Osteoporosis, Subclavian arterial stenosis ([address] Testosterone deficiency, and Thyroid disease. has a past surgical history that includes Spine surgery (tumor removal); Cataract removal with implant (Left, 8/20/2015); Colonoscopy;  Cataract removal with implant (Right, 9-); and gastrostomy Level: Moderate assistance  Functional Mobility Comments: modA x1 with [place] x1 to manage RW WC to recliner; posterior lean but pt did initiate movement of RW prior to sitting in recliner  Bed mobility  Supine to Sit: Unable to assess  Sit to Supine: Unable to assess  Scooting: Unable to assess  Transfers  Stand Step Transfers: Minimal assistance; Moderate assistance  Sit to stand: Minimal assistance; Moderate assistance  Stand to sit: Moderate assistance;Minimal assistance  Transfer Comments: [place] x1 sit <>  // bars, modA x1 with mod-max tactile/verbal cues for placement of hands and mechanics sit <> stand from [facility] to RW        Coordination  Gross Motor: Pt continues to demo ataxia with reaching back for chair and surfaces needing hand over hand to facilitate hand placement and mechanics. Fair GM with reaching to // bars prior to sitting and standing but limited safety/carryover in stance for coordination/balanace. Pt fatigued easily needing rest breaks throughout              Cognition  Overall Cognitive Status: Exceptions  Arousal/Alertness: Delayed responses to stimuli  Following Commands: Follows one step commands with repetition; Follows one step commands with increased time  Attention Span: Attends with cues to redirect  Memory: Decreased short term memory  Safety Judgement: Decreased awareness of need for safety  Problem Solving: Assistance required to identify errors made;Assistance required to implement solutions;Assistance required to generate solutions;Assistance required to correct errors made;Decreased awareness of errors  Insights: Decreased awareness of deficits  Initiation: Requires cues for some  Sequencing: Requires cues for some  Cognition Comment: baseline STM issues, eyes closed towards end of session due to fatigue     Perception  Overall Perceptual Status: Impaired  Unilateral Attention: Cues to maintain midline in standing;Cues to maintain midline in sitting  Initiation: Hand over hand to

## 2020-01-31 ENCOUNTER — APPOINTMENT (OUTPATIENT)
Dept: GENERAL RADIOLOGY | Age: 80
DRG: 091 | End: 2020-01-31
Attending: PHYSICAL MEDICINE & REHABILITATION
Payer: MEDICARE

## 2020-01-31 VITALS
SYSTOLIC BLOOD PRESSURE: 120 MMHG | HEART RATE: 97 BPM | WEIGHT: 125.22 LBS | BODY MASS INDEX: 20.86 KG/M2 | DIASTOLIC BLOOD PRESSURE: 81 MMHG | HEIGHT: 65 IN | OXYGEN SATURATION: 95 % | TEMPERATURE: 97.6 F | RESPIRATION RATE: 18 BRPM

## 2020-01-31 PROCEDURE — 99231 SBSQ HOSP IP/OBS SF/LOW 25: CPT | Performed by: INTERNAL MEDICINE

## 2020-01-31 PROCEDURE — 97530 THERAPEUTIC ACTIVITIES: CPT

## 2020-01-31 PROCEDURE — 97112 NEUROMUSCULAR REEDUCATION: CPT

## 2020-01-31 PROCEDURE — 6370000000 HC RX 637 (ALT 250 FOR IP): Performed by: INTERNAL MEDICINE

## 2020-01-31 PROCEDURE — 92611 MOTION FLUOROSCOPY/SWALLOW: CPT

## 2020-01-31 PROCEDURE — 92526 ORAL FUNCTION THERAPY: CPT

## 2020-01-31 PROCEDURE — 6370000000 HC RX 637 (ALT 250 FOR IP): Performed by: PHYSICAL MEDICINE & REHABILITATION

## 2020-01-31 PROCEDURE — 97535 SELF CARE MNGMENT TRAINING: CPT

## 2020-01-31 PROCEDURE — 74230 X-RAY XM SWLNG FUNCJ C+: CPT

## 2020-01-31 PROCEDURE — 97116 GAIT TRAINING THERAPY: CPT

## 2020-01-31 PROCEDURE — 97542 WHEELCHAIR MNGMENT TRAINING: CPT

## 2020-01-31 RX ADMIN — PEDIATRIC MULTIPLE VITAMINS W/ IRON DROPS 10 MG/ML 5 ML: 10 SOLUTION at 08:25

## 2020-01-31 RX ADMIN — LEVOTHYROXINE SODIUM 100 MCG: 100 TABLET ORAL at 06:08

## 2020-01-31 RX ADMIN — OXYCODONE HYDROCHLORIDE AND ACETAMINOPHEN 500 MG: 500 TABLET ORAL at 08:24

## 2020-01-31 RX ADMIN — DOXAZOSIN 1 MG: 2 TABLET ORAL at 08:24

## 2020-01-31 RX ADMIN — LANSOPRAZOLE 15 MG: KIT at 08:24

## 2020-01-31 RX ADMIN — FINASTERIDE 5 MG: 5 TABLET, FILM COATED ORAL at 08:25

## 2020-01-31 RX ADMIN — LEVOFLOXACIN 500 MG: 500 TABLET, FILM COATED ORAL at 14:54

## 2020-01-31 RX ADMIN — VITAMIN D, TAB 1000IU (100/BT) 1000 UNITS: 25 TAB at 08:25

## 2020-01-31 ASSESSMENT — PAIN SCALES - GENERAL: PAINLEVEL_OUTOF10: 0

## 2020-01-31 NOTE — PROCEDURES
Elevation: All  Reduced Anterior Laryngeal Movement: All  Reduced Thyrohyoid Approximation: All  Reduced Airway/laryngeal Closure: All  Reduced Tongue Base Retraction: All  Deep Penetration During: Puree; Thin teaspoon;Nectar teaspoon  Deep Penetration After: Puree;Nectar teaspoon; Thin teaspoon(With repeat swallows in an attempt to clear pharyngeal residue)  No Retrieval (deep): Nectar teaspoon;Puree; Thin teaspoon  Aspiration During: Nectar teaspoon; Thin teaspoon  Moderate Aspiration: Nectar teaspoon; Thin teaspoon  No Cough Reflex: Nectar teaspoon; Thin teaspoon;Puree  Weak Cough Reflex: Nectar teaspoon;Puree; Thin teaspoon(When cued by SLP)  No Bolus Expelled: Nectar teaspoon;Puree; Thin teaspoon  Pharyngeal Residue - Valleculae: All(Increased with puree)  Pharyngeal Residue - Pyriform: All(Increased with puree )  Pharyngeal Residue - Posterior Pharynx: All  Pharyngeal Wall - Weakness: All    Esophageal Phase  Impaired  Upper Esophageal Screen- Major Contributing Deficits  Reduced Cricopharyngeal Opening: All  Upper Esophageal Screen: Kyphosis also noted       Patient Position: Lateral and Patient Degrees: [facility] seated [facility]    Consistencies Administered: Dysphagia Pureed (Dysphagia I); Thin teaspoon;Nectar  teaspoon    Dysphagia Outcome Severity Scale: Level 1: Severe dysphagia- NPO. Unable to tolerate any PO safely  Penetration-Aspiration Scale (PAS): 8 - Material Enters the airway, passes below the vocal folds, and no effort is made to eject    Safe Swallow Protocol:  NPO  Oral care 2-3x/day with simultaneous suction       Behavior/Cognition/Vision/Hearing:  Behavior/Cognition: Alert; Cooperative; Requires cueing  Vision: Impaired  Vision Exceptions: Wears glasses at all times  Hearing: Exceptions to [facility]  Hearing Exceptions: Bilateral hearing aid    Recommendations/Treatment  Requires SLP Intervention: Yes  D/C Recommendations: (Continued dysphagia tx recommended)    Recommended Exercises:    Therapeutic

## 2020-01-31 NOTE — PROGRESS NOTES
RN notified  [] Repositioned  [] Intervention offered and patient declined  [x] N/A  [] Other: [] RN notified  [] Repositioned  [] Intervention offered and patient declined  [] N/A  [] Other:    Subjective     Pt seen upright in recliner initially, wife at bedside. Pt alert and cooperative throughout session / completion of MBSS. Objective:  Goals      Short-term Goals  Timeframe for Short-term Goals: 10 days (by 1/23/20)    Dysphagia goals:  Goal 1: Pt will complete pharyngeal strengthening ex's with min cues. MBSS completed. SLP provided pt and pt's wife with handout for laryngeal / pharyngeal strengthening and TBR exercises to continue to complete upon discharge. SLP encouraged pt to complete each exercise 10 times, 2-3x/day as able. GOAL PARTIALLY MET  Pt able to complete repetitions of reviewed exercises given mod-max cues. He requires increased processing time and repetition. Pt also continues to require intermittent suctioning of secretions. Goal 2: Pt will participate in v-stim tx for improved airway protection and pharyngeal strengthening. Did not target; MBSS completed this date    GOAL PARTIALLY MET  V-stim tx used throughout dysphagia tx sessions in conjunction with targeting various laryngeal / pharyngeal strengthening and TBR exercises (as able). Goal 3: The patient will tolerate instrumental swallowing procedure. MBSS completed this date -- see separate MBSS report for full findings. GOAL MET; MBSS COMPLETED 1/31/20. SEE SEPARATE MBSS NOTE FOR FULL FINDINGS. Goal 4: The patient will tolerate honey-thick liquids without signs and symptoms of aspiration 10/10 via cup. Did not target -- MBSS completed this date. See separate MBSS report for full findings. GOAL NOT MET  Continued strict NPO with meds / nutrition via G-tube is recommended s/p completion of MBSS. See separate MBSS note for full findings. Goal 5: The patient will tolerate puree foods 10/10.     Did alarm activated  [x] Other: Pt's belongings within reach, pt's wife also present. [] Call light within reach  [] Chair alarm activated  [] Bed alarm activated  [] Other:        Assessment: Instrumental swallow assessment via MBSS completed this date with continued recommendation for strict NPO with G-tube for meds / nutrition d/t pt's high aspiration risk with any PO intake. Pt was seen by ST for both dysphagia and cognitive-linguistic tx during ARU stay and was unable to meet the majority of his goals. However, pt's overall participation and performance appeared to be negatively impacted by pt's reduced RAFIQ, reduced insight into deficits, and frustration with tasks at times. He continues to require occasional suctioning of secretions at times. Pt would benefit from continued ST after discharge for both dysphagia and cognitive-linguistic tx. Plan: Pt discharged home after therapy this date, 1/31/20. Additional Information:     Barriers toward progress: Cognitive deficit, moderate-severe oropharyngeal dysphagia  Discharge recommendations:  [] Home independently  [] Home with assistance [x]  24 hour supervision  [] ECF [] Other: See PT/OT  Continued Tx Upon Discharge: ? [x] Yes [] No [] TBD based on progress while on ARU [] Vital Stim indicated [] Other:   Estimated discharge date: 1/31/20. Interventions used this date:  [] Speech/Language Treatment  [] Instruction in HEP [] Group [x] Dysphagia Treatment [] Cognitive Treatment   [] Other:       Total Time Breakdown / Charges    Time in Time out Total Time / units   Cognitive Tx      Speech Tx      Dysphagia Tx 0830 0930 60 min / 2 units (MBSS procedure and dysphagia tx)       Electronically Signed by  Tee Pederson M.S. [id number] [facility]  Speech-language pathologist  [id number]

## 2020-01-31 NOTE — PROGRESS NOTES
bathing with setup- predict wife will assist with all tasks however at home)  LE Bathing: (Pt refused, but is x2A for standing so will be x2A for bathing)  UE Dressing: Moderate assistance;Verbal cueing; Increased time to complete(assist with donning shirt and buttoning, able to thread BUE through shirt)  LE Dressing: Dependent/Total(pt was able to doff socks and shoes, assist with donning socks/shoes with strong L sided lean; modA to stand to manage pants/brief down but x2A to manage up)  Toileting: Dependent/Total(modA for standing balance for patient to manage down, stella-care seated but x2A for standing to manage pants x1 with x1A for balance)        Balance  Sitting Balance: Contact guard assistance  Standing Balance: Moderate assistance(in stance with RW and grab bar)  Standing Balance  Time: x30 seconds, 2x1-2 minutes, x3 minutes, 2x45 seconds, 2x30 seconds, 2x30 seconds, x1 minutes  Activity: transfers, ADLs, functional mobility trials, car transfer  Comment: continues to need cues for hand palcement, mechanics, and upright posture. Continues to demo poor posterior lean in stance with impulsivity is noted  Functional Mobility  Functional - Mobility Device: Rolling Walker(grab bars)  Activity: To/from bathroom; To/From therapy gym  Assist Level: Moderate assistance  Functional Mobility Comments: modA x1 with [place] x1 sit <> stand and then to manage RW and strong posterior lean but pt did initiate movement of RW prior to sitting in recliner  Toilet Transfers  Toilet - Technique: Stand pivot  Equipment Used: Standard toilet  Toilet Transfer: Minimal assistance  Toilet Transfers Comments: [place] and min cues for hand placement and body mechanics  Bed mobility  Supine to Sit: Stand by assistance  Sit to Supine: Stand by assistance  Scooting: Contact guard assistance  Transfers  Stand Step Transfers: Minimal assistance; Moderate assistance  Sit to stand: Minimal assistance; Moderate assistance  Stand to sit: Moderate

## 2020-01-31 NOTE — PROGRESS NOTES
Physical Therapy    Physical Therapy  Discharge Summary    Name:Reji Lopez  XRZ:[id number]  :1940  Treatment Diagnosis: Decreased functional mobility       Restrictions/Precautions  Restrictions/Precautions: General Precautions, Fall Risk           Position Activity Restriction  Other position/activity restrictions: Maintain HOB >30 degrees for feeding. Goals:                  Short term goals  Time Frame for Short term goals: 1 week, 7 days, by 20  Short term goal 1: Pt will perform supine<>sit with CGA.- GOAL NOT MET. rueqires min A  Short term goal 2: Pt will perform sit<>stand with LRAD and Min A x1.- GOAL NOT MET. rqeuies mod A - max A  Short term goal 3: Pt will ambulate 50 feet with LRAD and Mod A x1.- GOAL NOT MET. limtied by distance and up to 2 person krista t  Short term goal 4: Pt will participate in 12-15 reps of BLE ther ex to increase strength and balance. - GOAL NOT MET. does not tolerate 12-15 reps            Long term goals  Time Frame for Long term goals : 3 weeks, 21 days, 20  Long term goal 1: Pt will perform supine<>sit with SBA.- GOAL MET. compeltes with SBA  Long term goal 2: Pt will perform sit<>stand with LRAD and SBA.- GOAL NOT MET. rqeuires mod-min A  Long term goal 3: Pt will ambulate 75 feet with LRAD and CGA.- GOAL NOT MET. requires mod-max A  Long term goal 4: Pt will propel the w/c 500 feet with supervision including 2 turns to the right and left. - GOAL NOT MET. requires min A for distnaces > 50 ft     Pt. Met 0/4 short term goals and 1/4 long term goals. Discharge PT IRF:    CARE Score: 4                                   Pt. Currently ambulates 10 feet with RW and max A  Pt does not navigate steps  Sit to/from stand with mod A  Bed mobility with SBA  Recommend HHPT in order to decrease burden of care and increase tolerance/strength  Pt. Safe to return home with assistance from family/aide .  Pt wife participated extensively in family training for safety with transfers. Inconsistent carry over of education, both pt and wife require cues from therapist for safety with mobility as well as CGA at all time and up to mod A from therapist. Therapist recommended SNF upon d/c for increased safety, however wife insistent upon pt returning home. Pt would benefit from standing lift for increased safety with mobility in home.       Electronically signed by Rylee Lawton PT on 1/31/2020 at 11:42 AM

## 2020-01-31 NOTE — PROGRESS NOTES
Physical Therapy  Facility/Department: [facility]  Daily Treatment Note  NAME: Cassi Dawkins  : 1940  MRN: [id number]    Date of Service: 2020    Discharge Recommendations:  Subacute/Skilled Nursing Facility   PT Equipment Recommendations  Equipment Needed: standing lift  Other: Pt has w/c, RW, shower chair, and stair lift    Assessment   Body structures, Functions, Activity limitations: Decreased functional mobility ; Decreased strength;Decreased endurance;Decreased coordination;Decreased posture;Decreased safe awareness;Decreased high-level IADLs;Decreased balance  Assessment: co-treat with OT to maximize functional gains while decreasing effects of fatigue and for increased safety to progress gait. pt set to d/c this date following therapy. Therapist recommending SNF due to pt and wife conitnued need for cues and at times physical assist from therapist for increased safety with trnasfers/mobility. wife insistent pt return home, recommend pt have 24/ physical assist and [facility] aide/HHPT. pt would beneift from stand lift upon d/c for increased safety with transfers (can require up to 2 person assist with RW) as well as for position changes to prevent pressure sores as pt will spend majority of time in w/c or bed. with therapist providing assist, pt completes bed mobility wtih SBA, transfers with mod A, gait with max A. Wife able to assist pt with functional trnasfers, however requires VC from therapist as well as CGA at a minimum and up to mod  A at times to complete transfers safely. Treatment Diagnosis: Decreased functional mobility  Prognosis: Good  Decision Making: Medium Complexity  PT Education: Goals;PT Role;Plan of Care;Transfer Training;Energy Conservation;General Safety;Orientation;Equipment; Family Education;Gait Training;Functional Mobility Training  Patient Education: Pt verbalizes understanding, will require reinforcement.   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 has a past medical history of Abnormal leg movement, Aspiration pneumonia ([address] Ataxia, BPH (benign prostatic hyperplasia), Cancer of spine ([address] Carotid stenosis, left, Duodenal ulcer, Encephalopathy, Hyperlipidemia, Hypothyroid, Leukocytosis, Osteopenia, Osteoporosis, Subclavian arterial stenosis ([address] Testosterone deficiency, and Thyroid disease. has a past surgical history that includes Spine surgery (tumor removal); Cataract removal with implant (Left, 8/20/2015); Colonoscopy; Cataract removal with implant (Right, 9-); and gastrostomy (01/17/2019). Restrictions  Restrictions/Precautions  Restrictions/Precautions: General Precautions, Fall Risk  Position Activity Restriction  Other position/activity restrictions: Maintain HOB >30 degrees for feeding. Subjective   General  Chart Reviewed: Yes  Response To Previous Treatment: Patient reporting fatigue but able to participate. Family / Caregiver Present: Yes  Referring Practitioner: Dr. Kavya Price: denies pain  General Comment  Comments: found seated in recliner, impulsive this date wiht poor insight   Pain Screening  Patient Currently in Pain: No  Vital Signs  Patient Currently in Pain: No       Orientation  Orientation  Overall Orientation Status: Within Functional Limits  Cognition      Objective   Bed mobility  Supine to Sit: Stand by assistance  Sit to Supine: Stand by assistance  Scooting: Contact guard assistance  Comment: with use of bed controls and increased time  Transfers  Sit to Stand: Moderate Assistance  Stand to sit: Moderate Assistance  Bed to Chair: Moderate assistance  Stand Pivot Transfers: Moderate Assistance  Car Transfer:  Moderate Assistance  Comment: stand pivot from EOB <> w/c with RW and mod A. stand pivot recliner <> /wc with RW and mod A. car transfer completed with mod A. pt requires cues for sequencing/safety. therapist provided CGA-min A while wife assisted wiht stand pivot from w/c <> EOB and car transfer x 1 rep each, wife continues to require cues for safety. Ambulation  Ambulation?: Yes  More Ambulation?: Yes  Ambulation 1  Surface: level tile  Device: Rolling Walker  Assistance: Maximum assistance  Quality of Gait: decreased step lengths to assist with posture/COG, decreased step height, lack of hip flexion during BLE progression, assist for RW management, ataxic movements  Gait Deviations: Decreased step height;Decreased head and trunk rotation  Distance: 75 ft   Ambulation 2  Device 2: Rolling Walker  Assistance 2: Maximum assistance  Quality of Gait 2: Pt ambulates with posterolateral lean toward L with cueing for midline posture and bringing hips forward. Pt ambulates with narrow ADAM, cued for wider step on LLE. Pt requires BHR support and w/c follow for safety. Gait Deviations: Slow Elisabeth;Decreased step height;Decreased step length  Distance: 12 ft   Stairs/Curb  Stairs?: No  Propulsion 1  Propulsion: Manual  Level: Level Tile  Method: RUE;LUE  Level of Assistance: Stand by assistance;Minimal assistance  Description/ Details: able to complete both L and R turns with CGA wiht increased time and cues for obstacle awareness, decreased velocity, cues to prevent L deviation from linear path. >50 ft pt requires min A due to fatigue  Distance: 50 ft, 150 ft      Balance  Standing - Dynamic: Poor  Comments: dyn stand activity: clohting management with 1 UE support and assist from therapist ranging from min A to max A x 30 sec x 2 reps with cues for upright posture/wide ADAM. dyn seated task x 5 min with reaching out of ADAM and flexing trunk anteriorly. Pt demo'd 1 LOB anterior with max a to prevent LOB and return trunk to midline. Goals  Short term goals  Time Frame for Short term goals: 1 week, 7 days, by 1/22/20  Short term goal 1: Pt will perform supine<>sit with CGA. - GOAL NOT MET. rueqires min A  Short term goal 2: Pt will perform sit<>stand with LRAD and Min A x1.- GOAL NOT MET. rqeuies mod A - max A  Short term goal 3: Pt will ambulate 50 feet with LRAD and Mod A x1.- GOAL NOT MET. limtied by distance and up to 2 person krista t  Short term goal 4: Pt will participate in 12-15 reps of BLE ther ex to increase strength and balance. - GOAL NOT MET. does not tolerate 12-15 reps  Long term goals  Time Frame for Long term goals : 3 weeks, 21 days, 2/5/20  Long term goal 1: Pt will perform supine<>sit with SBA.- GOAL MET. compeltes with SBA  Long term goal 2: Pt will perform sit<>stand with LRAD and SBA.- GOAL NOT MET. rqeuires mod-min A  Long term goal 3: Pt will ambulate 75 feet with LRAD and CGA.- GOAL NOT MET. requires mod-max A  Long term goal 4: Pt will propel the w/c 500 feet with supervision including 2 turns to the right and left. - GOAL NOT MET. requires min A for distnaces > 50 ft   Patient Goals   Patient goals : To go home. Plan    Plan  Times per week: 5 out of 7 days  Times per day: Daily  Plan weeks: Tentatively 3 weeks, until 2/5  Current Treatment Recommendations: Strengthening, ROM, Balance Training, Functional Mobility Training, Transfer Training, ADL/Self-care Training, Cognitive/Perceptual Training, Endurance Training, Stair training, Gait Training, Wheelchair Mobility Training, Neuromuscular Re-education, Cognitive Reorientation, Patient/Caregiver Education & Training, Safety Education & Training, Home Exercise Program, Equipment Evaluation, Education, & procurement, Positioning  Safety Devices  Type of devices:  All fall risk precautions in place, Gait belt, Patient at risk for falls, Call light within reach, Chair alarm in place, Left in chair  Restraints  Initially in place: No     Therapy Time   Individual Concurrent Group Co-treatment   Time In       1030   Time Out       1130   Minutes       60   Timed Code Treatment [facility]: [facility]

## 2020-01-31 NOTE — PROGRESS NOTES
Patient's G-tube flushed and medication passed easily @ 2027. Placement of J-Tube completed 2027 then flushed tried and tube clotted. Clot buster medication obtained and tried twice with an hour between. At present time J-tube has yellowish discharge in tube with blood coloration within at base of tube yet still will not flush. Will try again in half an hour in order to start tube feeding.

## 2020-01-31 NOTE — PROGRESS NOTES
Infectious Disease Follow up Notes    CC :  Tracheobronchitis, recurrent aspiration       Antibiotics:   levaquin 500 po daily     Admit Date:   1/14/2020  Hospital Day: 18    Subjective:   He remains afebrile on room air  Tolerating nocturnal TF at 65cc/hour. Not coughing much per wife and RN.     Patient says he feels ok     Objective:     Patient Vitals for the past 8 hrs:   BP Temp Temp src Pulse Resp SpO2   01/31/20 0821 120/81 97.6 °F (36.4 °C) Oral 97 18 95 %       EXAM:  General:  Alert, oriented, NAD    HEENT:  NCAT, PERRL, sclera anicteric  MM dry  NECK:   supple     LUNGS:  Non-labored breathing, +upper airway noise, no wheezing or rhonchi    CV:   RRR    ABD: Soft, +BS, NT     EXT:  No LE edema    SKIN: No focal rash     Wound:      LINE:         Scheduled Meds:   levofloxacin  500 mg Per J Tube Daily    vitamin C  500 mg Per G Tube Daily    atorvastatin  10 mg Per G Tube Nightly    Vitamin D  1,000 Units Per G Tube Daily    doxazosin  1 mg Per G Tube Daily    finasteride  5 mg Per G Tube Daily    levothyroxine  100 mcg Per G Tube Daily    pediatric multivitamin-iron  5 mL Per G Tube Daily    lansoprazole  15 mg Per G Tube BID         Data Review:    Lab Results   Component Value Date    WBC 8.0 01/30/2020    HGB 13.1 (L) 01/30/2020    HCT 40.4 (L) 01/30/2020    MCV 95.3 01/30/2020     01/30/2020     Lab Results   Component Value Date    CREATININE 1.0 01/30/2020    BUN 34 (H) 01/30/2020     01/30/2020    K 4.5 01/30/2020     01/30/2020    CO2 31 01/30/2020       Hepatic Function Panel:   Lab Results   Component Value Date    ALKPHOS 75 01/17/2020    ALT 26 01/17/2020    AST 27 01/17/2020    PROT 6.0 01/17/2020    BILITOT 0.4 01/17/2020    LABALBU 3.1 01/17/2020       Cultures:   1/16     BC x2 neg              UC neg   1/22     BC x2 neg   1/27     Sputum culture heavy growth Klebsiella aerogenes

## 2020-01-31 NOTE — PROGRESS NOTES
Nutrition Assessment (Enteral Nutrition)    Type and Reason for Visit: Reassess    Nutrition Recommendations:   1. Continue nocturnal feeds of TwoCal HN (fluid restricted formula) at 70 mL/hr x 10 hr (suggest 8p-6a). 2. Recommend 30 mL H20 q 2 hours or flushes per MD. Monitor Na trends and need to adjust flushes. Increase flush if Na increases greater than 145 mEq/L.  3. Ensure head of bed is 30 - 45 degrees during continuous or bolus gastric feeding. Turn off the feeding if head of bed is lowered less than 30 degrees.   4. Monitor TF intake and tolerance, blood sugar trends, fluid and electrolyte balances, diet advancement, nutrition adequacy, weights, BMs    Nutrition Assessment: Follow up: Pt stable from a nutritional standpoint AEB tolerating TF. Pt coughing improved now. Explained to wife that coughing was likely unrelated to TF. Wife reports that she has Jevity 1.5 cans at home and would like to use those for TF, will provide recommendations for TwoCal formula and Jevity 1.5. Malnutrition Assessment:  · Malnutrition Status: At risk for malnutrition  · Context: Acute illness or injury  · Findings of the 6 clinical characteristics of malnutrition (Minimum of 2 out of 6 clinical characteristics is required to make the diagnosis of moderate or severe Protein Calorie Malnutrition based on AND/ASPEN Guidelines):  1. Energy Intake-(Nutrition via TF),      2. Weight Loss-Unable to assess,    3. Fat Loss-No significant subcutaneous fat loss,    4. Muscle Loss-Mild muscle mass loss, Temples (temporalis muscle), Clavicles (pectoralis and deltoids)  5. Fluid Accumulation-No significant fluid accumulation,    6.   Strength-Not measured    Nutrition Risk Level: High    Nutrition Needs:  · Estimated Daily Total Kcal: 4933-5247(13-34 kcal/kg)  · Estimated Daily Protein (g): 57-69(1.0-1.2 g/kg)  · Estimated Daily Fluid (ml/day): 1 ml/kcal    Nutrition Diagnosis:   · Problem: Inadequate oral intake  · Etiology: related to Difficulty swallowing     Signs and symptoms:  as evidenced by Nutrition support - EN    Objective Information:  · Nutrition-Focused Physical Findings: BM x2 on 1/30  · Wound Type: None  · Current Nutrition Therapies:  · Oral Diet Orders: NPO   · Oral Diet intake: NPO  · Oral Nutrition Supplement (ONS) Orders: None  · ONS intake: NPO  · Tube Feeding (TF) Orders:   · Feeding Route: Jejunostomy  · Formula: Fluid Restricted(TwoCal HN)  · Duration: Cyclic  · Goal TF & Flush Orders Provides: Recommend nocturnal feed of TwoCal HN (fluid restricted formula) at goal rate of 70 ml/hr x 10 hr (suggest 8p-6a) to provide 700 ml TV, 1400 kcal, 58 gm protein, and 490 ml free fluid + 30 ml H20 flush q 2 hr or flushes per MD. If Jevity 1.5 desired, recommend nocturnal feeds of 4 cans of Jevity 1.5 total per day (recommend running TF at 70 ml/hr for about 14 hr) providing 948 ml TV, 1422 kcal, 60 gm protein, and 720 ml free fluid + 30 ml H20 q 2 hr.    · Additional Calories: From TF  · Anthropometric Measures:  · Ht: 5' 5\" (165.1 cm)   · Current Body Wt: 125 lb 3.5 oz (56.8 kg)  · Admission Body Wt: 125 lb 14.1 oz (57.1 kg)  · Weight Change: Pt endorses stable weight Hx - PADMINI d/t lack of weight Hx in EMR   · Ideal Body Wt: 136 lb (61.7 kg),  · BMI Classification: BMI 18.5 - 24.9 Normal Weight    Nutrition Interventions:   Continue current Tube Feeding  Continued Inpatient Monitoring    Nutrition Evaluation:   · Evaluation: Goal achieved   · Goals: Pt will tolerate TF at goal rate during ARU stay   · Monitoring: TF Intake, TF Tolerance, Nutrition Progression, Weight, Pertinent Labs      Electronically signed by Liborio Ugalde RD, LD on 1/31/20 at 1:14 PM    Contact Number: Office: [phone]; [address]: [id number]

## 2020-02-05 NOTE — DISCHARGE SUMMARY
Occupational Therapy  Discharge Summary     Name:Reji Dc No  PUJA:[id number]  :1940  Treatment Diagnosis: Decreased functional mobility       Restrictions/Precautions  Restrictions/Precautions: General Precautions, Fall Risk           Position Activity Restriction  Other position/activity restrictions: Maintain HOB >30 degrees for feeding. Goals:   Short term goals  Time Frame for Short term goals: 10 days (by 20)  Short term goal 1: Pt will complete functional transfers with Min A-GOAL MET   Short term goal 2: Pt will complete LE dressing with Mod A -GOAL PARTIALLY MET, MODA for donning/doffing shoes and socks  Short term goal 3: Pt will perform 2-3 minutes dynamic standing activity with Mod A-GOAL MET    Long term goals  Time Frame for Long term goals : 21 days (by 2/3/20)  Long term goal 1: Pt will complete functional transfers with SBA- NOT MET  Long term goal 2: Pt will complete LE dressing with Min A -NOT MET  Long term goal 3: Pt will perform toileting with Mod A -NOT MET    Pt. Met 2/3 short term goals and 0/3 long term goals. Patient has made inconsistent progress towards goals therefore 0/3 goals met. Pt has needed substantial cues with min-modA hand over hand for placement and mechanics during transfers. Continues to demo poor safety and mechanics during all transfers and wife continues to demo poor carryover with education despite multiple family training sessions. Current Functional Status:   ADL  Feeding: NPO(tube feed)  Grooming: Setup(wash face and hands)  UE Bathing: (Pt refused but sitting EOB pt is able to complete bathing with setup- predict wife will assist with all tasks however at home)  LE Bathing: (Pt refused, but is x2A for standing so will be x2A for bathing)  UE Dressing:  Moderate assistance, Verbal cueing, Increased time to complete(assist with donning shirt and buttoning, able to thread BUE through shirt)  LE Dressing: Dependent/Total(pt was able to
strength  · how to take this  · when to take this  Notes to patient:  Atorvastatin (Lipitor)  Use: lower cholesterol; prevent heart attack/stroke  Side effects: headache, muscle pain/weakness     doxazosin 1 MG tablet  Commonly known as:  CARDURA  1 tablet by Per G Tube route daily  What changed:  how to take this  Notes to patient:  Use: treatment of signs and symptoms of benign prostatic hyperplasia (BPH)  Adverse; dizziness, fatigue, headache, edema, drowsiness, vertigo, pain     finasteride 5 MG tablet  Commonly known as:  PROSCAR  1 tablet by Per G Tube route daily  What changed:  how to take this  Notes to patient:  Use: treatment of symptomatic BPH  Side effects: orthostatic hypotension, dizziness, weakness, sexual dysfunction     levothyroxine 100 MCG tablet  Commonly known as:  SYNTHROID  1 tablet by Per G Tube route Daily  What changed:    · how to take this  · when to take this  Notes to patient:  Use: to treat low thyroid levels  Side effects: hair loss, chest pain, irregular heartbeat, irritability, insomnia         CONTINUE taking these medications    azelastine HCl 0.15 % Soln  Notes to patient:  Use: Treatment of allergic rhinitis  Side effects: Fatigue, headache, rhinorrhea         STOP taking these medications    zoledronic acid 5 MG/100ML Soln  Commonly known as:  Reclast        ASK your doctor about these medications    levofloxacin 500 MG tablet  Commonly known as:  LEVAQUIN  1 tablet by Per J Tube route daily for 5 days  Ask about: Should I take this medication?            Where to Get Your Medications      These medications were sent to [facility], [address] [facility], [address] [place] [id number]    Phone:  [phone]   · ascorbic acid 500 MG tablet  · atorvastatin 10 MG tablet  · Cholecalciferol 400 UNIT Tabs tablet  · doxazosin 1 MG tablet  · finasteride 5 MG tablet  · lansoprazole 3 MG/ML

## 2020-04-14 NOTE — PROGRESS NOTES
Dr. Moshre-Please review.  It is writer's understanding antibody testing not currently available within [facility] and would encourage patient to check back in another 3-4 weeks.    Thank you!  FABIO CopelandN, RN     Repositioned  [] Intervention offered and patient declined  [x] N/A  [] Other: [] RN notified  [] Repositioned  [] Intervention offered and patient declined  [] N/A  [] Other:   Subjective     Pt seen for co-treatment with occupational therapy in therapy gym. Pt awake and alert, and participated with encouragement. Pt awake, evidently fatigued/lethargic; seen in room, seated up in chair. Wife present. Objective:  Goals     Short-term Goals  Timeframe for Short-term Goals: 10 days (by 1/23/20)    Dysphagia goals:  Goal 1: Pt will complete pharyngeal strengthening ex's with min cues. Did not target   Targeted via clinician directed repetitions of effortful swallow (x6) with cues, and abbey (pt struggled to perform despite max cues). Utilized minimal amount (e.g. coated spoon) of moderately thick water to facilitate swallow initiation. Pt noted to have ongoing cough throughout session. Goal 2: Pt will participate in v-stim tx for improved airway protection and pharyngeal strengthening. Did not target   Did not target. Goal 3: The patient will tolerate instrumental swallowing procedure. Will complete instrumental swallow study in the future as clinically indicated. Will complete instrumental swallow study in the future as clinically indicated. Goal 4: The patient will tolerate honey-thick liquids without signs and symptoms of aspiration 10/10 via cup. Did not target Did not target. Goal 5: The patient will tolerate puree foods 10/10. Did not target. Did not target. Short-term Goals  Timeframe for Short-term Goals: 10 days (by 1/23/20)    Cognitive-Linguistic goals:  Goal 1: Pt will participate in further assessment of cognitive-linguistic skills. Ongoing. Targeted via picture sequencing task; pt required max decreasing to mod cues for accuracy. Targeted via med-management task; pt required mod cues for recall of information. Ongoing.     Patient required max cues for orientation x3   Goal 2: Pt will increase safety awareness within current environment with min cues. Not directly targeted. Not directly targeted. Goal 3: Pt will demonstrate insight into deficits with min cues. Not directly targeted. Not directly targeted. Goal 4: Pt will recall new info per 10 min delay with min cues. Not directly targeted. Not directly targeted. Goal 5: Pt will utilize compensatory speech strategies with min cues and 90% speech intelligibility. Not directly targeted; pt continues to present with moderately impaired speech clarity. Benefited from cues for repetition. Not directly targeted; pt continues to present with moderately impaired speech clarity. Benefited from cues for repetition/volume. Other areas targeted: N/a    Education:   Educated re: role of SLP, purpose of visit, recommendations. Educated re: role of SLP, purpose of visit, recommendations. Safety Devices: [x] Call light within reach  [] Chair alarm activated  [x] Bed alarm activated  [x] Other: Pt's wife present at bedside    [x] Call light within reach  [] Chair alarm activated  [] Bed alarm activated  [] Other:    Assessment: Patient participated well during co-treatment with occupational therapist, remaining fully alert and engaged in therapy tasks. During pm session, pt's fatigue was a barrier to participation, requiring multiple cues towards end of session to remain awake. Continues to present with moderately impaired speech intelligibility and severe cognitive deficits. Cont goals. Plan: Continue as per plan of care.       Additional Information:     Barriers toward progress: Cognitive deficit, moderate-severe oropharyngeal dysphagia  Discharge recommendations:  [] Home independently  [] Home with assistance [x]  24 hour supervision  [] ECF [] Other: See PT/OT  Continued Tx Upon Discharge: ? [x] Yes [] No [] TBD based on progress while on ARU [] Vital Stim indicated [] Other:   Estimated discharge date: 02/01/2020    Interventions used this date:  [] Speech/Language Treatment  [] Instruction in HEP [] Group [x] Dysphagia Treatment [x] Cognitive Treatment   [] Other:       Total Time Breakdown / Charges    Time in Time out Total Time / units   Cognitive Tx 1030  1400 1100  1415 30 min/ 2 units  15 min/ 1 unit    Speech Tx -- -- --   Dysphagia Tx 1330 1400 30 min/ 1 unit       Electronically Signed by  Giovanny Clayton M.A., Vincent Walter   Speech-Language Pathologist

## 2020-05-28 NOTE — ED NOTES
Pt sitting up in bed pillow provided. Wife at bedside. Pt remains on cardiac monitor and oxygen. Will continue to monitor.       [employer], RN  05/28/20 [id number]

## 2020-05-28 NOTE — ED NOTES
Pt resting in bed with eyes closed. Pt respirations even and unlabored. Pt wife at bedside. Cardiac monitor and oxygen remains in place. IV patent and infusing. Will continue to monitor.       [employer], RN  05/28/20 [id number]

## 2020-05-28 NOTE — ED PROVIDER NOTES
Emergency Physician Note    Chief Complaint  Aspiration (pt arrives from home with complaints of aspirating. EMs reports family was feeding pt through feeding tube and pt vomiting and family was unable to sit pt up all the way. Pt also confused per ems )       History of Present Illness  Jennifer Avila is a 68 y.o. male who presents to the ED for vomiting. Patient had currently has a J-tube due to swallowing difficulties and multiple episodes of aspiration pneumonia. Wife reports that she is noticed an increase spitting of bile from his abdomen over the past couple of days and then today after giving of some tube feeding he was lying down and it appeared that he vomited and they were unable to sit him up in time and they are concerned about aspiration pneumonia initially it was reported by EMS that the patient appeared to be more confused than usual however the wife reports that the patient has not had any increase in confusion. Patient is a poor historian however he is able to answer questions yes or no. He denies any complaint at this time. In particular he does deny chest pain and abdominal pain. Denies fever, chills, malaise, chest pain, shortness of breath, cough, abdominal pain, nausea,  diarrhea, headache, sore throat, dysuria, back pain, rash. No palliative/provocative factors. Unless otherwise stated in this report or unable to obtain because of the patient's clinical or mental status as evidenced by the medical record, this patient's positive and negative responses for review of systems, constitutional, psych, eyes, ENT, cardiovascular, respiratory, gastrointestinal, neurological, genitourinary, musculoskeletal, integument systems and systems related to the presenting problem are either stated in the preceding paragraph or were not pertinent or were negative for the symptoms and/or complaints related to the medical problem.     I have reviewed the following from the nursing Sodium 131 (L) 136 - 145 mmol/L    Potassium reflex Magnesium 3.7 3.5 - 5.1 mmol/L    Chloride 91 (L) 99 - 110 mmol/L    CO2 29 21 - 32 mmol/L    Anion Gap 11 3 - 16    Glucose 110 (H) 70 - 99 mg/dL    BUN 29 (H) 7 - 20 mg/dL    CREATININE 0.9 0.8 - 1.3 mg/dL    GFR Non-African American >60 >60    GFR African American >60 >60    Calcium 9.3 8.3 - 10.6 mg/dL    Total Protein 7.2 6.4 - 8.2 g/dL    Alb 3.7 3.4 - 5.0 g/dL    Albumin/Globulin Ratio 1.1 1.1 - 2.2    Total Bilirubin 0.4 0.0 - 1.0 mg/dL    Alkaline Phosphatase 96 40 - 129 U/L    ALT 16 10 - 40 U/L    AST 20 15 - 37 U/L    Globulin 3.5 g/dL   Urinalysis, reflex to microscopic   Result Value Ref Range    Color, UA Yellow Straw/Yellow    Clarity, UA Clear Clear    Glucose, Ur Negative Negative mg/dL    Bilirubin Urine Negative Negative    Ketones, Urine Negative Negative mg/dL    Specific Gravity, UA 1.020 1.005 - 1.030    Blood, Urine Negative Negative    pH, UA 5.5 5.0 - 8.0    Protein, UA 30 (A) Negative mg/dL    Urobilinogen, Urine 0.2 <2.0 E.U./dL    Nitrite, Urine Negative Negative    Leukocyte Esterase, Urine Negative Negative    Microscopic Examination YES     Urine Type NotGiven    Lactate, Sepsis   Result Value Ref Range    Lactic Acid, Sepsis 2.5 (H) 0.4 - 1.9 mmol/L   Lactate, Sepsis   Result Value Ref Range    Lactic Acid, Sepsis 1.4 0.4 - 1.9 mmol/L   Procalcitonin   Result Value Ref Range    Procalcitonin 0.11 0.00 - 0.15 ng/mL   Lactate Dehydrogenase   Result Value Ref Range     100 - 190 U/L   APTT   Result Value Ref Range    aPTT 25.6 24.2 - 36.2 sec   Fibrinogen   Result Value Ref Range    Fibrinogen 642 (H) 200 - 397 mg/dL   Protime-INR   Result Value Ref Range    Protime 11.4 10.0 - 13.2 sec    INR 0.98 0.86 - 1.14   Microscopic Urinalysis   Result Value Ref Range    Hyaline Casts, UA 3-5 (A) 0 - 2 /LPF    Mucus, UA 1+ (A) None Seen /LPF    WBC, UA 0-2 0 - 5 /HPF    RBC, UA 0-2 0 - 4 /HPF    Epithelial Cells, UA 2-5 0 - 5 /HPF 36   Ht 5' 5\" (1.651 m)   Wt 125 lb (56.7 kg)   SpO2 92%   BMI 20.80 kg/m² ,   Cardiovascular exam shows regular rate and rhythm with normal S1/S2 without murmurs, rubs or gallops. Pulmonary exam shows a normal respiratory effort, clear to auscultation, bilaterally without Rales/Wheezes/Rhonchi. Skin exam shows normal color/perfusion. Extremity exam shows brisk capillary refill. Peripheral pulses were 2+ in the lower extremities. - Patient seen and evaluated in room 1    I discussed the results of the CAT scan with the wife as well as his chest x-ray and a she understands that he will be treated for both sepsis and the abnormal findings on his CT exam.  Wife requested the patient be transferred to [facility] as that is where he gets all of his care. If also reports that he tend to have lower blood pressure with a systolic around 728. He does continue to have a soft blood pressure here but he is maintaining it above 100 and therefore I do not feel that he needs pressors at this time. Spoke with the neurosurgeon on-call at [facility] made them aware of the findings. He agrees with the assessment and plan. I spoke with Florina Turner, ICU hospitalist at [facility]. We thoroughly discussed the history, physical exam, laboratory and imaging studies, as well as, current course of treatment within the emergency department. Based upon that discussion, we've decided to transfer Sammuel Apgar to [facility], for further observation and evaluation of Sammuel Apgar current condition. As I have deemed necessary from their history, physical, and studies, I have considered and evaluated Sammuel Apgar for the following diagnoses:      CLINICAL IMPRESSION:  1. Hypoxemia requiring supplemental oxygen    2. Abnormal head CT    3. Cerebral hemorrhage(nontraumatic) ([address]    4. Septicemia ([address]    5. Pneumonia due to organism    6.  Aspiration pneumonia due to inhalation of vomitus ([address]

## 2020-05-28 NOTE — ED NOTES
Report given to [employer]. All paperwork given to [employer].      Ba Amos RN  05/28/20 0700

## 2020-09-15 NOTE — ONCOLOGY
Fractionated SRS treatment note on the gamma knife machine. Francine Cortes  09/15/20       Patient presents for his 1sr of 5 treatments on the gamma knife machine for his growing posterior fossa mass. He was taken to the gamma knife machine, and his mask was fitted to the machine. Patient confirmation and identification was confirmed using 2 means and a timeout procedure was also done prior to the procedure.     Cone beam CT scan was performed. These images were reviewed in detailed and fused upon her stereotactic reference images. Isodose lines were also reviewed. These were then accepted by myself and our physicist.     Fraction Number: 1  Dose delivered: 6 Gy  Total dose delivered: 6Gy  Planned total dose:  30Gy     The patient acutely tolerated the procedure well. Mask was removed. He will follow-up when the next appointment has been scheduled. I was present throughout the procedure.     Karyle Music, MD

## 2020-09-15 NOTE — ONCOLOGY
Pt here today for ICON sim for growing mass in posterior fossa. Pt too frail for biopsy. Met with pt , his wife and son. Reviewed goals and side effects of ICON GKRS. 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 Questions answered. Full note in G2. Mask made today, CBCT obtained.

## 2020-09-15 NOTE — PROGRESS NOTES
Pt arrived for the first day of 5 fraction treatment. Pt was with his wife and son. Wife states that she did not give the decadron, keppra or protonix this am because she was worried about aspiration. She will be giving the meds when she gets home. Pt arrived in a wheel chair, minimally verbal, and minimal weight bearing.

## 2020-09-15 NOTE — OP NOTE
[facility]  PATIENT NAME:   Levar Hyman   MR #:  [id number]  YOB: 1940   ACCOUNT #:  [de-identified]  SURGEON:  Junior Rach M.D. ADMIT DATE:  9/15/2020  SERVICE:  Neurosurgery  DICTATED BY: Junior Rach M.D. SURGERY DATE:  9/15/2020           OPERATIVE REPORT       PREOPERATIVE DIAGNOSIS:     1. Right tentorial tumor     POSTOPERATIVE DIAGNOSIS:     1. Same    PROCEDURE(S) PERFORMED:      1. Five-fraction frameless Gamma Knife radiosurgery for right tentorial tumor     TREATMENT DATES:     9/15/2020 through 9/21/2020 (five weekday treatments)      NEUROSURGEON: Amie Mooney. Kelly Chavez MD                                     RADIATION ONCOLOGIST: Cecelia Britton MD     ANESTHESIA: None    COMPLICATIONS: None    INDICATIONS: This is a 70-year-old man with a remote history of malignant ependymoma S/P surgery, craniospinal radiation, and chemotherapy. The patient was recently admitted for aspiration pneumonia. MRI scan revealed a right tentorial tumor and the differential diagnosis included malignant meningioma, dural metastasis, or radiation-induced neoplasm. The patient was not a candidate for surgery, and therefore, we recommended five-fraction, frameless Gamma Knife radiosurgery. The patient was aware of the potential benefits in terms of tumor control and the possible risks including (but not limited to): brain swelling, hemorrhage, new neurological symptoms, radiation injury (necrosis) of the brain, and/or secondary tumor formation. PERFORMANCE STATUS: 80%    SYSTEMIC DISEASE: Not applicable    DETAILS OF PROCEDURE: The patient underwent a radiosurgery fusion MRI scan prior to the procedure. A thermoplastic mask was formed and the patient underwent a reference cone-beam CT scan in the Gamma Knife ICON. These images were sent over the network to the [address] [place] [address] and fused with the MRI scan.  An optimal treatment plan was generated and approved by the neurosurgeon, radiation oncologist, and medical physicist.       On the days of treatment, the patient was positioned in the mask and a set-up cone-beam CT scan was performed. The bony landmarks were compared with the reference cone-beam CT to confirm set-up accuracy. The patient then underwent stereotactic radiosurgery treatment delivery. Intra-fraction movement was monitored by infrared throughout the procedure. Target Size (cm) Shape Shots Parameters Dose (Gy) Isodose (%)   Right tentorial 5.05 Oval 27 6 Gy x 5 30 50     The right frontal target is complex based on maximal diameter > 3.5 cm. The patient tolerated the procedure without difficulty. The patient was instructed on medications and the need for follow-up in two weeks. SPECIMENS REMOVED: None    ESTIMATED BLOOD LOSS: None    TOTAL TIME SPENT WITH PATIENT: In conformance with CMS regulations, I affirm that I participated in target contouring and development of the treatment plan. I was present at fractions #1 and #5 for patient positioning, review of cone-beam CT to confirm set-up accuracy, and treatment delivery. Danny Duncan, and Hang Dubose (Radiation Oncology) and [facility]) were present for fractions #1-5.     Mayra Santana MD

## 2020-09-16 NOTE — PROGRESS NOTES
Pt arrived to Gamma knife at 11 am brought to [facility] suite at 11:25. The patient has a very week cough effort and is unable to clear his phlegm. This RN suctioned the patient after encouraging him to cough. He was placed on the [facility] table, time out completed and pt lifted to the table. The patient is minimally verbal and a complete lift. He is unable to stand or move his legs on his own. He moved out of position several times and stopped treatment once due to a painful mask at which time he denied the need for suctioning. At the end of treatment he denied any issues but communication is severely limited. The patient was transferred to his wheelchair and taken to his family in the waiting room. They were given a brief update and the pt was placed in their care.

## 2020-09-16 NOTE — ONCOLOGY
Fractionated SRS treatment note on the gamma knife machine. Zac López  09/16/20       Patient presents for his 2nd of 5 treatments on the gamma knife machine for his right tentorial tumor. He was taken to the gamma knife machine, and his mask was fitted to the machine. Patient confirmation and identification was confirmed using 2 means and a timeout procedure was also done prior to the procedure.     Cone beam CT scan was performed. These images were reviewed in detailed and fused upon her stereotactic reference images. Isodose lines were also reviewed. These were then accepted by myself and our physicist.     Fraction Number: 2  Dose delivered: 6 Gy  Total dose delivered: 12Gy  Planned total dose:  30Gy     The patient acutely tolerated the procedure well. Mask was removed. He will follow-up when the next appointment has been scheduled. I was present throughout the procedure.     Vannessa Lovelace MD

## 2020-09-18 NOTE — ONCOLOGY
Fractionated SRS treatment note on the gamma knife machine. Rae   09/18/20       Patient presents for his 4th of 5 treatments on the gamma knife machine for his presumed right tentorial tumor. He was taken to the gamma knife machine, and his mask was fitted to the machine. Patient confirmation and identification was confirmed using 2 means and a timeout procedure was also done prior to the procedure.     Cone beam CT scan was performed. These images were reviewed in detailed and fused upon her stereotactic reference images. Isodose lines were also reviewed. These were then accepted by myself and our physicist.     Fraction Number: 4  Dose delivered: 6 Gy  Total dose delivered: 24Gy  Planned total dose:  30Gy     The patient acutely tolerated the procedure well. Mask was removed. He will follow-up when the next appointment has been scheduled. I was present throughout the procedure.     Luis Frank MD

## 2020-09-21 NOTE — ONCOLOGY
Patient name: Annamarie Vazquez  Patient YOB: 1940  Patient MRN: [id number]  Patient Phone:        Procedure Note: ICON fractionated SRS R posterior fossa. Pt now s/p 5/5 , 3000/3000 cGy ICON R posterior fossa treatment today. Pt remains quite debilitated and nonverbal which has been his recent baseline. F/u per Dr. Samuel Ramos.     Notes    Sushil Nevarez MD    Electronically signed by Sushil Nevarez MD 09/21/2020 09:52 EDT

## 2020-10-07 NOTE — PROGRESS NOTES
2 week hand off   Spoke to the patients wife on Monday. She stated that the patient was up and watching tv. She felt that he seemed more alert than usual. She said a nurse is coming to visit to address his 3 inch long bed sore on his coccyx. She stated the pt finished the steroid taper last week and is doing ok. She denied any issues. I spent time talking to her about the decubitus. Reinforcing that the patient should not sit on it for long periods and that he should be positioned off of it regularly.

## 2021-01-01 ENCOUNTER — HOSPITAL ENCOUNTER (EMERGENCY)
Age: 81
End: 2021-03-24
Attending: EMERGENCY MEDICINE
Payer: COMMERCIAL

## 2021-01-01 DIAGNOSIS — I46.9 CARDIOPULMONARY ARREST (HCC): Primary | ICD-10-CM

## 2021-01-01 LAB
GLUCOSE BLD-MCNC: 67 MG/DL (ref 70–99)
PERFORMED ON: ABNORMAL

## 2021-01-01 PROCEDURE — 99291 CRITICAL CARE FIRST HOUR: CPT

## 2021-01-01 PROCEDURE — 92950 HEART/LUNG RESUSCITATION CPR: CPT

## 2021-01-01 PROCEDURE — 6360000002 HC RX W HCPCS: Performed by: EMERGENCY MEDICINE

## 2021-01-01 PROCEDURE — 2500000003 HC RX 250 WO HCPCS: Performed by: EMERGENCY MEDICINE

## 2021-01-01 RX ORDER — EPINEPHRINE 0.1 MG/ML
SYRINGE (ML) INJECTION DAILY PRN
Status: DISCONTINUED | OUTPATIENT
Start: 2021-01-01 | End: 2021-01-01 | Stop reason: HOSPADM

## 2021-01-01 RX ORDER — CALCIUM CHLORIDE 100 MG/ML
INJECTION INTRAVENOUS; INTRAVENTRICULAR DAILY PRN
Status: DISCONTINUED | OUTPATIENT
Start: 2021-01-01 | End: 2021-01-01 | Stop reason: HOSPADM

## 2021-01-01 RX ADMIN — EPINEPHRINE 1 MG: 0.1 INJECTION, SOLUTION ENDOTRACHEAL; INTRACARDIAC; INTRAVENOUS at 15:39

## 2021-01-01 RX ADMIN — EPINEPHRINE 1 MG: 0.1 INJECTION, SOLUTION ENDOTRACHEAL; INTRACARDIAC; INTRAVENOUS at 15:36

## 2021-01-01 RX ADMIN — CALCIUM CHLORIDE 1000 MG: 100 INJECTION, SOLUTION INTRAVENOUS at 15:37

## 2021-01-01 RX ADMIN — SODIUM BICARBONATE 50 MEQ: 84 INJECTION, SOLUTION INTRAVENOUS at 15:36

## 2021-03-24 NOTE — CODE DOCUMENTATION
Hospital clergy call patient's  Jake Knowles. He is currently on his way here to be with family and provide prayers.

## 2021-03-24 NOTE — ED PROVIDER NOTES
[address] [facility]  ED  EMERGENCYDEPARTMENT ENCOUNTER      Pt Name: Yamileth Centeno  MRN: [id number]  [place] 1940  Date of evaluation: 3/24/2021  Rachel Kwok MD    CHIEF COMPLAINT       Chief Complaint   Patient presents with    Cardiac Arrest     cpr in progress patient intubated by medics see note in code narrarator. HISTORY OF PRESENT ILLNESS   (Location/Symptom, Timing/Onset,Context/Setting, Quality, Duration, Modifying Factors, Severity)  Note limiting factors. Yamileth Centeno is a 68 y.o. male with history of hyperlipidemia, malignant neoplasm of the brain, hypothyroidism on hospice and palliative care who presents to the emergency department for cardiac arrest.   HPI    Nursing Notes were reviewed. REVIEW OF SYSTEMS    (2-9 systems for level 4, 10 or more for level 5)     Review of Systems   Unable to perform ROS: Acuity of condition       Except as noted above the remainder of the review of systems was reviewedand negative.        PAST MEDICAL HISTORY     Past Medical History:   Diagnosis Date    Abnormal leg movement     Aspiration pneumonia (HCC)     Ataxia     BPH (benign prostatic hyperplasia)     Cancer of spine ([facility] [address]     Carotid stenosis, left     Duodenal ulcer     Encephalopathy     Hyperlipidemia     Hypothyroid     Leukocytosis     Osteopenia     Osteoporosis     Subclavian arterial stenosis (HCC)     Testosterone deficiency     Thyroid disease          SURGICAL HISTORY       Past Surgical History:   Procedure Laterality Date    CATARACT REMOVAL WITH IMPLANT Left 8/20/2015    CATARACT REMOVAL WITH IMPLANT Right 9-    COLONOSCOPY      GASTROSTOMY  01/17/2019    Elenita Baca MD    SPINE SURGERY  tumor removal    X 2         CURRENT MEDICATIONS       Previous Medications    ALBUTEROL (PROVENTIL) (2.5 MG/3ML) 0.083% NEBULIZER SOLUTION    USE 3 ML VIA NEBULIZER EVERY 4 HOURS AS NEEDED FOR WHEEZING    ATORVASTATIN (LIPITOR) 10 MG TABLET    1 tablet by Per G Tube route nightly    AZELASTINE HCL 0.15 % SOLN    by Nasal route    DEXAMETHASONE (DECADRON) 4 MG/ML INJECTION    Infuse 4 mg intravenously every 12 hours    DOXAZOSIN (CARDURA) 1 MG TABLET    1 tablet by Per G Tube route daily    FINASTERIDE (PROSCAR) 5 MG TABLET    1 tablet by Per G Tube route daily    LANSOPRAZOLE 3 MG/ML SUSP    5 mLs by Per G Tube route 2 times daily    LEVETIRACETAM (KEPPRA) 500 MG TABLET    Take 500 mg by mouth 2 times daily    LEVOTHYROXINE (SYNTHROID) 100 MCG TABLET    1 tablet by Per G Tube route Daily    PEDIATRIC MULTIVITAMIN-IRON (POLY-VI-SOL WITH IRON) SOLUTION    5 mLs by Per G Tube route daily    VITAMIN C (VITAMIN C) 500 MG TABLET    1 tablet by Per G Tube route daily    VITAMIN D 400 UNIT TABS TABLET    2.5 tablets by Per G Tube route daily       ALLERGIES     Cinnamon, Other, Peanut oil, and Tetanus toxoid adsorbed    FAMILY HISTORY       Family History   Problem Relation Age of Onset    Stroke Mother     Diabetes Father           SOCIAL HISTORY       Social History     Socioeconomic History    Marital status:      Spouse name: Not on file    Number of children: Not on file    Years of education: Not on file    Highest education level: Not on file   Occupational History    Not on file   Social Needs    Financial resource strain: Not on file    Food insecurity     Worry: Not on file     Inability: Not on file    Transportation needs     Medical: Not on file     Non-medical: Not on file   Tobacco Use    Smoking status: Never Smoker    Smokeless tobacco: Never Used   Substance and Sexual Activity    Alcohol use: No    Drug use: No    Sexual activity: Not on file   Lifestyle    Physical activity     Days per week: Not on file     Minutes per session: Not on file    Stress: Not on file   Relationships    Social connections     Talks on phone: Not on file     Gets together: Not on file     Attends Confucianism service: Not on file Active member of club or organization: Not on file     Attends meetings of clubs or organizations: Not on file     Relationship status: Not on file    Intimate partner violence     Fear of current or ex partner: Not on file     Emotionally abused: Not on file     Physically abused: Not on file     Forced sexual activity: Not on file   Other Topics Concern    Not on file   Social History Narrative    ** Merged History Encounter **            SCREENINGS    Zarina Coma Scale  Eye Opening: None  Best Verbal Response: None  Best Motor Response: None  [place] Coma Scale Score: 3        PHYSICAL EXAM    (up to 7 for level 4, 8 ormore for level 5)     ED Triage Vitals   BP Temp Temp src Pulse Resp SpO2 Height Weight   -- -- -- -- -- -- -- --       Physical Exam  Constitutional:       Appearance: He is ill-appearing. HENT:      Mouth/Throat:      Mouth: Mucous membranes are dry. Eyes:      Comments: Fixed and dilated   Cardiovascular:      Pulses:           Carotid pulses are 0 on the right side and 0 on the left side. Femoral pulses are 0 on the right side and 0 on the left side. Musculoskeletal:      Right lower le+ Pitting Edema present. Left lower le+ Pitting Edema present. Neurological:      GCS: GCS eye subscore is 1. GCS verbal subscore is 1. GCS motor subscore is 1.          DIAGNOSTIC RESULTS     EKG: All EKG's are interpreted by the Emergency Department Physicianwho either signs or Co-signs this chart in the absence of a cardiologist.      RADIOLOGY:   Non-plain film images such as CT, Ultrasound and MRI are read by the radiologist. Plain radiographic images are visualized and preliminarily interpreted by the emergency physician with the below findings:      Interpretation per the Radiologist below, if available at the time of this note:    No orders to display         ED BEDSIDE ULTRASOUND:   Performed by ED Physician - none    LABS:  Labs Reviewed   POCT GLUCOSE - Abnormal; Notable for the following components:       Result Value    POC Glucose 67 (*)     All other components within normal limits    Narrative:     Performed at:  [facility]) - [facility]  [address] [place] [address],  [place], [facility] Lisa Walter   Phone [phone]       All other labs were within normal range ornot returned as of this dictation. EMERGENCY DEPARTMENT COURSE and DIFFERENTIAL DIAGNOSIS/MDM:   Vitals: There were no vitals filed for this visit. MDM    ED COURSE/MDM    -Katheryn Joshi is a 68 y.o. male with a history of malignant neoplasm of the brain, hyperlipidemia, hypothyroidism, on hospice and palliative care who presents ED as a cardiac arrest.     Per family patient was being treated for aspiration pneumonia and was on antibiotics, he had his nasal cannula on and receiving came in to check on him in the morning. He however complained of shortness of breath and then became unresponsive. They called 911 who instructed them to put patient on the ground and start CPR. PD/EMS arrived soon after and continued CPR. Per EMS they had given 4 doses of epi before obtaining sinus bradycardia, gave to push dose epi's as he was starting to become more more bradycardic and then eventually lost pulse again. He was given another dose of epi and continued to be asystole. Upon arrival to ED, they had been working on patient for greater than 1 hour. They also stated that they suctioned the patient several times as he had crackles on exam.  -Upon arrival, last dose epi have been given 4 minutes prior and was then given another dose of epi with bicarb and calcium. Patient had Mariluz Birch LT we continue to bag patient for airway. Glucose was checked and was found to be 67. During pulse check, it appeared to be PEA without any appreciable pulse. Second dose of epi was given, and on repeat pulse check, no pulse and there is no cardiac activity noted on ultrasound.   Given duration of ALS, and persistent asystole, do not feel that there would be much utility or recovery with continued intervention. Therefore further intervention was discontinued and time of death was called at 46  -Discussed ED course with family with  in the room. They had some questions regarding cause of death whether or not Covid may have contributed. I informed him that this is likely a cardiac arrest related to respiratory issues given the history and what they were telling me immediately prior to event. However I cannot say 1 way or another what caused the actual cardiac arrest and if Covid contributed to it. They asked if there would be an autopsy and I informed them that given his comorbidities and age not likely however that decision will be up to the . They had no further questions at this time.  -spoke with Dr. Darya Wang, his PCP who states that he would be able to sign the death report. REASSESSMENT      As noted above    CRITICAL CARE TIME   Total Critical Care time was 35 minutes, excluding separately reportableprocedures. There was a high probability of clinicallysignificant/life threatening deterioration in the patient's condition which required my urgent intervention. CONSULTS:  None    PROCEDURES:  Unless otherwise noted below, none     Procedures    FINAL IMPRESSION      1. Cardiopulmonary arrest [facility])          DISPOSITION/PLAN   DISPOSITION  2021 04:14:11 PM      PATIENT REFERREDTO:  No follow-up provider specified.     DISCHARGE MEDICATIONS:  New Prescriptions    No medications on file          (Please note that portions of this note were completed with a voice recognition program.  Efforts were made to edit the dictations but occasionally wordsare mis-transcribed.)    Ashli Duron MD (electronically signed)  Attending Emergency Physician            Ashli Duron MD  [phone]

## 2021-03-24 NOTE — ED NOTES
Bed: 03  Expected date:   Expected time:   Means of arrival:   Comments:  caterina Adams, RN  03/24/21 1539

## 2021-09-13 NOTE — FLOWSHEET NOTE
03/24/21 1947   Encounter Summary   Services provided to: Family   Referral/Consult From: Nurse   Support System Spouse; Children;Family members; Sabianism/bj community   Place of [address] Completed   Continue Visiting   (3/24 Grief spt to family. )   Complexity of Encounter High   Length of Encounter 1 hour;30 minutes   Crisis   Type Code   Intervention Provide update during procedure   Grief and Life Adjustment   Type Death   Assessment Calm; Approachable;Grieving;Coping; Shock   Intervention Active listening;Explored feelings, thoughts, concerns;Prayer;Sustaining presence/ Ministry of presence;Contacted support as requested per patient/family request;Mediation;Grief care; Discussed death;Discussed afterlife; Discussed relationship with God;Discussed belief system/Taoist practices/bj;Discussed illness/injury and it's impact; Complementary therapies    Who? Rev. Jose Luis Mcintyre   Why? Spt for family   At Request Of Pt's wife   Outcome Comfort;Expressed gratitude;Engaged in conversation; Shared life review;Expressed feelings/needs/concerns;Coping;Tearful; Shared reminiscences;Grieving;Receptive
2

## 2024-01-01 NOTE — CARE COORDINATION
Writer met with patient and spouse to discuss disposition of care conference with interdisciplinary team on  1/22/2020             Discussed:  See care conference note for further details. Recommendations: Home with 24/7 assistance. Home health referral with PT/OT/ST/SN/Aide. Anticipated discharge date:  2/4/2020    Patient and spouse verbalized understanding of recommendations and anticipated discharge date. Patient's spouse states they use [facility] and [facility].      [facility], [facility]  [facility]  [address] [place] [id number]  Phone: [phone]  Fax: [phone]  Follow Up Time: 2 weeks